# Patient Record
Sex: MALE | URBAN - METROPOLITAN AREA
[De-identification: names, ages, dates, MRNs, and addresses within clinical notes are randomized per-mention and may not be internally consistent; named-entity substitution may affect disease eponyms.]

---

## 2023-02-28 ENCOUNTER — APPOINTMENT (RX ONLY)
Dept: URBAN - METROPOLITAN AREA CLINIC 164 | Facility: CLINIC | Age: 63
Setting detail: DERMATOLOGY
End: 2023-02-28

## 2023-02-28 DIAGNOSIS — L72.8 OTHER FOLLICULAR CYSTS OF THE SKIN AND SUBCUTANEOUS TISSUE: ICD-10-CM

## 2023-02-28 DIAGNOSIS — R20.2 PARESTHESIA OF SKIN: ICD-10-CM

## 2023-02-28 DIAGNOSIS — D485 NEOPLASM OF UNCERTAIN BEHAVIOR OF SKIN: ICD-10-CM

## 2023-02-28 PROBLEM — D48.5 NEOPLASM OF UNCERTAIN BEHAVIOR OF SKIN: Status: ACTIVE | Noted: 2023-02-28

## 2023-02-28 PROCEDURE — ? RECOMMENDATIONS

## 2023-02-28 PROCEDURE — ? BIOPSY BY SHAVE METHOD

## 2023-02-28 PROCEDURE — ? CONSULTATION EXCISION

## 2023-02-28 PROCEDURE — 99203 OFFICE O/P NEW LOW 30 MIN: CPT | Mod: 25

## 2023-02-28 PROCEDURE — 11102 TANGNTL BX SKIN SINGLE LES: CPT

## 2023-02-28 PROCEDURE — ? COUNSELING

## 2023-02-28 ASSESSMENT — LOCATION DETAILED DESCRIPTION DERM
LOCATION DETAILED: RIGHT CENTRAL BUCCAL CHEEK
LOCATION DETAILED: RIGHT POSTERIOR SHOULDER
LOCATION DETAILED: LEFT MEDIAL TRAPEZIAL NECK

## 2023-02-28 ASSESSMENT — LOCATION ZONE DERM
LOCATION ZONE: NECK
LOCATION ZONE: ARM
LOCATION ZONE: FACE

## 2023-02-28 ASSESSMENT — LOCATION SIMPLE DESCRIPTION DERM
LOCATION SIMPLE: RIGHT CHEEK
LOCATION SIMPLE: RIGHT SHOULDER
LOCATION SIMPLE: POSTERIOR NECK

## 2023-02-28 NOTE — PROCEDURE: RECOMMENDATIONS
Recommendation Preamble: The following recommendations were made during the visit: CeraVe anti itch cream
Detail Level: Zone
Render Risk Assessment In Note?: no

## 2023-02-28 NOTE — PROCEDURE: CONSULTATION EXCISION
Detail Level: Detailed
Size Of Lesion: 1.5
X Size Of Lesion In Cm (Optional): 0
Patient/Caregiver provided printed discharge information.

## 2023-03-22 ENCOUNTER — APPOINTMENT (RX ONLY)
Dept: URBAN - METROPOLITAN AREA CLINIC 164 | Facility: CLINIC | Age: 63
Setting detail: DERMATOLOGY
End: 2023-03-22

## 2023-03-22 DIAGNOSIS — L72.8 OTHER FOLLICULAR CYSTS OF THE SKIN AND SUBCUTANEOUS TISSUE: ICD-10-CM

## 2023-03-22 PROCEDURE — 13131 CMPLX RPR F/C/C/M/N/AX/G/H/F: CPT

## 2023-03-22 PROCEDURE — ? PRESCRIPTION

## 2023-03-22 PROCEDURE — 11422 EXC H-F-NK-SP B9+MARG 1.1-2: CPT

## 2023-03-22 PROCEDURE — ? EXCISION

## 2023-03-22 RX ORDER — DOXYCYCLINE HYCLATE 100 MG/1
CAPSULE, GELATIN COATED ORAL BID
Qty: 14 | Refills: 0 | Status: ERX | COMMUNITY
Start: 2023-03-22

## 2023-03-22 RX ADMIN — DOXYCYCLINE HYCLATE: 100 CAPSULE, GELATIN COATED ORAL at 00:00

## 2023-03-22 ASSESSMENT — LOCATION SIMPLE DESCRIPTION DERM: LOCATION SIMPLE: POSTERIOR NECK

## 2023-03-22 ASSESSMENT — LOCATION DETAILED DESCRIPTION DERM: LOCATION DETAILED: LEFT MEDIAL TRAPEZIAL NECK

## 2023-03-22 ASSESSMENT — LOCATION ZONE DERM: LOCATION ZONE: NECK

## 2023-03-22 NOTE — PROCEDURE: EXCISION
Medical Necessity Information: It is in your best interest to select a reason for this procedure from the list below. All of these items fulfill various CMS LCD requirements except lesion extends to a margin.
Include Z78.9 (Other Specified Conditions Influencing Health Status) As An Associated Diagnosis?: No
Medical Necessity Clause: This procedure was medically necessary because the lesion that was treated was:
Lab: 6
Lab Facility: 3
Surgeon Performing Repair (Optional): Dr. Gonzáles
Biopsy Photograph Reviewed: Yes
Size Of Lesion In Cm: 1.8
X Size Of Lesion In Cm (Optional): 0
Size Of Margin In Cm: 0.1
Anesthesia Volume In Cc: 9
Eye Clamp Note Details: An eye clamp was used during the procedure.
Excision Method: Elliptical
Saucerization Depth: dermis and superficial adipose tissue
Repair Type: Complex
Intermediate / Complex Repair - Final Wound Length In Cm: 2.5
Suturegard Retention Suture: 2-0 Nylon
Retention Suture Bite Size: 3 mm
Length To Time In Minutes Device Was In Place: 10
Number Of Hemigard Strips Per Side: 1
Width Of Defect Perpendicular To Closure In Cm (Required): 1.5
Undermining Type: Entire Wound
Debridement Text: The wound edges were debrided prior to proceeding with the closure to facilitate wound healing.
Helical Rim Text: The closure involved the helical rim.
Vermilion Border Text: The closure involved the vermilion border.
Nostril Rim Text: The closure involved the nostril rim.
Retention Suture Text: Retention sutures were placed to support the closure and prevent dehiscence.
Suture Removal: 10 days
Epidermal Closure Graft Donor Site (Optional): simple interrupted
Graft Donor Site Bandage (Optional-Leave Blank If You Don't Want In Note): Steri-strips and a pressure bandage were applied to the donor site.
Detail Level: Simple
Excision Depth: adipose tissue
Scalpel Size: 15 blade
Anesthesia Type: 1% lidocaine with epinephrine
Hemostasis: Electrocautery
Estimated Blood Loss (Cc): minimal
Anesthesia Type: 1% lidocaine with 1:400,000 epinephrine
Deep Sutures: 4-0 Vicryl
Dermal Closure: buried vertical mattress
Epidermal Sutures: 3-0 Prolene
Epidermal Closure: running
Wound Care: Petrolatum
Dressing: dry sterile dressing
Suturegard Intro: Intraoperative tissue expansion was performed, utilizing the SUTUREGARD device, in order to reduce wound tension.
Suturegard Body: The suture ends were repeatedly re-tightened and re-clamped to achieve the desired tissue expansion.
Hemigard Intro: Due to skin fragility and wound tension, it was decided to use HEMIGARD adhesive retention suture devices to permit a linear closure. The skin was cleaned and dried for a 6cm distance away from the wound. Excessive hair, if present, was removed to allow for adhesion.
Hemigard Postcare Instructions: The HEMIGARD strips are to remain completely dry for at least 5-7 days.
Positioning (Leave Blank If You Do Not Want): The patient was placed in a comfortable position exposing the surgical site.
Complex Repair Preamble Text (Leave Blank If You Do Not Want): Extensive wide undermining was performed.
Intermediate Repair Preamble Text (Leave Blank If You Do Not Want): Undermining was performed with blunt dissection.
Fusiform Excision Additional Text (Leave Blank If You Do Not Want): The margin was drawn around the clinically apparent lesion.  A fusiform shape was then drawn on the skin incorporating the lesion and margins.  Incisions were then made along these lines to the appropriate tissue plane and the lesion was extirpated.
Eliptical Excision Additional Text (Leave Blank If You Do Not Want): The margin was drawn around the clinically apparent lesion.  An elliptical shape was then drawn on the skin incorporating the lesion and margins.  Incisions were then made along these lines to the appropriate tissue plane and the lesion was extirpated.
Saucerization Excision Additional Text (Leave Blank If You Do Not Want): The margin was drawn around the clinically apparent lesion.  Incisions were then made along these lines, in a tangential fashion, to the appropriate tissue plane and the lesion was extirpated.
Slit Excision Additional Text (Leave Blank If You Do Not Want): A linear line was drawn on the skin overlying the lesion. An incision was made slowly until the lesion was visualized.  Once visualized, the lesion was removed with blunt dissection.
Excisional Biopsy Additional Text (Leave Blank If You Do Not Want): The margin was drawn around the clinically apparent lesion. An elliptical shape was then drawn on the skin incorporating the lesion and margins.  Incisions were then made along these lines to the appropriate tissue plane and the lesion was extirpated.
Perilesional Excision Additional Text (Leave Blank If You Do Not Want): The margin was drawn around the clinically apparent lesion. Incisions were then made along these lines to the appropriate tissue plane and the lesion was extirpated.
Repair Performed By Another Provider Text (Leave Blank If You Do Not Want): After the tissue was excised the defect was repaired by another provider.
No Repair - Repaired With Adjacent Surgical Defect Text (Leave Blank If You Do Not Want): After the excision the defect was repaired concurrently with another surgical defect which was in close approximation.
Adjacent Tissue Transfer Text: The defect edges were debeveled with a #15 scalpel blade.  Given the location of the defect and the proximity to free margins an adjacent tissue transfer was deemed most appropriate.  Using a sterile surgical marker, an appropriate flap was drawn incorporating the defect and placing the expected incisions within the relaxed skin tension lines where possible.    The area thus outlined was incised deep to adipose tissue with a #15 scalpel blade.  The skin margins were undermined to an appropriate distance in all directions utilizing iris scissors.
Advancement Flap (Single) Text: The defect edges were debeveled with a #15 scalpel blade.  Given the location of the defect and the proximity to free margins a single advancement flap was deemed most appropriate.  Using a sterile surgical marker, an appropriate advancement flap was drawn incorporating the defect and placing the expected incisions within the relaxed skin tension lines where possible.    The area thus outlined was incised deep to adipose tissue with a #15 scalpel blade.  The skin margins were undermined to an appropriate distance in all directions utilizing iris scissors.
Advancement Flap (Double) Text: The defect edges were debeveled with a #15 scalpel blade.  Given the location of the defect and the proximity to free margins a double advancement flap was deemed most appropriate.  Using a sterile surgical marker, the appropriate advancement flaps were drawn incorporating the defect and placing the expected incisions within the relaxed skin tension lines where possible.    The area thus outlined was incised deep to adipose tissue with a #15 scalpel blade.  The skin margins were undermined to an appropriate distance in all directions utilizing iris scissors.
Burow's Advancement Flap Text: The defect edges were debeveled with a #15 scalpel blade.  Given the location of the defect and the proximity to free margins a Burow's advancement flap was deemed most appropriate.  Using a sterile surgical marker, the appropriate advancement flap was drawn incorporating the defect and placing the expected incisions within the relaxed skin tension lines where possible.    The area thus outlined was incised deep to adipose tissue with a #15 scalpel blade.  The skin margins were undermined to an appropriate distance in all directions utilizing iris scissors.
Chonodrocutaneous Helical Advancement Flap Text: The defect edges were debeveled with a #15 scalpel blade.  Given the location of the defect and the proximity to free margins a chondrocutaneous helical advancement flap was deemed most appropriate.  Using a sterile surgical marker, the appropriate advancement flap was drawn incorporating the defect and placing the expected incisions within the relaxed skin tension lines where possible.    The area thus outlined was incised deep to adipose tissue with a #15 scalpel blade.  The skin margins were undermined to an appropriate distance in all directions utilizing iris scissors.
Crescentic Advancement Flap Text: The defect edges were debeveled with a #15 scalpel blade.  Given the location of the defect and the proximity to free margins a crescentic advancement flap was deemed most appropriate.  Using a sterile surgical marker, the appropriate advancement flap was drawn incorporating the defect and placing the expected incisions within the relaxed skin tension lines where possible.    The area thus outlined was incised deep to adipose tissue with a #15 scalpel blade.  The skin margins were undermined to an appropriate distance in all directions utilizing iris scissors.
A-T Advancement Flap Text: The defect edges were debeveled with a #15 scalpel blade.  Given the location of the defect, shape of the defect and the proximity to free margins an A-T advancement flap was deemed most appropriate.  Using a sterile surgical marker, an appropriate advancement flap was drawn incorporating the defect and placing the expected incisions within the relaxed skin tension lines where possible.    The area thus outlined was incised deep to adipose tissue with a #15 scalpel blade.  The skin margins were undermined to an appropriate distance in all directions utilizing iris scissors.
O-T Advancement Flap Text: The defect edges were debeveled with a #15 scalpel blade.  Given the location of the defect, shape of the defect and the proximity to free margins an O-T advancement flap was deemed most appropriate.  Using a sterile surgical marker, an appropriate advancement flap was drawn incorporating the defect and placing the expected incisions within the relaxed skin tension lines where possible.    The area thus outlined was incised deep to adipose tissue with a #15 scalpel blade.  The skin margins were undermined to an appropriate distance in all directions utilizing iris scissors.
O-L Flap Text: The defect edges were debeveled with a #15 scalpel blade.  Given the location of the defect, shape of the defect and the proximity to free margins an O-L flap was deemed most appropriate.  Using a sterile surgical marker, an appropriate advancement flap was drawn incorporating the defect and placing the expected incisions within the relaxed skin tension lines where possible.    The area thus outlined was incised deep to adipose tissue with a #15 scalpel blade.  The skin margins were undermined to an appropriate distance in all directions utilizing iris scissors.
O-Z Flap Text: The defect edges were debeveled with a #15 scalpel blade.  Given the location of the defect, shape of the defect and the proximity to free margins an O-Z flap was deemed most appropriate.  Using a sterile surgical marker, an appropriate transposition flap was drawn incorporating the defect and placing the expected incisions within the relaxed skin tension lines where possible. The area thus outlined was incised deep to adipose tissue with a #15 scalpel blade.  The skin margins were undermined to an appropriate distance in all directions utilizing iris scissors.
Double O-Z Flap Text: The defect edges were debeveled with a #15 scalpel blade.  Given the location of the defect, shape of the defect and the proximity to free margins a Double O-Z flap was deemed most appropriate.  Using a sterile surgical marker, an appropriate transposition flap was drawn incorporating the defect and placing the expected incisions within the relaxed skin tension lines where possible. The area thus outlined was incised deep to adipose tissue with a #15 scalpel blade.  The skin margins were undermined to an appropriate distance in all directions utilizing iris scissors.
V-Y Flap Text: The defect edges were debeveled with a #15 scalpel blade.  Given the location of the defect, shape of the defect and the proximity to free margins a V-Y flap was deemed most appropriate.  Using a sterile surgical marker, an appropriate advancement flap was drawn incorporating the defect and placing the expected incisions within the relaxed skin tension lines where possible.    The area thus outlined was incised deep to adipose tissue with a #15 scalpel blade.  The skin margins were undermined to an appropriate distance in all directions utilizing iris scissors.
Advancement-Rotation Flap Text: The defect edges were debeveled with a #15 scalpel blade.  Given the location of the defect, shape of the defect and the proximity to free margins an advancement-rotation flap was deemed most appropriate.  Using a sterile surgical marker, an appropriate flap was drawn incorporating the defect and placing the expected incisions within the relaxed skin tension lines where possible. The area thus outlined was incised deep to adipose tissue with a #15 scalpel blade.  The skin margins were undermined to an appropriate distance in all directions utilizing iris scissors.
Mercedes Flap Text: The defect edges were debeveled with a #15 scalpel blade.  Given the location of the defect, shape of the defect and the proximity to free margins a Mercedes flap was deemed most appropriate.  Using a sterile surgical marker, an appropriate advancement flap was drawn incorporating the defect and placing the expected incisions within the relaxed skin tension lines where possible. The area thus outlined was incised deep to adipose tissue with a #15 scalpel blade.  The skin margins were undermined to an appropriate distance in all directions utilizing iris scissors.
Modified Advancement Flap Text: The defect edges were debeveled with a #15 scalpel blade.  Given the location of the defect, shape of the defect and the proximity to free margins a modified advancement flap was deemed most appropriate.  Using a sterile surgical marker, an appropriate advancement flap was drawn incorporating the defect and placing the expected incisions within the relaxed skin tension lines where possible.    The area thus outlined was incised deep to adipose tissue with a #15 scalpel blade.  The skin margins were undermined to an appropriate distance in all directions utilizing iris scissors.
Mucosal Advancement Flap Text: Given the location of the defect, shape of the defect and the proximity to free margins a mucosal advancement flap was deemed most appropriate. Incisions were made with a 15 blade scalpel in the appropriate fashion along the cutaneous vermillion border and the mucosal lip. The remaining actinically damaged mucosal tissue was excised.  The mucosal advancement flap was then elevated to the gingival sulcus with care taken to preserve the neurovascular structures and advanced into the primary defect. Care was taken to ensure that precise realignment of the vermilion border was achieved.
Peng Advancement Flap Text: The defect edges were debeveled with a #15 scalpel blade.  Given the location of the defect, shape of the defect and the proximity to free margins a Peng advancement flap was deemed most appropriate.  Using a sterile surgical marker, an appropriate advancement flap was drawn incorporating the defect and placing the expected incisions within the relaxed skin tension lines where possible. The area thus outlined was incised deep to adipose tissue with a #15 scalpel blade.  The skin margins were undermined to an appropriate distance in all directions utilizing iris scissors.
Hatchet Flap Text: The defect edges were debeveled with a #15 scalpel blade.  Given the location of the defect, shape of the defect and the proximity to free margins a hatchet flap was deemed most appropriate.  Using a sterile surgical marker, an appropriate hatchet flap was drawn incorporating the defect and placing the expected incisions within the relaxed skin tension lines where possible.    The area thus outlined was incised deep to adipose tissue with a #15 scalpel blade.  The skin margins were undermined to an appropriate distance in all directions utilizing iris scissors.
Rotation Flap Text: The defect edges were debeveled with a #15 scalpel blade.  Given the location of the defect, shape of the defect and the proximity to free margins a rotation flap was deemed most appropriate.  Using a sterile surgical marker, an appropriate rotation flap was drawn incorporating the defect and placing the expected incisions within the relaxed skin tension lines where possible.    The area thus outlined was incised deep to adipose tissue with a #15 scalpel blade.  The skin margins were undermined to an appropriate distance in all directions utilizing iris scissors.
Spiral Flap Text: The defect edges were debeveled with a #15 scalpel blade.  Given the location of the defect, shape of the defect and the proximity to free margins a spiral flap was deemed most appropriate.  Using a sterile surgical marker, an appropriate rotation flap was drawn incorporating the defect and placing the expected incisions within the relaxed skin tension lines where possible. The area thus outlined was incised deep to adipose tissue with a #15 scalpel blade.  The skin margins were undermined to an appropriate distance in all directions utilizing iris scissors.
Staged Advancement Flap Text: The defect edges were debeveled with a #15 scalpel blade.  Given the location of the defect, shape of the defect and the proximity to free margins a staged advancement flap was deemed most appropriate.  Using a sterile surgical marker, an appropriate advancement flap was drawn incorporating the defect and placing the expected incisions within the relaxed skin tension lines where possible. The area thus outlined was incised deep to adipose tissue with a #15 scalpel blade.  The skin margins were undermined to an appropriate distance in all directions utilizing iris scissors.
Star Wedge Flap Text: The defect edges were debeveled with a #15 scalpel blade.  Given the location of the defect, shape of the defect and the proximity to free margins a star wedge flap was deemed most appropriate.  Using a sterile surgical marker, an appropriate rotation flap was drawn incorporating the defect and placing the expected incisions within the relaxed skin tension lines where possible. The area thus outlined was incised deep to adipose tissue with a #15 scalpel blade.  The skin margins were undermined to an appropriate distance in all directions utilizing iris scissors.
Transposition Flap Text: The defect edges were debeveled with a #15 scalpel blade.  Given the location of the defect and the proximity to free margins a transposition flap was deemed most appropriate.  Using a sterile surgical marker, an appropriate transposition flap was drawn incorporating the defect.    The area thus outlined was incised deep to adipose tissue with a #15 scalpel blade.  The skin margins were undermined to an appropriate distance in all directions utilizing iris scissors.
Muscle Hinge Flap Text: The defect edges were debeveled with a #15 scalpel blade.  Given the size, depth and location of the defect and the proximity to free margins a muscle hinge flap was deemed most appropriate.  Using a sterile surgical marker, an appropriate hinge flap was drawn incorporating the defect. The area thus outlined was incised with a #15 scalpel blade.  The skin margins were undermined to an appropriate distance in all directions utilizing iris scissors.
Mustarde Flap Text: The defect edges were debeveled with a #15 scalpel blade.  Given the size, depth and location of the defect and the proximity to free margins a Mustarde flap was deemed most appropriate.  Using a sterile surgical marker, an appropriate flap was drawn incorporating the defect. The area thus outlined was incised with a #15 scalpel blade.  The skin margins were undermined to an appropriate distance in all directions utilizing iris scissors.
Nasal Turnover Hinge Flap Text: The defect edges were debeveled with a #15 scalpel blade.  Given the size, depth, location of the defect and the defect being full thickness a nasal turnover hinge flap was deemed most appropriate.  Using a sterile surgical marker, an appropriate hinge flap was drawn incorporating the defect. The area thus outlined was incised with a #15 scalpel blade. The flap was designed to recreate the nasal mucosal lining and the alar rim. The skin margins were undermined to an appropriate distance in all directions utilizing iris scissors.
Nasalis-Muscle-Based Myocutaneous Island Pedicle Flap Text: Using a #15 blade, an incision was made around the donor flap to the level of the nasalis muscle. Wide lateral undermining was then performed in both the subcutaneous plane above the nasalis muscle, and in a submuscular plane just above periosteum. This allowed the formation of a free nasalis muscle axial pedicle (based on the angular artery) which was still attached to the actual cutaneous flap, increasing its mobility and vascular viability. Hemostasis was obtained with pinpoint electrocoagulation. The flap was mobilized into position and the pivotal anchor points positioned and stabilized with buried interrupted sutures. Subcutaneous and dermal tissues were closed in a multilayered fashion with sutures. Tissue redundancies were excised, and the epidermal edges were apposed without significant tension and sutured with sutures.
Orbicularis Oris Muscle Flap Text: The defect edges were debeveled with a #15 scalpel blade.  Given that the defect affected the competency of the oral sphincter an orbicularis oris muscle flap was deemed most appropriate to restore this competency and normal muscle function.  Using a sterile surgical marker, an appropriate flap was drawn incorporating the defect. The area thus outlined was incised with a #15 scalpel blade.
Melolabial Transposition Flap Text: The defect edges were debeveled with a #15 scalpel blade.  Given the location of the defect and the proximity to free margins a melolabial flap was deemed most appropriate.  Using a sterile surgical marker, an appropriate melolabial transposition flap was drawn incorporating the defect.    The area thus outlined was incised deep to adipose tissue with a #15 scalpel blade.  The skin margins were undermined to an appropriate distance in all directions utilizing iris scissors.
Rhombic Flap Text: The defect edges were debeveled with a #15 scalpel blade.  Given the location of the defect and the proximity to free margins a rhombic flap was deemed most appropriate.  Using a sterile surgical marker, an appropriate rhombic flap was drawn incorporating the defect.    The area thus outlined was incised deep to adipose tissue with a #15 scalpel blade.  The skin margins were undermined to an appropriate distance in all directions utilizing iris scissors.
Rhomboid Transposition Flap Text: The defect edges were debeveled with a #15 scalpel blade.  Given the location of the defect and the proximity to free margins a rhomboid transposition flap was deemed most appropriate.  Using a sterile surgical marker, an appropriate rhomboid flap was drawn incorporating the defect.    The area thus outlined was incised deep to adipose tissue with a #15 scalpel blade.  The skin margins were undermined to an appropriate distance in all directions utilizing iris scissors.
Bi-Rhombic Flap Text: The defect edges were debeveled with a #15 scalpel blade.  Given the location of the defect and the proximity to free margins a bi-rhombic flap was deemed most appropriate.  Using a sterile surgical marker, an appropriate rhombic flap was drawn incorporating the defect. The area thus outlined was incised deep to adipose tissue with a #15 scalpel blade.  The skin margins were undermined to an appropriate distance in all directions utilizing iris scissors.
Helical Rim Advancement Flap Text: The defect edges were debeveled with a #15 blade scalpel.  Given the location of the defect and the proximity to free margins (helical rim) a double helical rim advancement flap was deemed most appropriate.  Using a sterile surgical marker, the appropriate advancement flaps were drawn incorporating the defect and placing the expected incisions between the helical rim and antihelix where possible.  The area thus outlined was incised through and through with a #15 scalpel blade.  With a skin hook and iris scissors, the flaps were gently and sharply undermined and freed up.
Bilateral Helical Rim Advancement Flap Text: The defect edges were debeveled with a #15 blade scalpel.  Given the location of the defect and the proximity to free margins (helical rim) a bilateral helical rim advancement flap was deemed most appropriate.  Using a sterile surgical marker, the appropriate advancement flaps were drawn incorporating the defect and placing the expected incisions between the helical rim and antihelix where possible.  The area thus outlined was incised through and through with a #15 scalpel blade.  With a skin hook and iris scissors, the flaps were gently and sharply undermined and freed up.
Ear Star Wedge Flap Text: The defect edges were debeveled with a #15 blade scalpel.  Given the location of the defect and the proximity to free margins (helical rim) an ear star wedge flap was deemed most appropriate.  Using a sterile surgical marker, the appropriate flap was drawn incorporating the defect and placing the expected incisions between the helical rim and antihelix where possible.  The area thus outlined was incised through and through with a #15 scalpel blade.
Banner Transposition Flap Text: The defect edges were debeveled with a #15 scalpel blade.  Given the location of the defect and the proximity to free margins a Banner transposition flap was deemed most appropriate.  Using a sterile surgical marker, an appropriate flap drawn around the defect. The area thus outlined was incised deep to adipose tissue with a #15 scalpel blade.  The skin margins were undermined to an appropriate distance in all directions utilizing iris scissors.
Bilobed Flap Text: The defect edges were debeveled with a #15 scalpel blade.  Given the location of the defect and the proximity to free margins a bilobe flap was deemed most appropriate.  Using a sterile surgical marker, an appropriate bilobe flap drawn around the defect.    The area thus outlined was incised deep to adipose tissue with a #15 scalpel blade.  The skin margins were undermined to an appropriate distance in all directions utilizing iris scissors.
Bilobed Transposition Flap Text: The defect edges were debeveled with a #15 scalpel blade.  Given the location of the defect and the proximity to free margins a bilobed transposition flap was deemed most appropriate.  Using a sterile surgical marker, an appropriate bilobe flap drawn around the defect.    The area thus outlined was incised deep to adipose tissue with a #15 scalpel blade.  The skin margins were undermined to an appropriate distance in all directions utilizing iris scissors.
Trilobed Flap Text: The defect edges were debeveled with a #15 scalpel blade.  Given the location of the defect and the proximity to free margins a trilobed flap was deemed most appropriate.  Using a sterile surgical marker, an appropriate trilobed flap drawn around the defect.    The area thus outlined was incised deep to adipose tissue with a #15 scalpel blade.  The skin margins were undermined to an appropriate distance in all directions utilizing iris scissors.
Dorsal Nasal Flap Text: The defect edges were debeveled with a #15 scalpel blade.  Given the location of the defect and the proximity to free margins a dorsal nasal flap was deemed most appropriate.  Using a sterile surgical marker, an appropriate dorsal nasal flap was drawn around the defect.    The area thus outlined was incised deep to adipose tissue with a #15 scalpel blade.  The skin margins were undermined to an appropriate distance in all directions utilizing iris scissors.
Island Pedicle Flap Text: The defect edges were debeveled with a #15 scalpel blade.  Given the location of the defect, shape of the defect and the proximity to free margins an island pedicle advancement flap was deemed most appropriate.  Using a sterile surgical marker, an appropriate advancement flap was drawn incorporating the defect, outlining the appropriate donor tissue and placing the expected incisions within the relaxed skin tension lines where possible.    The area thus outlined was incised deep to adipose tissue with a #15 scalpel blade.  The skin margins were undermined to an appropriate distance in all directions around the primary defect and laterally outward around the island pedicle utilizing iris scissors.  There was minimal undermining beneath the pedicle flap.
Island Pedicle Flap With Canthal Suspension Text: The defect edges were debeveled with a #15 scalpel blade.  Given the location of the defect, shape of the defect and the proximity to free margins an island pedicle advancement flap was deemed most appropriate.  Using a sterile surgical marker, an appropriate advancement flap was drawn incorporating the defect, outlining the appropriate donor tissue and placing the expected incisions within the relaxed skin tension lines where possible. The area thus outlined was incised deep to adipose tissue with a #15 scalpel blade.  The skin margins were undermined to an appropriate distance in all directions around the primary defect and laterally outward around the island pedicle utilizing iris scissors.  There was minimal undermining beneath the pedicle flap. A suspension suture was placed in the canthal tendon to prevent tension and prevent ectropion.
Alar Island Pedicle Flap Text: The defect edges were debeveled with a #15 scalpel blade.  Given the location of the defect, shape of the defect and the proximity to the alar rim an island pedicle advancement flap was deemed most appropriate.  Using a sterile surgical marker, an appropriate advancement flap was drawn incorporating the defect, outlining the appropriate donor tissue and placing the expected incisions within the nasal ala running parallel to the alar rim. The area thus outlined was incised with a #15 scalpel blade.  The skin margins were undermined minimally to an appropriate distance in all directions around the primary defect and laterally outward around the island pedicle utilizing iris scissors.  There was minimal undermining beneath the pedicle flap.
Double Island Pedicle Flap Text: The defect edges were debeveled with a #15 scalpel blade.  Given the location of the defect, shape of the defect and the proximity to free margins a double island pedicle advancement flap was deemed most appropriate.  Using a sterile surgical marker, an appropriate advancement flap was drawn incorporating the defect, outlining the appropriate donor tissue and placing the expected incisions within the relaxed skin tension lines where possible.    The area thus outlined was incised deep to adipose tissue with a #15 scalpel blade.  The skin margins were undermined to an appropriate distance in all directions around the primary defect and laterally outward around the island pedicle utilizing iris scissors.  There was minimal undermining beneath the pedicle flap.
Island Pedicle Flap-Requiring Vessel Identification Text: The defect edges were debeveled with a #15 scalpel blade.  Given the location of the defect, shape of the defect and the proximity to free margins an island pedicle advancement flap was deemed most appropriate.  Using a sterile surgical marker, an appropriate advancement flap was drawn, based on the axial vessel mentioned above, incorporating the defect, outlining the appropriate donor tissue and placing the expected incisions within the relaxed skin tension lines where possible.    The area thus outlined was incised deep to adipose tissue with a #15 scalpel blade.  The skin margins were undermined to an appropriate distance in all directions around the primary defect and laterally outward around the island pedicle utilizing iris scissors.  There was minimal undermining beneath the pedicle flap.
Keystone Flap Text: The defect edges were debeveled with a #15 scalpel blade.  Given the location of the defect, shape of the defect a keystone flap was deemed most appropriate.  Using a sterile surgical marker, an appropriate keystone flap was drawn incorporating the defect, outlining the appropriate donor tissue and placing the expected incisions within the relaxed skin tension lines where possible. The area thus outlined was incised deep to adipose tissue with a #15 scalpel blade.  The skin margins were undermined to an appropriate distance in all directions around the primary defect and laterally outward around the flap utilizing iris scissors.
O-T Plasty Text: The defect edges were debeveled with a #15 scalpel blade.  Given the location of the defect, shape of the defect and the proximity to free margins an O-T plasty was deemed most appropriate.  Using a sterile surgical marker, an appropriate O-T plasty was drawn incorporating the defect and placing the expected incisions within the relaxed skin tension lines where possible.    The area thus outlined was incised deep to adipose tissue with a #15 scalpel blade.  The skin margins were undermined to an appropriate distance in all directions utilizing iris scissors.
O-Z Plasty Text: The defect edges were debeveled with a #15 scalpel blade.  Given the location of the defect, shape of the defect and the proximity to free margins an O-Z plasty (double transposition flap) was deemed most appropriate.  Using a sterile surgical marker, the appropriate transposition flaps were drawn incorporating the defect and placing the expected incisions within the relaxed skin tension lines where possible.    The area thus outlined was incised deep to adipose tissue with a #15 scalpel blade.  The skin margins were undermined to an appropriate distance in all directions utilizing iris scissors.  Hemostasis was achieved with electrocautery.  The flaps were then transposed into place, one clockwise and the other counterclockwise, and anchored with interrupted buried subcutaneous sutures.
Double O-Z Plasty Text: The defect edges were debeveled with a #15 scalpel blade.  Given the location of the defect, shape of the defect and the proximity to free margins a Double O-Z plasty (double transposition flap) was deemed most appropriate.  Using a sterile surgical marker, the appropriate transposition flaps were drawn incorporating the defect and placing the expected incisions within the relaxed skin tension lines where possible. The area thus outlined was incised deep to adipose tissue with a #15 scalpel blade.  The skin margins were undermined to an appropriate distance in all directions utilizing iris scissors.  Hemostasis was achieved with electrocautery.  The flaps were then transposed into place, one clockwise and the other counterclockwise, and anchored with interrupted buried subcutaneous sutures.
V-Y Plasty Text: The defect edges were debeveled with a #15 scalpel blade.  Given the location of the defect, shape of the defect and the proximity to free margins an V-Y advancement flap was deemed most appropriate.  Using a sterile surgical marker, an appropriate advancement flap was drawn incorporating the defect and placing the expected incisions within the relaxed skin tension lines where possible.    The area thus outlined was incised deep to adipose tissue with a #15 scalpel blade.  The skin margins were undermined to an appropriate distance in all directions utilizing iris scissors.
H Plasty Text: Given the location of the defect, shape of the defect and the proximity to free margins a H-plasty was deemed most appropriate for repair.  Using a sterile surgical marker, the appropriate advancement arms of the H-plasty were drawn incorporating the defect and placing the expected incisions within the relaxed skin tension lines where possible. The area thus outlined was incised deep to adipose tissue with a #15 scalpel blade. The skin margins were undermined to an appropriate distance in all directions utilizing iris scissors.  The opposing advancement arms were then advanced into place in opposite direction and anchored with interrupted buried subcutaneous sutures.
W Plasty Text: The lesion was extirpated to the level of the fat with a #15 scalpel blade.  Given the location of the defect, shape of the defect and the proximity to free margins a W-plasty was deemed most appropriate for repair.  Using a sterile surgical marker, the appropriate transposition arms of the W-plasty were drawn incorporating the defect and placing the expected incisions within the relaxed skin tension lines where possible.    The area thus outlined was incised deep to adipose tissue with a #15 scalpel blade.  The skin margins were undermined to an appropriate distance in all directions utilizing iris scissors.  The opposing transposition arms were then transposed into place in opposite direction and anchored with interrupted buried subcutaneous sutures.
Z Plasty Text: The lesion was extirpated to the level of the fat with a #15 scalpel blade.  Given the location of the defect, shape of the defect and the proximity to free margins a Z-plasty was deemed most appropriate for repair.  Using a sterile surgical marker, the appropriate transposition arms of the Z-plasty were drawn incorporating the defect and placing the expected incisions within the relaxed skin tension lines where possible.    The area thus outlined was incised deep to adipose tissue with a #15 scalpel blade.  The skin margins were undermined to an appropriate distance in all directions utilizing iris scissors.  The opposing transposition arms were then transposed into place in opposite direction and anchored with interrupted buried subcutaneous sutures.
Zygomaticofacial Flap Text: Given the location of the defect, shape of the defect and the proximity to free margins a zygomaticofacial flap was deemed most appropriate for repair.  Using a sterile surgical marker, the appropriate flap was drawn incorporating the defect and placing the expected incisions within the relaxed skin tension lines where possible. The area thus outlined was incised deep to adipose tissue with a #15 scalpel blade with preservation of a vascular pedicle.  The skin margins were undermined to an appropriate distance in all directions utilizing iris scissors.  The flap was then placed into the defect and anchored with interrupted buried subcutaneous sutures.
Cheek Interpolation Flap Text: A decision was made to reconstruct the defect utilizing an interpolation axial flap and a staged reconstruction.  A telfa template was made of the defect.  This telfa template was then used to outline the Cheek Interpolation flap.  The donor area for the pedicle flap was then injected with anesthesia.  The flap was excised through the skin and subcutaneous tissue down to the layer of the underlying musculature.  The interpolation flap was carefully excised within this deep plane to maintain its blood supply.  The edges of the donor site were undermined.   The donor site was closed in a primary fashion.  The pedicle was then rotated into position and sutured.  Once the tube was sutured into place, adequate blood supply was confirmed with blanching and refill.  The pedicle was then wrapped with xeroform gauze and dressed appropriately with a telfa and gauze bandage to ensure continued blood supply and protect the attached pedicle.
Cheek-To-Nose Interpolation Flap Text: A decision was made to reconstruct the defect utilizing an interpolation axial flap and a staged reconstruction.  A telfa template was made of the defect.  This telfa template was then used to outline the Cheek-To-Nose Interpolation flap.  The donor area for the pedicle flap was then injected with anesthesia.  The flap was excised through the skin and subcutaneous tissue down to the layer of the underlying musculature.  The interpolation flap was carefully excised within this deep plane to maintain its blood supply.  The edges of the donor site were undermined.   The donor site was closed in a primary fashion.  The pedicle was then rotated into position and sutured.  Once the tube was sutured into place, adequate blood supply was confirmed with blanching and refill.  The pedicle was then wrapped with xeroform gauze and dressed appropriately with a telfa and gauze bandage to ensure continued blood supply and protect the attached pedicle.
Interpolation Flap Text: A decision was made to reconstruct the defect utilizing an interpolation axial flap and a staged reconstruction.  A telfa template was made of the defect.  This telfa template was then used to outline the interpolation flap.  The donor area for the pedicle flap was then injected with anesthesia.  The flap was excised through the skin and subcutaneous tissue down to the layer of the underlying musculature.  The interpolation flap was carefully excised within this deep plane to maintain its blood supply.  The edges of the donor site were undermined.   The donor site was closed in a primary fashion.  The pedicle was then rotated into position and sutured.  Once the tube was sutured into place, adequate blood supply was confirmed with blanching and refill.  The pedicle was then wrapped with xeroform gauze and dressed appropriately with a telfa and gauze bandage to ensure continued blood supply and protect the attached pedicle.
Melolabial Interpolation Flap Text: A decision was made to reconstruct the defect utilizing an interpolation axial flap and a staged reconstruction.  A telfa template was made of the defect.  This telfa template was then used to outline the melolabial interpolation flap.  The donor area for the pedicle flap was then injected with anesthesia.  The flap was excised through the skin and subcutaneous tissue down to the layer of the underlying musculature.  The pedicle flap was carefully excised within this deep plane to maintain its blood supply.  The edges of the donor site were undermined.   The donor site was closed in a primary fashion.  The pedicle was then rotated into position and sutured.  Once the tube was sutured into place, adequate blood supply was confirmed with blanching and refill.  The pedicle was then wrapped with xeroform gauze and dressed appropriately with a telfa and gauze bandage to ensure continued blood supply and protect the attached pedicle.
Mastoid Interpolation Flap Text: A decision was made to reconstruct the defect utilizing an interpolation axial flap and a staged reconstruction.  A telfa template was made of the defect.  This telfa template was then used to outline the mastoid interpolation flap.  The donor area for the pedicle flap was then injected with anesthesia.  The flap was excised through the skin and subcutaneous tissue down to the layer of the underlying musculature.  The pedicle flap was carefully excised within this deep plane to maintain its blood supply.  The edges of the donor site were undermined.   The donor site was closed in a primary fashion.  The pedicle was then rotated into position and sutured.  Once the tube was sutured into place, adequate blood supply was confirmed with blanching and refill.  The pedicle was then wrapped with xeroform gauze and dressed appropriately with a telfa and gauze bandage to ensure continued blood supply and protect the attached pedicle.
Posterior Auricular Interpolation Flap Text: A decision was made to reconstruct the defect utilizing an interpolation axial flap and a staged reconstruction.  A telfa template was made of the defect.  This telfa template was then used to outline the posterior auricular interpolation flap.  The donor area for the pedicle flap was then injected with anesthesia.  The flap was excised through the skin and subcutaneous tissue down to the layer of the underlying musculature.  The pedicle flap was carefully excised within this deep plane to maintain its blood supply.  The edges of the donor site were undermined.   The donor site was closed in a primary fashion.  The pedicle was then rotated into position and sutured.  Once the tube was sutured into place, adequate blood supply was confirmed with blanching and refill.  The pedicle was then wrapped with xeroform gauze and dressed appropriately with a telfa and gauze bandage to ensure continued blood supply and protect the attached pedicle.
Paramedian Forehead Flap Text: A decision was made to reconstruct the defect utilizing an interpolation axial flap and a staged reconstruction.  A telfa template was made of the defect.  This telfa template was then used to outline the paramedian forehead pedicle flap.  The donor area for the pedicle flap was then injected with anesthesia.  The flap was excised through the skin and subcutaneous tissue down to the layer of the underlying musculature.  The pedicle flap was carefully excised within this deep plane to maintain its blood supply.  The edges of the donor site were undermined.   The donor site was closed in a primary fashion.  The pedicle was then rotated into position and sutured.  Once the tube was sutured into place, adequate blood supply was confirmed with blanching and refill.  The pedicle was then wrapped with xeroform gauze and dressed appropriately with a telfa and gauze bandage to ensure continued blood supply and protect the attached pedicle.
Abbe Flap (Upper To Lower Lip) Text: The defect of the lower lip was assessed and measured.  Given the location and size of the defect, an Abbe flap was deemed most appropriate. Using a sterile surgical marker, an appropriate Abbe flap was measured and drawn on the upper lip. Local anesthesia was then infiltrated.  A scalpel was then used to incise the upper lip through and through the skin, vermilion, muscle and mucosa, leaving the flap pedicled on the opposite side.  The flap was then rotated and transferred to the lower lip defect.  The flap was then sutured into place with a three layer technique, closing the orbicularis oris muscle layer with subcutaneous buried sutures, followed by a mucosal layer and an epidermal layer.
Abbe Flap (Lower To Upper Lip) Text: The defect of the upper lip was assessed and measured.  Given the location and size of the defect, an Abbe flap was deemed most appropriate. Using a sterile surgical marker, an appropriate Abbe flap was measured and drawn on the lower lip. Local anesthesia was then infiltrated. A scalpel was then used to incise the upper lip through and through the skin, vermilion, muscle and mucosa, leaving the flap pedicled on the opposite side.  The flap was then rotated and transferred to the lower lip defect.  The flap was then sutured into place with a three layer technique, closing the orbicularis oris muscle layer with subcutaneous buried sutures, followed by a mucosal layer and an epidermal layer.
Estlander Flap (Upper To Lower Lip) Text: The defect of the lower lip was assessed and measured.  Given the location and size of the defect, an Estlander flap was deemed most appropriate. Using a sterile surgical marker, an appropriate Estlander flap was measured and drawn on the upper lip. Local anesthesia was then infiltrated. A scalpel was then used to incise the lateral aspect of the flap, through skin, muscle and mucosa, leaving the flap pedicled medially.  The flap was then rotated and positioned to fill the lower lip defect.  The flap was then sutured into place with a three layer technique, closing the orbicularis oris muscle layer with subcutaneous buried sutures, followed by a mucosal layer and an epidermal layer.
Lip Wedge Excision Repair Text: Given the location of the defect and the proximity to free margins a full thickness wedge repair was deemed most appropriate.  Using a sterile surgical marker, the appropriate repair was drawn incorporating the defect and placing the expected incisions perpendicular to the vermilion border.  The vermilion border was also meticulously outlined to ensure appropriate reapproximation during the repair.  The area thus outlined was incised through and through with a #15 scalpel blade.  The muscularis and dermis were reaproximated with deep sutures following hemostasis. Care was taken to realign the vermilion border before proceeding with the superficial closure.  Once the vermilion was realigned the superfical and mucosal closure was finished.
Ftsg Text: The defect edges were debeveled with a #15 scalpel blade.  Given the location of the defect, shape of the defect and the proximity to free margins a full thickness skin graft was deemed most appropriate.  Using a sterile surgical marker, the primary defect shape was transferred to the donor site. The area thus outlined was incised deep to adipose tissue with a #15 scalpel blade.  The harvested graft was then trimmed of adipose tissue until only dermis and epidermis was left.  The skin margins of the secondary defect were undermined to an appropriate distance in all directions utilizing iris scissors.  The secondary defect was closed with interrupted buried subcutaneous sutures.  The skin edges were then re-apposed with running  sutures.  The skin graft was then placed in the primary defect and oriented appropriately.
Split-Thickness Skin Graft Text: The defect edges were debeveled with a #15 scalpel blade.  Given the location of the defect, shape of the defect and the proximity to free margins a split thickness skin graft was deemed most appropriate.  Using a sterile surgical marker, the primary defect shape was transferred to the donor site. The split thickness graft was then harvested.  The skin graft was then placed in the primary defect and oriented appropriately.
Burow's Graft Text: The defect edges were debeveled with a #15 scalpel blade.  Given the location of the defect, shape of the defect, the proximity to free margins and the presence of a standing cone deformity a Burow's skin graft was deemed most appropriate. The standing cone was removed and this tissue was then trimmed to the shape of the primary defect. The adipose tissue was also removed until only dermis and epidermis were left.  The skin margins of the secondary defect were undermined to an appropriate distance in all directions utilizing iris scissors.  The secondary defect was closed with interrupted buried subcutaneous sutures.  The skin edges were then re-apposed with running  sutures.  The skin graft was then placed in the primary defect and oriented appropriately.
Cartilage Graft Text: The defect edges were debeveled with a #15 scalpel blade.  Given the location of the defect, shape of the defect, the fact the defect involved a full thickness cartilage defect a cartilage graft was deemed most appropriate.  An appropriate donor site was identified, cleansed, and anesthetized. The cartilage graft was then harvested and transferred to the recipient site, oriented appropriately and then sutured into place.  The secondary defect was then repaired using a primary closure.
Composite Graft Text: The defect edges were debeveled with a #15 scalpel blade.  Given the location of the defect, shape of the defect, the proximity to free margins and the fact the defect was full thickness a composite graft was deemed most appropriate.  The defect was outline and then transferred to the donor site.  A full thickness graft was then excised from the donor site. The graft was then placed in the primary defect, oriented appropriately and then sutured into place.  The secondary defect was then repaired using a primary closure.
Epidermal Autograft Text: The defect edges were debeveled with a #15 scalpel blade.  Given the location of the defect, shape of the defect and the proximity to free margins an epidermal autograft was deemed most appropriate.  Using a sterile surgical marker, the primary defect shape was transferred to the donor site. The epidermal graft was then harvested.  The skin graft was then placed in the primary defect and oriented appropriately.
Dermal Autograft Text: The defect edges were debeveled with a #15 scalpel blade.  Given the location of the defect, shape of the defect and the proximity to free margins a dermal autograft was deemed most appropriate.  Using a sterile surgical marker, the primary defect shape was transferred to the donor site. The area thus outlined was incised deep to adipose tissue with a #15 scalpel blade.  The harvested graft was then trimmed of adipose and epidermal tissue until only dermis was left.  The skin graft was then placed in the primary defect and oriented appropriately.
Skin Substitute Text: The defect edges were debeveled with a #15 scalpel blade.  Given the location of the defect, shape of the defect and the proximity to free margins a skin substitute graft was deemed most appropriate.  The graft material was trimmed to fit the size of the defect. The graft was then placed in the primary defect and oriented appropriately.
Tissue Cultured Epidermal Autograft Text: The defect edges were debeveled with a #15 scalpel blade.  Given the location of the defect, shape of the defect and the proximity to free margins a tissue cultured epidermal autograft was deemed most appropriate.  The graft was then trimmed to fit the size of the defect.  The graft was then placed in the primary defect and oriented appropriately.
Xenograft Text: The defect edges were debeveled with a #15 scalpel blade.  Given the location of the defect, shape of the defect and the proximity to free margins a xenograft was deemed most appropriate.  The graft was then trimmed to fit the size of the defect.  The graft was then placed in the primary defect and oriented appropriately.
Purse String (Intermediate) Text: Given the location of the defect and the characteristics of the surrounding skin a purse string intermediate closure was deemed most appropriate.  Undermining was performed circumferentially around the surgical defect.  A purse string suture was then placed and tightened.
Purse String (Simple) Text: Given the location of the defect and the characteristics of the surrounding skin a purse string simple closure was deemed most appropriate.  Undermining was performed circumferentially around the surgical defect.  A purse string suture was then placed and tightened.
Complex Repair And Single Advancement Flap Text: The defect edges were debeveled with a #15 scalpel blade.  The primary defect was closed partially with a complex linear closure.  Given the location of the remaining defect, shape of the defect and the proximity to free margins a single advancement flap was deemed most appropriate for complete closure of the defect.  Using a sterile surgical marker, an appropriate advancement flap was drawn incorporating the defect and placing the expected incisions within the relaxed skin tension lines where possible.    The area thus outlined was incised deep to adipose tissue with a #15 scalpel blade.  The skin margins were undermined to an appropriate distance in all directions utilizing iris scissors.
Complex Repair And Double Advancement Flap Text: The defect edges were debeveled with a #15 scalpel blade.  The primary defect was closed partially with a complex linear closure.  Given the location of the remaining defect, shape of the defect and the proximity to free margins a double advancement flap was deemed most appropriate for complete closure of the defect.  Using a sterile surgical marker, an appropriate advancement flap was drawn incorporating the defect and placing the expected incisions within the relaxed skin tension lines where possible.    The area thus outlined was incised deep to adipose tissue with a #15 scalpel blade.  The skin margins were undermined to an appropriate distance in all directions utilizing iris scissors.
Complex Repair And Modified Advancement Flap Text: The defect edges were debeveled with a #15 scalpel blade.  The primary defect was closed partially with a complex linear closure.  Given the location of the remaining defect, shape of the defect and the proximity to free margins a modified advancement flap was deemed most appropriate for complete closure of the defect.  Using a sterile surgical marker, an appropriate advancement flap was drawn incorporating the defect and placing the expected incisions within the relaxed skin tension lines where possible.    The area thus outlined was incised deep to adipose tissue with a #15 scalpel blade.  The skin margins were undermined to an appropriate distance in all directions utilizing iris scissors.
Complex Repair And A-T Advancement Flap Text: The defect edges were debeveled with a #15 scalpel blade.  The primary defect was closed partially with a complex linear closure.  Given the location of the remaining defect, shape of the defect and the proximity to free margins an A-T advancement flap was deemed most appropriate for complete closure of the defect.  Using a sterile surgical marker, an appropriate advancement flap was drawn incorporating the defect and placing the expected incisions within the relaxed skin tension lines where possible.    The area thus outlined was incised deep to adipose tissue with a #15 scalpel blade.  The skin margins were undermined to an appropriate distance in all directions utilizing iris scissors.
Complex Repair And O-T Advancement Flap Text: The defect edges were debeveled with a #15 scalpel blade.  The primary defect was closed partially with a complex linear closure.  Given the location of the remaining defect, shape of the defect and the proximity to free margins an O-T advancement flap was deemed most appropriate for complete closure of the defect.  Using a sterile surgical marker, an appropriate advancement flap was drawn incorporating the defect and placing the expected incisions within the relaxed skin tension lines where possible.    The area thus outlined was incised deep to adipose tissue with a #15 scalpel blade.  The skin margins were undermined to an appropriate distance in all directions utilizing iris scissors.
Complex Repair And O-L Flap Text: The defect edges were debeveled with a #15 scalpel blade.  The primary defect was closed partially with a complex linear closure.  Given the location of the remaining defect, shape of the defect and the proximity to free margins an O-L flap was deemed most appropriate for complete closure of the defect.  Using a sterile surgical marker, an appropriate flap was drawn incorporating the defect and placing the expected incisions within the relaxed skin tension lines where possible.    The area thus outlined was incised deep to adipose tissue with a #15 scalpel blade.  The skin margins were undermined to an appropriate distance in all directions utilizing iris scissors.
Complex Repair And Bilobe Flap Text: The defect edges were debeveled with a #15 scalpel blade.  The primary defect was closed partially with a complex linear closure.  Given the location of the remaining defect, shape of the defect and the proximity to free margins a bilobe flap was deemed most appropriate for complete closure of the defect.  Using a sterile surgical marker, an appropriate advancement flap was drawn incorporating the defect and placing the expected incisions within the relaxed skin tension lines where possible.    The area thus outlined was incised deep to adipose tissue with a #15 scalpel blade.  The skin margins were undermined to an appropriate distance in all directions utilizing iris scissors.
Complex Repair And Melolabial Flap Text: The defect edges were debeveled with a #15 scalpel blade.  The primary defect was closed partially with a complex linear closure.  Given the location of the remaining defect, shape of the defect and the proximity to free margins a melolabial flap was deemed most appropriate for complete closure of the defect.  Using a sterile surgical marker, an appropriate advancement flap was drawn incorporating the defect and placing the expected incisions within the relaxed skin tension lines where possible.    The area thus outlined was incised deep to adipose tissue with a #15 scalpel blade.  The skin margins were undermined to an appropriate distance in all directions utilizing iris scissors.
Complex Repair And Rotation Flap Text: The defect edges were debeveled with a #15 scalpel blade.  The primary defect was closed partially with a complex linear closure.  Given the location of the remaining defect, shape of the defect and the proximity to free margins a rotation flap was deemed most appropriate for complete closure of the defect.  Using a sterile surgical marker, an appropriate advancement flap was drawn incorporating the defect and placing the expected incisions within the relaxed skin tension lines where possible.    The area thus outlined was incised deep to adipose tissue with a #15 scalpel blade.  The skin margins were undermined to an appropriate distance in all directions utilizing iris scissors.
Complex Repair And Rhombic Flap Text: The defect edges were debeveled with a #15 scalpel blade.  The primary defect was closed partially with a complex linear closure.  Given the location of the remaining defect, shape of the defect and the proximity to free margins a rhombic flap was deemed most appropriate for complete closure of the defect.  Using a sterile surgical marker, an appropriate advancement flap was drawn incorporating the defect and placing the expected incisions within the relaxed skin tension lines where possible.    The area thus outlined was incised deep to adipose tissue with a #15 scalpel blade.  The skin margins were undermined to an appropriate distance in all directions utilizing iris scissors.
Complex Repair And Transposition Flap Text: The defect edges were debeveled with a #15 scalpel blade.  The primary defect was closed partially with a complex linear closure.  Given the location of the remaining defect, shape of the defect and the proximity to free margins a transposition flap was deemed most appropriate for complete closure of the defect.  Using a sterile surgical marker, an appropriate advancement flap was drawn incorporating the defect and placing the expected incisions within the relaxed skin tension lines where possible.    The area thus outlined was incised deep to adipose tissue with a #15 scalpel blade.  The skin margins were undermined to an appropriate distance in all directions utilizing iris scissors.
Complex Repair And V-Y Plasty Text: The defect edges were debeveled with a #15 scalpel blade.  The primary defect was closed partially with a complex linear closure.  Given the location of the remaining defect, shape of the defect and the proximity to free margins a V-Y plasty was deemed most appropriate for complete closure of the defect.  Using a sterile surgical marker, an appropriate advancement flap was drawn incorporating the defect and placing the expected incisions within the relaxed skin tension lines where possible.    The area thus outlined was incised deep to adipose tissue with a #15 scalpel blade.  The skin margins were undermined to an appropriate distance in all directions utilizing iris scissors.
Complex Repair And M Plasty Text: The defect edges were debeveled with a #15 scalpel blade.  The primary defect was closed partially with a complex linear closure.  Given the location of the remaining defect, shape of the defect and the proximity to free margins an M plasty was deemed most appropriate for complete closure of the defect.  Using a sterile surgical marker, an appropriate advancement flap was drawn incorporating the defect and placing the expected incisions within the relaxed skin tension lines where possible.    The area thus outlined was incised deep to adipose tissue with a #15 scalpel blade.  The skin margins were undermined to an appropriate distance in all directions utilizing iris scissors.
Complex Repair And Double M Plasty Text: The defect edges were debeveled with a #15 scalpel blade.  The primary defect was closed partially with a complex linear closure.  Given the location of the remaining defect, shape of the defect and the proximity to free margins a double M plasty was deemed most appropriate for complete closure of the defect.  Using a sterile surgical marker, an appropriate advancement flap was drawn incorporating the defect and placing the expected incisions within the relaxed skin tension lines where possible.    The area thus outlined was incised deep to adipose tissue with a #15 scalpel blade.  The skin margins were undermined to an appropriate distance in all directions utilizing iris scissors.
Complex Repair And W Plasty Text: The defect edges were debeveled with a #15 scalpel blade.  The primary defect was closed partially with a complex linear closure.  Given the location of the remaining defect, shape of the defect and the proximity to free margins a W plasty was deemed most appropriate for complete closure of the defect.  Using a sterile surgical marker, an appropriate advancement flap was drawn incorporating the defect and placing the expected incisions within the relaxed skin tension lines where possible.    The area thus outlined was incised deep to adipose tissue with a #15 scalpel blade.  The skin margins were undermined to an appropriate distance in all directions utilizing iris scissors.
Complex Repair And Z Plasty Text: The defect edges were debeveled with a #15 scalpel blade.  The primary defect was closed partially with a complex linear closure.  Given the location of the remaining defect, shape of the defect and the proximity to free margins a Z plasty was deemed most appropriate for complete closure of the defect.  Using a sterile surgical marker, an appropriate advancement flap was drawn incorporating the defect and placing the expected incisions within the relaxed skin tension lines where possible.    The area thus outlined was incised deep to adipose tissue with a #15 scalpel blade.  The skin margins were undermined to an appropriate distance in all directions utilizing iris scissors.
Complex Repair And Dorsal Nasal Flap Text: The defect edges were debeveled with a #15 scalpel blade.  The primary defect was closed partially with a complex linear closure.  Given the location of the remaining defect, shape of the defect and the proximity to free margins a dorsal nasal flap was deemed most appropriate for complete closure of the defect.  Using a sterile surgical marker, an appropriate flap was drawn incorporating the defect and placing the expected incisions within the relaxed skin tension lines where possible.    The area thus outlined was incised deep to adipose tissue with a #15 scalpel blade.  The skin margins were undermined to an appropriate distance in all directions utilizing iris scissors.
Complex Repair And Ftsg Text: The defect edges were debeveled with a #15 scalpel blade.  The primary defect was closed partially with a complex linear closure.  Given the location of the defect, shape of the defect and the proximity to free margins a full thickness skin graft was deemed most appropriate to repair the remaining defect.  The graft was trimmed to fit the size of the remaining defect.  The graft was then placed in the primary defect, oriented appropriately, and sutured into place.
Complex Repair And Burow's Graft Text: The defect edges were debeveled with a #15 scalpel blade.  The primary defect was closed partially with a complex linear closure.  Given the location of the defect, shape of the defect, the proximity to free margins and the presence of a standing cone deformity a Burow's graft was deemed most appropriate to repair the remaining defect.  The graft was trimmed to fit the size of the remaining defect.  The graft was then placed in the primary defect, oriented appropriately, and sutured into place.
Complex Repair And Split-Thickness Skin Graft Text: The defect edges were debeveled with a #15 scalpel blade.  The primary defect was closed partially with a complex linear closure.  Given the location of the defect, shape of the defect and the proximity to free margins a split thickness skin graft was deemed most appropriate to repair the remaining defect.  The graft was trimmed to fit the size of the remaining defect.  The graft was then placed in the primary defect, oriented appropriately, and sutured into place.
Complex Repair And Epidermal Autograft Text: The defect edges were debeveled with a #15 scalpel blade.  The primary defect was closed partially with a complex linear closure.  Given the location of the defect, shape of the defect and the proximity to free margins an epidermal autograft was deemed most appropriate to repair the remaining defect.  The graft was trimmed to fit the size of the remaining defect.  The graft was then placed in the primary defect, oriented appropriately, and sutured into place.
Complex Repair And Dermal Autograft Text: The defect edges were debeveled with a #15 scalpel blade.  The primary defect was closed partially with a complex linear closure.  Given the location of the defect, shape of the defect and the proximity to free margins an dermal autograft was deemed most appropriate to repair the remaining defect.  The graft was trimmed to fit the size of the remaining defect.  The graft was then placed in the primary defect, oriented appropriately, and sutured into place.
Complex Repair And Tissue Cultured Epidermal Autograft Text: The defect edges were debeveled with a #15 scalpel blade.  The primary defect was closed partially with a complex linear closure.  Given the location of the defect, shape of the defect and the proximity to free margins an tissue cultured epidermal autograft was deemed most appropriate to repair the remaining defect.  The graft was trimmed to fit the size of the remaining defect.  The graft was then placed in the primary defect, oriented appropriately, and sutured into place.
Complex Repair And Xenograft Text: The defect edges were debeveled with a #15 scalpel blade.  The primary defect was closed partially with a complex linear closure.  Given the location of the defect, shape of the defect and the proximity to free margins a xenograft was deemed most appropriate to repair the remaining defect.  The graft was trimmed to fit the size of the remaining defect.  The graft was then placed in the primary defect, oriented appropriately, and sutured into place.
Complex Repair And Skin Substitute Graft Text: The defect edges were debeveled with a #15 scalpel blade.  The primary defect was closed partially with a complex linear closure.  Given the location of the remaining defect, shape of the defect and the proximity to free margins a skin substitute graft was deemed most appropriate to repair the remaining defect.  The graft was trimmed to fit the size of the remaining defect.  The graft was then placed in the primary defect, oriented appropriately, and sutured into place.
Path Notes (To The Dermatopathologist): Please check margins.
Consent was obtained from the patient. The risks and benefits to therapy were discussed in detail. Specifically, the risks of infection, scarring, bleeding, prolonged wound healing, incomplete removal, allergy to anesthesia, nerve injury and recurrence were addressed. Prior to the procedure, the treatment site was clearly identified and confirmed by the patient. All components of Universal Protocol/PAUSE Rule completed.
Post-Care Instructions: I reviewed with the patient in detail post-care instructions. Patient is not to engage in any heavy lifting, exercise, or swimming for the next 14 days. Should the patient develop any fevers, chills, bleeding, severe pain patient will contact the office immediately.
Home Suture Removal Text: Patient was provided a home suture removal kit and will remove their sutures at home.  If they have any questions or difficulties they will call the office.
Where Do You Want The Question To Include Opioid Counseling Located?: Case Summary Tab
Billing Type: Third-Party Bill
Information: Selecting Yes will display possible errors in your note based on the variables you have selected. This validation is only offered as a suggestion for you. PLEASE NOTE THAT THE VALIDATION TEXT WILL BE REMOVED WHEN YOU FINALIZE YOUR NOTE. IF YOU WANT TO FAX A PRELIMINARY NOTE YOU WILL NEED TO TOGGLE THIS TO 'NO' IF YOU DO NOT WANT IT IN YOUR FAXED NOTE.

## 2023-04-05 ENCOUNTER — APPOINTMENT (RX ONLY)
Dept: URBAN - METROPOLITAN AREA CLINIC 164 | Facility: CLINIC | Age: 63
Setting detail: DERMATOLOGY
End: 2023-04-05

## 2023-04-05 DIAGNOSIS — Z48.02 ENCOUNTER FOR REMOVAL OF SUTURES: ICD-10-CM

## 2023-04-05 PROCEDURE — ? SUTURE REMOVAL (GLOBAL PERIOD)

## 2023-04-05 PROCEDURE — 99024 POSTOP FOLLOW-UP VISIT: CPT

## 2023-04-05 ASSESSMENT — LOCATION DETAILED DESCRIPTION DERM: LOCATION DETAILED: LEFT MEDIAL TRAPEZIAL NECK

## 2023-04-05 ASSESSMENT — LOCATION SIMPLE DESCRIPTION DERM: LOCATION SIMPLE: POSTERIOR NECK

## 2023-04-05 ASSESSMENT — LOCATION ZONE DERM: LOCATION ZONE: NECK

## 2023-04-05 NOTE — PROCEDURE: SUTURE REMOVAL (GLOBAL PERIOD)
Detail Level: Detailed
Add 32040 Cpt? (Important Note: In 2017 The Use Of 28160 Is Being Tracked By Cms To Determine Future Global Period Reimbursement For Global Periods): yes
Add 1585x Cpt? (Do Not Bill If You Billed For The Procedure Placing The Sutures. This Is An Add-On Code That Must Be Billed With An E/M Visit Code): No

## 2024-06-30 ENCOUNTER — HOSPITAL ENCOUNTER (INPATIENT)
Facility: HOSPITAL | Age: 64
LOS: 3 days | Discharge: LEFT BEFORE DISCHARGE | DRG: 321 | End: 2024-07-04
Attending: EMERGENCY MEDICINE | Admitting: INTERNAL MEDICINE
Payer: MEDICARE

## 2024-06-30 ENCOUNTER — HOSPITAL ENCOUNTER (EMERGENCY)
Facility: HOSPITAL | Age: 64
Discharge: ANOTHER HOSPITAL | End: 2024-06-30
Attending: INTERNAL MEDICINE
Payer: MEDICARE

## 2024-06-30 VITALS
SYSTOLIC BLOOD PRESSURE: 115 MMHG | RESPIRATION RATE: 18 BRPM | BODY MASS INDEX: 26.44 KG/M2 | OXYGEN SATURATION: 98 % | WEIGHT: 168.44 LBS | HEIGHT: 67 IN | HEART RATE: 69 BPM | TEMPERATURE: 98 F | DIASTOLIC BLOOD PRESSURE: 62 MMHG

## 2024-06-30 DIAGNOSIS — I25.10 CAD (CORONARY ARTERY DISEASE): ICD-10-CM

## 2024-06-30 DIAGNOSIS — I10 HYPERTENSION, UNSPECIFIED TYPE: ICD-10-CM

## 2024-06-30 DIAGNOSIS — R78.81 MSSA BACTEREMIA: ICD-10-CM

## 2024-06-30 DIAGNOSIS — R07.9 CHEST PAIN: ICD-10-CM

## 2024-06-30 DIAGNOSIS — I21.4 NSTEMI (NON-ST ELEVATED MYOCARDIAL INFARCTION): Primary | ICD-10-CM

## 2024-06-30 DIAGNOSIS — Z91.199 NONCOMPLIANCE: ICD-10-CM

## 2024-06-30 DIAGNOSIS — E11.9 TYPE 2 DIABETES MELLITUS WITHOUT COMPLICATION, WITH LONG-TERM CURRENT USE OF INSULIN: ICD-10-CM

## 2024-06-30 DIAGNOSIS — E11.65 UNCONTROLLED TYPE 2 DIABETES MELLITUS WITH HYPERGLYCEMIA: ICD-10-CM

## 2024-06-30 DIAGNOSIS — Z79.4 TYPE 2 DIABETES MELLITUS WITHOUT COMPLICATION, WITH LONG-TERM CURRENT USE OF INSULIN: ICD-10-CM

## 2024-06-30 DIAGNOSIS — B95.61 MSSA BACTEREMIA: ICD-10-CM

## 2024-06-30 DIAGNOSIS — I10 BENIGN ESSENTIAL HTN: ICD-10-CM

## 2024-06-30 LAB
ALBUMIN SERPL-MCNC: 4.2 G/DL (ref 3.4–4.8)
ALBUMIN/GLOB SERPL: 1.1 RATIO (ref 1.1–2)
ALP SERPL-CCNC: 86 UNIT/L (ref 40–150)
ALT SERPL-CCNC: 39 UNIT/L (ref 0–55)
AMPHET UR QL SCN: NEGATIVE
ANION GAP SERPL CALC-SCNC: 9 MEQ/L
APICAL FOUR CHAMBER EJECTION FRACTION: 64 %
APICAL TWO CHAMBER EJECTION FRACTION: 56 %
APTT PPP: 27.7 SECONDS (ref 24.6–37.2)
APTT PPP: 34 SECONDS (ref 23.2–33.7)
AST SERPL-CCNC: 63 UNIT/L (ref 5–34)
AV INDEX (PROSTH): 0.65
AV MEAN GRADIENT: 5 MMHG
AV PEAK GRADIENT: 9 MMHG
AV VALVE AREA BY VELOCITY RATIO: 2.59 CM²
AV VALVE AREA: 2.26 CM²
AV VELOCITY RATIO: 0.75
BACTERIA #/AREA URNS AUTO: ABNORMAL /HPF
BARBITURATE SCN PRESENT UR: NEGATIVE
BASOPHILS # BLD AUTO: 0.07 X10(3)/MCL
BASOPHILS # BLD AUTO: 0.09 X10(3)/MCL
BASOPHILS NFR BLD AUTO: 0.5 %
BASOPHILS NFR BLD AUTO: 0.6 %
BENZODIAZ UR QL SCN: NEGATIVE
BILIRUB SERPL-MCNC: 0.4 MG/DL
BILIRUB UR QL STRIP.AUTO: NEGATIVE
BNP BLD-MCNC: 92.1 PG/ML
BSA FOR ECHO PROCEDURE: 1.89 M2
BUN SERPL-MCNC: 20 MG/DL (ref 8.4–25.7)
CALCIUM SERPL-MCNC: 9.5 MG/DL (ref 8.8–10)
CANNABINOIDS UR QL SCN: NEGATIVE
CHLORIDE SERPL-SCNC: 102 MMOL/L (ref 98–107)
CHOLEST SERPL-MCNC: 216 MG/DL
CHOLEST/HDLC SERPL: 7 {RATIO} (ref 0–5)
CLARITY UR: ABNORMAL
CO2 SERPL-SCNC: 26 MMOL/L (ref 23–31)
COCAINE UR QL SCN: NEGATIVE
COLOR UR AUTO: YELLOW
CREAT SERPL-MCNC: 0.89 MG/DL (ref 0.73–1.18)
CREAT/UREA NIT SERPL: 22
CV ECHO LV RWT: 0.4 CM
DOP CALC AO PEAK VEL: 1.51 M/S
DOP CALC AO VTI: 31.4 CM
DOP CALC LVOT AREA: 3.5 CM2
DOP CALC LVOT DIAMETER: 2.1 CM
DOP CALC LVOT PEAK VEL: 1.13 M/S
DOP CALC LVOT STROKE VOLUME: 70.97 CM3
DOP CALC MV VTI: 33.5 CM
DOP CALCLVOT PEAK VEL VTI: 20.5 CM
E WAVE DECELERATION TIME: 214 MSEC
E/A RATIO: 0.79
E/E' RATIO: 11 M/S
ECHO LV POSTERIOR WALL: 0.99 CM (ref 0.6–1.1)
EOSINOPHIL # BLD AUTO: 0.06 X10(3)/MCL (ref 0–0.9)
EOSINOPHIL # BLD AUTO: 0.1 X10(3)/MCL (ref 0–0.9)
EOSINOPHIL NFR BLD AUTO: 0.5 %
EOSINOPHIL NFR BLD AUTO: 0.7 %
ERYTHROCYTE [DISTWIDTH] IN BLOOD BY AUTOMATED COUNT: 15.4 % (ref 11.5–17)
ERYTHROCYTE [DISTWIDTH] IN BLOOD BY AUTOMATED COUNT: 15.5 % (ref 11.5–17)
EST. AVERAGE GLUCOSE BLD GHB EST-MCNC: 165.7 MG/DL
ETHANOL SERPL-MCNC: <10 MG/DL
FENTANYL UR QL SCN: NEGATIVE
FRACTIONAL SHORTENING: 37 % (ref 28–44)
GFR SERPLBLD CREATININE-BSD FMLA CKD-EPI: >60 ML/MIN/1.73/M2
GLOBULIN SER-MCNC: 3.8 GM/DL (ref 2.4–3.5)
GLUCOSE SERPL-MCNC: 221 MG/DL (ref 82–115)
GLUCOSE UR QL STRIP: ABNORMAL
HBA1C MFR BLD: 7.4 %
HCT VFR BLD AUTO: 41.6 % (ref 42–52)
HCT VFR BLD AUTO: 43.8 % (ref 42–52)
HDLC SERPL-MCNC: 33 MG/DL (ref 35–60)
HGB BLD-MCNC: 13.9 G/DL (ref 14–18)
HGB BLD-MCNC: 14.8 G/DL (ref 14–18)
HGB UR QL STRIP: ABNORMAL
IMM GRANULOCYTES # BLD AUTO: 0.05 X10(3)/MCL (ref 0–0.04)
IMM GRANULOCYTES # BLD AUTO: 0.09 X10(3)/MCL (ref 0–0.04)
IMM GRANULOCYTES NFR BLD AUTO: 0.3 %
IMM GRANULOCYTES NFR BLD AUTO: 0.7 %
INR PPP: 1
INR PPP: 1.1
INTERVENTRICULAR SEPTUM: 1.01 CM (ref 0.6–1.1)
KETONES UR QL STRIP: NEGATIVE
LDLC SERPL CALC-MCNC: 105 MG/DL (ref 50–140)
LEFT ATRIUM AREA SYSTOLIC (APICAL 2 CHAMBER): 17.4 CM2
LEFT ATRIUM AREA SYSTOLIC (APICAL 4 CHAMBER): 13.3 CM2
LEFT ATRIUM SIZE: 3.7 CM
LEFT INTERNAL DIMENSION IN SYSTOLE: 3.1 CM (ref 2.1–4)
LEFT VENTRICLE DIASTOLIC VOLUME INDEX: 62.03 ML/M2
LEFT VENTRICLE DIASTOLIC VOLUME: 116 ML
LEFT VENTRICLE END DIASTOLIC VOLUME APICAL 2 CHAMBER: 80.7 ML
LEFT VENTRICLE END DIASTOLIC VOLUME APICAL 4 CHAMBER: 101 ML
LEFT VENTRICLE END SYSTOLIC VOLUME APICAL 2 CHAMBER: 46.4 ML
LEFT VENTRICLE END SYSTOLIC VOLUME APICAL 4 CHAMBER: 34.6 ML
LEFT VENTRICLE MASS INDEX: 96 G/M2
LEFT VENTRICLE SYSTOLIC VOLUME INDEX: 20.3 ML/M2
LEFT VENTRICLE SYSTOLIC VOLUME: 37.9 ML
LEFT VENTRICULAR INTERNAL DIMENSION IN DIASTOLE: 4.95 CM (ref 3.5–6)
LEFT VENTRICULAR MASS: 178.99 G
LEUKOCYTE ESTERASE UR QL STRIP: NEGATIVE
LV LATERAL E/E' RATIO: 11 M/S
LV SEPTAL E/E' RATIO: 11 M/S
LVED V (TEICH): 116 ML
LVES V (TEICH): 37.9 ML
LVOT MG: 2 MMHG
LVOT MV: 0.69 CM/S
LYMPHOCYTES # BLD AUTO: 1.75 X10(3)/MCL (ref 0.6–4.6)
LYMPHOCYTES # BLD AUTO: 1.8 X10(3)/MCL (ref 0.6–4.6)
LYMPHOCYTES NFR BLD AUTO: 12.2 %
LYMPHOCYTES NFR BLD AUTO: 13.7 %
MAGNESIUM SERPL-MCNC: 2 MG/DL (ref 1.6–2.6)
MCH RBC QN AUTO: 27.7 PG (ref 27–31)
MCH RBC QN AUTO: 27.9 PG (ref 27–31)
MCHC RBC AUTO-ENTMCNC: 33.4 G/DL (ref 33–36)
MCHC RBC AUTO-ENTMCNC: 33.8 G/DL (ref 33–36)
MCV RBC AUTO: 82 FL (ref 80–94)
MCV RBC AUTO: 83.5 FL (ref 80–94)
MDMA UR QL SCN: NEGATIVE
MONOCYTES # BLD AUTO: 0.85 X10(3)/MCL (ref 0.1–1.3)
MONOCYTES # BLD AUTO: 0.86 X10(3)/MCL (ref 0.1–1.3)
MONOCYTES NFR BLD AUTO: 5.9 %
MONOCYTES NFR BLD AUTO: 6.6 %
MUCOUS THREADS URNS QL MICRO: ABNORMAL /LPF
MV MEAN GRADIENT: 2 MMHG
MV PEAK A VEL: 1.25 M/S
MV PEAK E VEL: 0.99 M/S
MV PEAK GRADIENT: 5 MMHG
MV VALVE AREA BY CONTINUITY EQUATION: 2.12 CM2
NEUTROPHILS # BLD AUTO: 10.22 X10(3)/MCL (ref 2.1–9.2)
NEUTROPHILS # BLD AUTO: 11.52 X10(3)/MCL (ref 2.1–9.2)
NEUTROPHILS NFR BLD AUTO: 78 %
NEUTROPHILS NFR BLD AUTO: 80.3 %
NITRITE UR QL STRIP: NEGATIVE
NRBC BLD AUTO-RTO: 0 %
OHS CV RV/LV RATIO: 0.69 CM
OHS LV EJECTION FRACTION SIMPSONS BIPLANE MOD: 61 %
OHS QRS DURATION: 86 MS
OHS QRS DURATION: 90 MS
OHS QRS DURATION: 98 MS
OHS QTC CALCULATION: 405 MS
OHS QTC CALCULATION: 413 MS
OHS QTC CALCULATION: 433 MS
OPIATES UR QL SCN: NEGATIVE
PCP UR QL: NEGATIVE
PH UR STRIP: 6 [PH]
PH UR: 6 [PH] (ref 3–11)
PLATELET # BLD AUTO: 193 X10(3)/MCL (ref 130–400)
PLATELET # BLD AUTO: 237 X10(3)/MCL (ref 130–400)
PMV BLD AUTO: 10.7 FL (ref 7.4–10.4)
PMV BLD AUTO: 11.2 FL (ref 7.4–10.4)
POCT GLUCOSE: 262 MG/DL (ref 70–110)
POTASSIUM SERPL-SCNC: 4.1 MMOL/L (ref 3.5–5.1)
PROT SERPL-MCNC: 8 GM/DL (ref 5.8–7.6)
PROT UR QL STRIP: ABNORMAL
PROTHROMBIN TIME: 13.2 SECONDS (ref 12.5–14.5)
PROTHROMBIN TIME: 13.6 SECONDS (ref 12.5–14.5)
RA MAJOR: 4.53 CM
RA WIDTH: 3.63 CM
RBC # BLD AUTO: 4.98 X10(6)/MCL (ref 4.7–6.1)
RBC # BLD AUTO: 5.34 X10(6)/MCL (ref 4.7–6.1)
RBC #/AREA URNS AUTO: >100 /HPF
RIGHT VENTRICULAR END-DIASTOLIC DIMENSION: 3.4 CM
SODIUM SERPL-SCNC: 137 MMOL/L (ref 136–145)
SP GR UR STRIP.AUTO: >=1.03 (ref 1–1.03)
SPECIFIC GRAVITY, URINE AUTO (.000) (OHS): >=1.03 (ref 1–1.03)
SQUAMOUS #/AREA URNS AUTO: ABNORMAL /HPF
TDI LATERAL: 0.09 M/S
TDI SEPTAL: 0.09 M/S
TDI: 0.09 M/S
TRICUSPID ANNULAR PLANE SYSTOLIC EXCURSION: 2.09 CM
TRIGL SERPL-MCNC: 389 MG/DL (ref 34–140)
TROPONIN I SERPL-MCNC: 12.32 NG/ML (ref 0–0.04)
TROPONIN I SERPL-MCNC: 16.55 NG/ML (ref 0–0.04)
TROPONIN I SERPL-MCNC: 18.91 NG/ML (ref 0–0.04)
TROPONIN I SERPL-MCNC: 21.22 NG/ML (ref 0–0.04)
TROPONIN I SERPL-MCNC: 60.26 NG/ML (ref 0–0.04)
UROBILINOGEN UR STRIP-ACNC: 0.2
VLDLC SERPL CALC-MCNC: 78 MG/DL
WBC # BLD AUTO: 13.1 X10(3)/MCL (ref 4.5–11.5)
WBC # BLD AUTO: 14.36 X10(3)/MCL (ref 4.5–11.5)
WBC #/AREA URNS AUTO: ABNORMAL /HPF
Z-SCORE OF LEFT VENTRICULAR DIMENSION IN END DIASTOLE: -0.52
Z-SCORE OF LEFT VENTRICULAR DIMENSION IN END SYSTOLE: -0.29

## 2024-06-30 PROCEDURE — C9600 PERC DRUG-EL COR STENT SING: HCPCS | Mod: RC | Performed by: STUDENT IN AN ORGANIZED HEALTH CARE EDUCATION/TRAINING PROGRAM

## 2024-06-30 PROCEDURE — 25000242 PHARM REV CODE 250 ALT 637 W/ HCPCS: Performed by: INTERNAL MEDICINE

## 2024-06-30 PROCEDURE — 96366 THER/PROPH/DIAG IV INF ADDON: CPT

## 2024-06-30 PROCEDURE — C1894 INTRO/SHEATH, NON-LASER: HCPCS | Performed by: STUDENT IN AN ORGANIZED HEALTH CARE EDUCATION/TRAINING PROGRAM

## 2024-06-30 PROCEDURE — 96367 TX/PROPH/DG ADDL SEQ IV INF: CPT

## 2024-06-30 PROCEDURE — 92978 ENDOLUMINL IVUS OCT C 1ST: CPT | Mod: RC | Performed by: STUDENT IN AN ORGANIZED HEALTH CARE EDUCATION/TRAINING PROGRAM

## 2024-06-30 PROCEDURE — 93005 ELECTROCARDIOGRAM TRACING: CPT

## 2024-06-30 PROCEDURE — 80061 LIPID PANEL: CPT | Performed by: NURSE PRACTITIONER

## 2024-06-30 PROCEDURE — 99291 CRITICAL CARE FIRST HOUR: CPT

## 2024-06-30 PROCEDURE — 25000003 PHARM REV CODE 250: Performed by: STUDENT IN AN ORGANIZED HEALTH CARE EDUCATION/TRAINING PROGRAM

## 2024-06-30 PROCEDURE — 99152 MOD SED SAME PHYS/QHP 5/>YRS: CPT | Performed by: STUDENT IN AN ORGANIZED HEALTH CARE EDUCATION/TRAINING PROGRAM

## 2024-06-30 PROCEDURE — C1753 CATH, INTRAVAS ULTRASOUND: HCPCS | Performed by: STUDENT IN AN ORGANIZED HEALTH CARE EDUCATION/TRAINING PROGRAM

## 2024-06-30 PROCEDURE — 84484 ASSAY OF TROPONIN QUANT: CPT | Mod: 91 | Performed by: EMERGENCY MEDICINE

## 2024-06-30 PROCEDURE — G0378 HOSPITAL OBSERVATION PER HR: HCPCS

## 2024-06-30 PROCEDURE — C1887 CATHETER, GUIDING: HCPCS | Performed by: STUDENT IN AN ORGANIZED HEALTH CARE EDUCATION/TRAINING PROGRAM

## 2024-06-30 PROCEDURE — 84484 ASSAY OF TROPONIN QUANT: CPT | Mod: 91 | Performed by: NURSE PRACTITIONER

## 2024-06-30 PROCEDURE — 83036 HEMOGLOBIN GLYCOSYLATED A1C: CPT | Performed by: NURSE PRACTITIONER

## 2024-06-30 PROCEDURE — 63600175 PHARM REV CODE 636 W HCPCS: Mod: JZ,JG | Performed by: EMERGENCY MEDICINE

## 2024-06-30 PROCEDURE — C1769 GUIDE WIRE: HCPCS | Performed by: STUDENT IN AN ORGANIZED HEALTH CARE EDUCATION/TRAINING PROGRAM

## 2024-06-30 PROCEDURE — B2111ZZ FLUOROSCOPY OF MULTIPLE CORONARY ARTERIES USING LOW OSMOLAR CONTRAST: ICD-10-PCS | Performed by: STUDENT IN AN ORGANIZED HEALTH CARE EDUCATION/TRAINING PROGRAM

## 2024-06-30 PROCEDURE — 85730 THROMBOPLASTIN TIME PARTIAL: CPT | Performed by: INTERNAL MEDICINE

## 2024-06-30 PROCEDURE — 93458 L HRT ARTERY/VENTRICLE ANGIO: CPT | Performed by: STUDENT IN AN ORGANIZED HEALTH CARE EDUCATION/TRAINING PROGRAM

## 2024-06-30 PROCEDURE — 80053 COMPREHEN METABOLIC PANEL: CPT | Performed by: INTERNAL MEDICINE

## 2024-06-30 PROCEDURE — 93010 ELECTROCARDIOGRAM REPORT: CPT | Mod: 76,,, | Performed by: INTERNAL MEDICINE

## 2024-06-30 PROCEDURE — 027035Z DILATION OF CORONARY ARTERY, ONE ARTERY WITH TWO DRUG-ELUTING INTRALUMINAL DEVICES, PERCUTANEOUS APPROACH: ICD-10-PCS | Performed by: STUDENT IN AN ORGANIZED HEALTH CARE EDUCATION/TRAINING PROGRAM

## 2024-06-30 PROCEDURE — 80307 DRUG TEST PRSMV CHEM ANLYZR: CPT | Performed by: INTERNAL MEDICINE

## 2024-06-30 PROCEDURE — C1725 CATH, TRANSLUMIN NON-LASER: HCPCS | Performed by: STUDENT IN AN ORGANIZED HEALTH CARE EDUCATION/TRAINING PROGRAM

## 2024-06-30 PROCEDURE — 85025 COMPLETE CBC W/AUTO DIFF WBC: CPT | Performed by: INTERNAL MEDICINE

## 2024-06-30 PROCEDURE — 96365 THER/PROPH/DIAG IV INF INIT: CPT

## 2024-06-30 PROCEDURE — 82077 ASSAY SPEC XCP UR&BREATH IA: CPT | Performed by: INTERNAL MEDICINE

## 2024-06-30 PROCEDURE — 25000003 PHARM REV CODE 250: Performed by: NURSE PRACTITIONER

## 2024-06-30 PROCEDURE — 25000003 PHARM REV CODE 250: Performed by: INTERNAL MEDICINE

## 2024-06-30 PROCEDURE — 85025 COMPLETE CBC W/AUTO DIFF WBC: CPT | Performed by: EMERGENCY MEDICINE

## 2024-06-30 PROCEDURE — 96372 THER/PROPH/DIAG INJ SC/IM: CPT | Performed by: NURSE PRACTITIONER

## 2024-06-30 PROCEDURE — 85610 PROTHROMBIN TIME: CPT | Performed by: EMERGENCY MEDICINE

## 2024-06-30 PROCEDURE — 85347 COAGULATION TIME ACTIVATED: CPT | Performed by: STUDENT IN AN ORGANIZED HEALTH CARE EDUCATION/TRAINING PROGRAM

## 2024-06-30 PROCEDURE — 83880 ASSAY OF NATRIURETIC PEPTIDE: CPT | Performed by: INTERNAL MEDICINE

## 2024-06-30 PROCEDURE — 63600175 PHARM REV CODE 636 W HCPCS: Mod: JZ,JG | Performed by: STUDENT IN AN ORGANIZED HEALTH CARE EDUCATION/TRAINING PROGRAM

## 2024-06-30 PROCEDURE — 81001 URINALYSIS AUTO W/SCOPE: CPT | Performed by: INTERNAL MEDICINE

## 2024-06-30 PROCEDURE — 25000003 PHARM REV CODE 250: Performed by: EMERGENCY MEDICINE

## 2024-06-30 PROCEDURE — 83735 ASSAY OF MAGNESIUM: CPT | Performed by: NURSE PRACTITIONER

## 2024-06-30 PROCEDURE — 85610 PROTHROMBIN TIME: CPT | Performed by: INTERNAL MEDICINE

## 2024-06-30 PROCEDURE — C1874 STENT, COATED/COV W/DEL SYS: HCPCS | Performed by: STUDENT IN AN ORGANIZED HEALTH CARE EDUCATION/TRAINING PROGRAM

## 2024-06-30 PROCEDURE — B240ZZ3 ULTRASONOGRAPHY OF SINGLE CORONARY ARTERY, INTRAVASCULAR: ICD-10-PCS | Performed by: STUDENT IN AN ORGANIZED HEALTH CARE EDUCATION/TRAINING PROGRAM

## 2024-06-30 PROCEDURE — 4A023N7 MEASUREMENT OF CARDIAC SAMPLING AND PRESSURE, LEFT HEART, PERCUTANEOUS APPROACH: ICD-10-PCS | Performed by: STUDENT IN AN ORGANIZED HEALTH CARE EDUCATION/TRAINING PROGRAM

## 2024-06-30 PROCEDURE — 81003 URINALYSIS AUTO W/O SCOPE: CPT | Performed by: INTERNAL MEDICINE

## 2024-06-30 PROCEDURE — 85730 THROMBOPLASTIN TIME PARTIAL: CPT | Performed by: EMERGENCY MEDICINE

## 2024-06-30 PROCEDURE — 63600175 PHARM REV CODE 636 W HCPCS: Performed by: STUDENT IN AN ORGANIZED HEALTH CARE EDUCATION/TRAINING PROGRAM

## 2024-06-30 PROCEDURE — 63600175 PHARM REV CODE 636 W HCPCS: Performed by: INTERNAL MEDICINE

## 2024-06-30 PROCEDURE — 25500020 PHARM REV CODE 255: Performed by: STUDENT IN AN ORGANIZED HEALTH CARE EDUCATION/TRAINING PROGRAM

## 2024-06-30 PROCEDURE — 63600175 PHARM REV CODE 636 W HCPCS: Performed by: NURSE PRACTITIONER

## 2024-06-30 PROCEDURE — 84484 ASSAY OF TROPONIN QUANT: CPT | Performed by: INTERNAL MEDICINE

## 2024-06-30 PROCEDURE — 96374 THER/PROPH/DIAG INJ IV PUSH: CPT

## 2024-06-30 PROCEDURE — 36415 COLL VENOUS BLD VENIPUNCTURE: CPT | Performed by: EMERGENCY MEDICINE

## 2024-06-30 PROCEDURE — 36415 COLL VENOUS BLD VENIPUNCTURE: CPT | Mod: 91 | Performed by: NURSE PRACTITIONER

## 2024-06-30 PROCEDURE — 96375 TX/PRO/DX INJ NEW DRUG ADDON: CPT

## 2024-06-30 PROCEDURE — 99153 MOD SED SAME PHYS/QHP EA: CPT | Performed by: STUDENT IN AN ORGANIZED HEALTH CARE EDUCATION/TRAINING PROGRAM

## 2024-06-30 DEVICE — EVEROLIMUS-ELUTING PLATINUM CHROMIUM CORONARY STENT SYSTEM
Type: IMPLANTABLE DEVICE | Site: CORONARY | Status: FUNCTIONAL
Brand: SYNERGY™ XD

## 2024-06-30 RX ORDER — VALSARTAN 40 MG/1
40 TABLET ORAL DAILY
Status: DISCONTINUED | OUTPATIENT
Start: 2024-07-01 | End: 2024-07-04 | Stop reason: HOSPADM

## 2024-06-30 RX ORDER — DIPHENHYDRAMINE HYDROCHLORIDE 50 MG/ML
INJECTION INTRAMUSCULAR; INTRAVENOUS
Status: DISCONTINUED | OUTPATIENT
Start: 2024-06-30 | End: 2024-06-30 | Stop reason: HOSPADM

## 2024-06-30 RX ORDER — ASPIRIN 81 MG/1
81 TABLET ORAL DAILY
Status: DISCONTINUED | OUTPATIENT
Start: 2024-06-30 | End: 2024-07-04 | Stop reason: HOSPADM

## 2024-06-30 RX ORDER — TIROFIBAN HYDROCHLORIDE 50 UG/ML
0.15 INJECTION INTRAVENOUS CONTINUOUS
Status: DISPENSED | OUTPATIENT
Start: 2024-06-30 | End: 2024-06-30

## 2024-06-30 RX ORDER — GLUCAGON 1 MG
1 KIT INJECTION
Status: DISCONTINUED | OUTPATIENT
Start: 2024-06-30 | End: 2024-07-04 | Stop reason: HOSPADM

## 2024-06-30 RX ORDER — LIDOCAINE HYDROCHLORIDE 10 MG/ML
INJECTION, SOLUTION EPIDURAL; INFILTRATION; INTRACAUDAL; PERINEURAL
Status: DISCONTINUED | OUTPATIENT
Start: 2024-06-30 | End: 2024-06-30 | Stop reason: HOSPADM

## 2024-06-30 RX ORDER — ASPIRIN 81 MG/1
324 TABLET ORAL
Status: COMPLETED | OUTPATIENT
Start: 2024-06-30 | End: 2024-06-30

## 2024-06-30 RX ORDER — NITROGLYCERIN 0.4 MG/1
0.4 TABLET SUBLINGUAL
Status: COMPLETED | OUTPATIENT
Start: 2024-06-30 | End: 2024-06-30

## 2024-06-30 RX ORDER — ONDANSETRON HYDROCHLORIDE 2 MG/ML
4 INJECTION, SOLUTION INTRAVENOUS
Status: COMPLETED | OUTPATIENT
Start: 2024-06-30 | End: 2024-06-30

## 2024-06-30 RX ORDER — IBUPROFEN 200 MG
24 TABLET ORAL
Status: DISCONTINUED | OUTPATIENT
Start: 2024-06-30 | End: 2024-07-04 | Stop reason: HOSPADM

## 2024-06-30 RX ORDER — INSULIN ASPART 100 [IU]/ML
0-5 INJECTION, SOLUTION INTRAVENOUS; SUBCUTANEOUS
Status: DISCONTINUED | OUTPATIENT
Start: 2024-06-30 | End: 2024-07-04 | Stop reason: HOSPADM

## 2024-06-30 RX ORDER — HEPARIN SODIUM,PORCINE/D5W 25000/250
0-40 INTRAVENOUS SOLUTION INTRAVENOUS CONTINUOUS
Status: DISCONTINUED | OUTPATIENT
Start: 2024-06-30 | End: 2024-06-30

## 2024-06-30 RX ORDER — MORPHINE SULFATE 4 MG/ML
4 INJECTION, SOLUTION INTRAMUSCULAR; INTRAVENOUS EVERY 4 HOURS PRN
Status: DISCONTINUED | OUTPATIENT
Start: 2024-06-30 | End: 2024-07-04 | Stop reason: HOSPADM

## 2024-06-30 RX ORDER — MIDAZOLAM HYDROCHLORIDE 1 MG/ML
INJECTION INTRAMUSCULAR; INTRAVENOUS
Status: DISCONTINUED | OUTPATIENT
Start: 2024-06-30 | End: 2024-06-30 | Stop reason: HOSPADM

## 2024-06-30 RX ORDER — ONDANSETRON HYDROCHLORIDE 2 MG/ML
4 INJECTION, SOLUTION INTRAVENOUS EVERY 8 HOURS PRN
Status: DISCONTINUED | OUTPATIENT
Start: 2024-06-30 | End: 2024-07-04 | Stop reason: HOSPADM

## 2024-06-30 RX ORDER — TIROFIBAN HYDROCHLORIDE 50 UG/ML
INJECTION INTRAVENOUS
Status: DISCONTINUED | OUTPATIENT
Start: 2024-06-30 | End: 2024-06-30

## 2024-06-30 RX ORDER — ALUMINUM HYDROXIDE, MAGNESIUM HYDROXIDE, AND SIMETHICONE 1200; 120; 1200 MG/30ML; MG/30ML; MG/30ML
30 SUSPENSION ORAL ONCE
Status: COMPLETED | OUTPATIENT
Start: 2024-06-30 | End: 2024-06-30

## 2024-06-30 RX ORDER — HEPARIN SODIUM,PORCINE/D5W 25000/250
0-40 INTRAVENOUS SOLUTION INTRAVENOUS CONTINUOUS
Status: DISCONTINUED | OUTPATIENT
Start: 2024-06-30 | End: 2024-06-30 | Stop reason: HOSPADM

## 2024-06-30 RX ORDER — IBUPROFEN 200 MG
16 TABLET ORAL
Status: DISCONTINUED | OUTPATIENT
Start: 2024-06-30 | End: 2024-07-04 | Stop reason: HOSPADM

## 2024-06-30 RX ORDER — FENTANYL CITRATE 50 UG/ML
INJECTION, SOLUTION INTRAMUSCULAR; INTRAVENOUS
Status: DISCONTINUED | OUTPATIENT
Start: 2024-06-30 | End: 2024-06-30 | Stop reason: HOSPADM

## 2024-06-30 RX ORDER — TALC
6 POWDER (GRAM) TOPICAL NIGHTLY PRN
Status: DISCONTINUED | OUTPATIENT
Start: 2024-06-30 | End: 2024-07-04 | Stop reason: HOSPADM

## 2024-06-30 RX ORDER — HEPARIN SODIUM 1000 [USP'U]/ML
INJECTION, SOLUTION INTRAVENOUS; SUBCUTANEOUS
Status: DISCONTINUED | OUTPATIENT
Start: 2024-06-30 | End: 2024-06-30 | Stop reason: HOSPADM

## 2024-06-30 RX ORDER — MORPHINE SULFATE 4 MG/ML
2 INJECTION, SOLUTION INTRAMUSCULAR; INTRAVENOUS EVERY 4 HOURS PRN
Status: DISCONTINUED | OUTPATIENT
Start: 2024-06-30 | End: 2024-07-04 | Stop reason: HOSPADM

## 2024-06-30 RX ORDER — ALUMINUM HYDROXIDE, MAGNESIUM HYDROXIDE, AND SIMETHICONE 1200; 120; 1200 MG/30ML; MG/30ML; MG/30ML
SUSPENSION ORAL
Status: DISCONTINUED | OUTPATIENT
Start: 2024-06-30 | End: 2024-06-30 | Stop reason: HOSPADM

## 2024-06-30 RX ORDER — NITROGLYCERIN 0.4 MG/1
0.4 TABLET SUBLINGUAL EVERY 5 MIN PRN
Status: DISCONTINUED | OUTPATIENT
Start: 2024-06-30 | End: 2024-07-04 | Stop reason: HOSPADM

## 2024-06-30 RX ORDER — HYDROCODONE BITARTRATE AND ACETAMINOPHEN 5; 325 MG/1; MG/1
1 TABLET ORAL EVERY 6 HOURS PRN
Status: DISCONTINUED | OUTPATIENT
Start: 2024-07-01 | End: 2024-07-04 | Stop reason: HOSPADM

## 2024-06-30 RX ORDER — ACETAMINOPHEN 325 MG/1
650 TABLET ORAL EVERY 8 HOURS PRN
Status: DISCONTINUED | OUTPATIENT
Start: 2024-06-30 | End: 2024-07-04 | Stop reason: HOSPADM

## 2024-06-30 RX ORDER — FAMOTIDINE 20 MG/1
20 TABLET, FILM COATED ORAL 2 TIMES DAILY
Status: DISCONTINUED | OUTPATIENT
Start: 2024-06-30 | End: 2024-07-04 | Stop reason: HOSPADM

## 2024-06-30 RX ORDER — SODIUM CHLORIDE 0.9 % (FLUSH) 0.9 %
10 SYRINGE (ML) INJECTION
Status: DISCONTINUED | OUTPATIENT
Start: 2024-06-30 | End: 2024-07-04 | Stop reason: HOSPADM

## 2024-06-30 RX ADMIN — MORPHINE SULFATE 4 MG: 4 INJECTION, SOLUTION INTRAMUSCULAR; INTRAVENOUS at 03:06

## 2024-06-30 RX ADMIN — FAMOTIDINE 20 MG: 20 TABLET, FILM COATED ORAL at 08:06

## 2024-06-30 RX ADMIN — HEPARIN SODIUM 18 UNITS/KG/HR: 10000 INJECTION, SOLUTION INTRAVENOUS at 01:06

## 2024-06-30 RX ADMIN — ACETAMINOPHEN 650 MG: 325 TABLET, FILM COATED ORAL at 08:06

## 2024-06-30 RX ADMIN — METOPROLOL SUCCINATE 12.5 MG: 25 TABLET, EXTENDED RELEASE ORAL at 02:06

## 2024-06-30 RX ADMIN — HYDROCODONE BITARTRATE AND ACETAMINOPHEN 1 TABLET: 5; 325 TABLET ORAL at 11:06

## 2024-06-30 RX ADMIN — TICAGRELOR 90 MG: 90 TABLET ORAL at 08:06

## 2024-06-30 RX ADMIN — ONDANSETRON 4 MG: 2 INJECTION INTRAMUSCULAR; INTRAVENOUS at 01:06

## 2024-06-30 RX ADMIN — ASPIRIN 324 MG: 81 TABLET, COATED ORAL at 01:06

## 2024-06-30 RX ADMIN — ONDANSETRON 4 MG: 2 INJECTION INTRAMUSCULAR; INTRAVENOUS at 03:06

## 2024-06-30 RX ADMIN — NITROGLYCERIN 1 INCH: 20 OINTMENT TOPICAL at 02:06

## 2024-06-30 RX ADMIN — INSULIN ASPART 1 UNITS: 100 INJECTION, SOLUTION INTRAVENOUS; SUBCUTANEOUS at 08:06

## 2024-06-30 RX ADMIN — ALUMINUM HYDROXIDE, MAGNESIUM HYDROXIDE, AND SIMETHICONE 30 ML: 200; 200; 20 SUSPENSION ORAL at 01:06

## 2024-06-30 RX ADMIN — TIROFIBAN 0.15 MCG/KG/MIN: 5 INJECTION, SOLUTION INTRAVENOUS at 12:06

## 2024-06-30 RX ADMIN — NITROGLYCERIN 0.4 MG: 0.4 TABLET SUBLINGUAL at 01:06

## 2024-06-30 RX ADMIN — HEPARIN SODIUM 12 UNITS/KG/HR: 10000 INJECTION, SOLUTION INTRAVENOUS at 03:06

## 2024-06-30 RX ADMIN — ASPIRIN 81 MG: 81 TABLET, COATED ORAL at 08:06

## 2024-06-30 NOTE — ED PROVIDER NOTES
Encounter Date: 6/30/2024    SCRIBE #1 NOTE: I, Irish Mkie, am scribing for, and in the presence of,  Gloria Hamilton MD. I have scribed the following portions of the note - Other sections scribed: HPI, ROS, PE.       History     Chief Complaint   Patient presents with    Chest Pain     Pt transferred from [place], for NSTEMI, on heparin.      Patient is a 64-year-old male with a history of DM, CAD s/p stent placement, GERD, and HTN presenting to the ED via EMS transferred from [place] for cardiology services with c/o chest pain. The pt reports chest pressure onset yesterday with associated shortness of breath, nausea, and vomiting. He states that this episode of chest pain feels like his previous heart attacks. He notes that he has been noncompliant with his medications due to recently moving.     The history is provided by the patient. No  was used.     Review of patient's allergies indicates:   Allergen Reactions    Rosuvastatin Other (See Comments)     Past Medical History:   Diagnosis Date    Chronic pain     Coronary artery disease     Diabetes mellitus     GERD (gastroesophageal reflux disease)     Hypercholesterolemia     Hypertension      Past Surgical History:   Procedure Laterality Date    APPENDECTOMY      CORONARY ANGIOPLASTY WITH STENT PLACEMENT      X4     Family History   Problem Relation Name Age of Onset    Hypertension Mother      Stroke Mother      Diabetes Mother      Hypertension Father      Heart disease Father      Diabetes Father       Social History     Tobacco Use    Smoking status: Every Day     Types: Cigarettes    Smokeless tobacco: Never   Substance Use Topics    Alcohol use: Not Currently    Drug use: Never     Review of Systems   Constitutional:  Negative for fever.   HENT:  Negative for sore throat.    Eyes:  Negative for visual disturbance.   Respiratory:  Positive for shortness of breath.    Cardiovascular:  Positive for chest pain.   Gastrointestinal:   Positive for nausea and vomiting. Negative for abdominal pain.   Genitourinary:  Negative for dysuria.   Musculoskeletal:  Negative for joint swelling.   Skin:  Negative for rash.   Neurological:  Negative for weakness.   Psychiatric/Behavioral:  Negative for confusion.    All other systems reviewed and are negative.      Physical Exam     Initial Vitals [06/30/24 0236]   BP Pulse Resp Temp SpO2   (!) 144/58 68 19 98.2 °F (36.8 °C) 98 %      MAP       --         Physical Exam    Nursing note and vitals reviewed.  Constitutional: He appears well-developed and well-nourished. He is not diaphoretic. No distress.   HENT:   Head: Normocephalic and atraumatic.   Nose: Nose normal.   Mouth/Throat: Oropharynx is clear and moist.   Eyes: Conjunctivae and EOM are normal. Pupils are equal, round, and reactive to light.   Neck: Trachea normal. Neck supple.   Normal range of motion.  Cardiovascular:  Normal rate, regular rhythm, normal heart sounds and intact distal pulses.     Exam reveals no gallop and no friction rub.       No murmur heard.  Pulmonary/Chest: Breath sounds normal. No respiratory distress. He has no wheezes. He has no rhonchi. He has no rales. He exhibits no tenderness.   Abdominal: Abdomen is soft. Bowel sounds are normal. He exhibits no distension and no mass. There is no abdominal tenderness. There is no rebound.   Musculoskeletal:         General: No tenderness or edema. Normal range of motion.      Cervical back: Normal range of motion and neck supple.      Lumbar back: Normal. No tenderness. Normal range of motion.     Neurological: He is alert and oriented to person, place, and time. He has normal strength. No cranial nerve deficit or sensory deficit.   Skin: Skin is warm and dry. Capillary refill takes less than 2 seconds. No rash and no abscess noted. No erythema. No pallor.   Psychiatric: He has a normal mood and affect. His behavior is normal. Judgment and thought content normal.         ED Course    Critical Care    Date/Time: 6/30/2024 3:06 AM    Performed by: Gloria Hamilton MD  Authorized by: Gloria Hamilton MD  Direct patient critical care time: 30 minutes  Total critical care time (exclusive of procedural time) : 30 minutes  Critical care was necessary to treat or prevent imminent or life-threatening deterioration of the following conditions: cardiac failure.  Critical care was time spent personally by me on the following activities: development of treatment plan with patient or surrogate, discussions with consultants, evaluation of patient's response to treatment, examination of patient, obtaining history from patient or surrogate, ordering and performing treatments and interventions, ordering and review of laboratory studies, pulse oximetry, re-evaluation of patient's condition and review of old charts.  Subsequent provider of critical care: I assumed direction of critical care for this patient from another provider of my specialty.        Labs Reviewed   APTT   PROTIME-INR   CBC W/ AUTO DIFFERENTIAL    Narrative:     The following orders were created for panel order CBC auto differential.  Procedure                               Abnormality         Status                     ---------                               -----------         ------                     CBC with Differential[[id number]]                                                        Please view results for these tests on the individual orders.   CBC W/ AUTO DIFFERENTIAL    Narrative:     The following orders were created for panel order CBC auto differential.  Procedure                               Abnormality         Status                     ---------                               -----------         ------                     CBC with Differential[[id number]]                                                        Please view results for these tests on the individual orders.   CBC WITH DIFFERENTIAL   CBC WITH DIFFERENTIAL    TROPONIN I          Imaging Results    None          Medications   heparin 25,000 units in dextrose 5% 250 mL (100 units/mL) infusion LOW INTENSITY nomogram - LAF (has no administration in time range)   heparin 25,000 units in dextrose 5% (100 units/ml) IV bolus from bag LOW INTENSITY nomogram - [facility] (has no administration in time range)   heparin 25,000 units in dextrose 5% (100 units/ml) IV bolus from bag LOW INTENSITY nomogram - [facility]AF (has no administration in time range)   sodium chloride 0.9% flush 10 mL (has no administration in time range)   melatonin tablet 6 mg (has no administration in time range)   acetaminophen tablet 650 mg (has no administration in time range)   morphine injection 2 mg (has no administration in time range)   morphine injection 4 mg (has no administration in time range)   famotidine tablet 20 mg (has no administration in time range)   ondansetron injection 4 mg (has no administration in time range)   nitroGLYCERIN 2% TD oint ointment 1 inch (1 inch Transdermal Given 6/30/24 0255)     Medical Decision Making  Differential diagnosis include but are not limited to: NSTEMI and CHF.  Cbc, cmp, trop, EKG ordered and reviewed  Reviewed workup from sending faciltiy  Consutled cardiology, given nitro and continued heparin, admit for nstemi    Problems Addressed:  Benign essential HTN: chronic illness or injury  Chest pain: acute illness or injury that poses a threat to life or bodily functions  NSTEMI (non-ST elevated myocardial infarction): acute illness or injury that poses a threat to life or bodily functions  Type 2 diabetes mellitus without complication, with long-term current use of insulin: chronic illness or injury with exacerbation, progression, or side effects of treatment    Amount and/or Complexity of Data Reviewed  External Data Reviewed: labs, radiology, ECG and notes.     Details: nstemi  Labs: ordered. Decision-making details documented in ED Course.  Radiology: ordered and  independent interpretation performed. Decision-making details documented in ED Course.  ECG/medicine tests: ordered and independent interpretation performed. Decision-making details documented in ED Course.    Risk  OTC drugs.  Prescription drug management.  Parenteral controlled substances.  Drug therapy requiring intensive monitoring for toxicity.  Decision regarding hospitalization.    Critical Care  Total time providing critical care: 30 minutes            Scribe Attestation:   Scribe #1: I performed the above scribed service and the documentation accurately describes the services I performed. I attest to the accuracy of the note.  Comments: Attending:   Physician Attestation Statement for Scribe #1: Gloria GARDUNO MD, personally performed the services described in this documentation. All medical record entries made by the scribe were at my direction and in my presence.  I have reviewed the chart and agree that the record reflects my personal performance and is accurate and complete.        Attending Attestation:           Physician Attestation for Scribe:  Physician Attestation Statement for Scribe #1: Gloria GARDUNO MD, reviewed documentation, as scribed by Irish Mckeon in my presence, and it is both accurate and complete.             ED Course as of 06/30/24 0308   Sun Jun 30, 2024   0249 Paged CIS.  [RB]   0255 Macario Riojas, np with cis agrees with plan [BS]   0306 EKG obtained at 2:58 a.m. rate of 63 normal sinus rhythm [BS]      ED Course User Index  [BS] Gloria Hamilton MD  [RB] Irish Mckeon                 Medical Decision Making:   History:   Old Medical Records: I decided to obtain old medical records.  Old Records Summarized: records from another hospital.       <> Summary of Records: Nstemi workup frmo sending facility, admissionfor appendicitis  Initial Assessment:   See hpi  Independently Interpreted Test(s):   I have ordered and independently interpreted EKG Reading(s) - see  prior notes  Clinical Tests:   Lab Tests: Ordered and Reviewed  Radiological Study: Reviewed  Medical Tests: Ordered and Reviewed  Other:   I have discussed this case with another health care provider.             Clinical Impression:  Final diagnoses:  [I21.4] NSTEMI (non-ST elevated myocardial infarction) (Primary)  [I10] Benign essential HTN  [R07.9] Chest pain  [E11.9, Z79.4] Type 2 diabetes mellitus without complication, with long-term current use of insulin          ED Disposition Condition    Observation Stable                Gloria Hamilton MD  06/30/24 0302

## 2024-06-30 NOTE — ED PROVIDER NOTES
Encounter Date: 6/30/2024  History from patient     History     Chief Complaint   Patient presents with    Chest Pain     Pt c/o cp x 1 week.     HPI    Jb Abbott is 64 y.o. male who  has a past medical history of Chronic pain, Coronary artery disease, Diabetes mellitus, GERD (gastroesophageal reflux disease), Hypercholesterolemia, and Hypertension. arrives in ER with c/o Chest Pain (Pt c/o cp x 1 week.)    Review of patient's allergies indicates:   Allergen Reactions    Rosuvastatin Other (See Comments)       No current outpatient medications  Patient is not taking any of his medicines although he was on insulin, beta-blockers, calcium channel blockers, ACE inhibitors, and other antidiabetic medication.  And cholesterol medicines.    Past Medical History:   Diagnosis Date    Chronic pain     Coronary artery disease     Diabetes mellitus     GERD (gastroesophageal reflux disease)     Hypercholesterolemia     Hypertension      Past Surgical History:   Procedure Laterality Date    APPENDECTOMY      CORONARY ANGIOPLASTY WITH STENT PLACEMENT      X4     Family History   Problem Relation Name Age of Onset    Hypertension Mother      Stroke Mother      Diabetes Mother      Hypertension Father      Heart disease Father      Diabetes Father       Social History     Tobacco Use    Smoking status: Every Day     Types: Cigarettes    Smokeless tobacco: Never   Substance Use Topics    Alcohol use: Not Currently    Drug use: Never     Review of Systems   Constitutional:  Negative for fever.   HENT:  Negative for trouble swallowing and voice change.    Eyes:  Negative for visual disturbance.   Respiratory:  Positive for chest tightness. Negative for cough and shortness of breath.    Cardiovascular:  Positive for chest pain.   Gastrointestinal:  Positive for nausea. Negative for abdominal pain, diarrhea and vomiting.   Genitourinary:  Negative for dysuria and hematuria.   Musculoskeletal:  Negative for back pain and  gait problem.   Skin:  Negative for color change and rash.   Neurological:  Negative for headaches.   Psychiatric/Behavioral:  Negative for behavioral problems and sleep disturbance.    All other systems reviewed and are negative.      Physical Exam     Initial Vitals [06/30/24 0035]   BP Pulse Resp Temp SpO2   (!) 165/89 71 20 98.1 °F (36.7 °C) 99 %      MAP       --         Physical Exam    Nursing note and vitals reviewed.  Constitutional: He appears well-developed and well-nourished. No distress.   HENT:   Head: Atraumatic.   Eyes: EOM are normal.   Neck: Neck supple.   Cardiovascular:  Normal rate, regular rhythm and normal heart sounds.           Pulmonary/Chest: Breath sounds normal. No respiratory distress. He has no wheezes. He has no rales.   Abdominal: Abdomen is soft. Bowel sounds are normal. He exhibits no distension and no mass. There is no abdominal tenderness. There is no rebound and no guarding.   Musculoskeletal:         General: No tenderness or edema. Normal range of motion.      Cervical back: Neck supple. No bony tenderness.     Neurological: He is alert and oriented to person, place, and time. GCS score is 15. GCS eye subscore is 4. GCS verbal subscore is 5. GCS motor subscore is 6.   Speech Normal   Skin: Skin is warm and dry.   Psychiatric: He has a normal mood and affect.   Pleasant       ED Course   Critical Care    Date/Time: 6/30/2024 1:50 AM    Performed by: Mike Grewal MD  Authorized by: Mike Grewal MD  Direct patient critical care time: 12 minutes  Additional history critical care time: 5 minutes  Ordering / reviewing critical care time: 5 minutes  Documentation critical care time: 10 minutes  Consulting other physicians critical care time: 5 minutes  Total critical care time (exclusive of procedural time) : 37 minutes  Critical care was necessary to treat or prevent imminent or life-threatening deterioration of the following conditions: cardiac failure.  Critical care  was time spent personally by me on the following activities: development of treatment plan with patient or surrogate, discussions with consultants, evaluation of patient's response to treatment, examination of patient, obtaining history from patient or surrogate, ordering and performing treatments and interventions, ordering and review of laboratory studies, ordering and review of radiographic studies, re-evaluation of patient's condition, pulse oximetry and review of old charts.        Orders Placed This Encounter   Procedures    Critical Care    X-ray Chest AP Portable    Comprehensive metabolic panel    CBC auto differential    Troponin I    Brain natriuretic peptide    CBC with Differential    APTT    Protime-INR    Drug Screen, Urine    Ethanol    Urinalysis, Reflex to Urine Culture    Urinalysis, Microscopic    Diet NPO    Vital signs    Cardiac Monitoring - Adult    Draw aPTT level 6 hours after start of infusion; adjust dosage and order aPTT based on the nomogram in hyperlink    Do not administer any intramuscular injections, call physician for an alternative route or medication if medication is needed    If bleeding occurs, or HIT is suspected, stop heparin infusion and contact physician    Oxygen Continuous    Pulse Oximetry Continuous    EKG 12-LEAD    Insert saline lock    PFC Facilitated Request     Medications   heparin 25,000 units in dextrose 5% 250 mL (100 units/mL) infusion HIGH INTENSITY nomogram - LAF (18 Units/kg/hr × 70.2 kg (Adjusted) Intravenous New Bag 6/30/24 0139)   heparin 25,000 units in dextrose 5% (100 units/ml) IV bolus from bag HIGH INTENSITY nomogram - LAF (has no administration in time range)   heparin 25,000 units in dextrose 5% (100 units/ml) IV bolus from bag HIGH INTENSITY nomogram - LAF (has no administration in time range)   aluminum-magnesium hydroxide-simethicone 200-200-20 mg/5 mL suspension 30 mL (30 mLs Oral Given 6/30/24 0105)   ondansetron injection 4 mg (4 mg  Intravenous Given 6/30/24 0105)   heparin 25,000 units in dextrose 5% (100 units/ml) IV bolus from bag HIGH INTENSITY nomogram - LAF (5,616 Units Intravenous Bolus from Bag 6/30/24 0140)   nitroGLYCERIN SL tablet 0.4 mg (0.4 mg Sublingual Given 6/30/24 0134)   aspirin EC tablet 324 mg (324 mg Oral Given 6/30/24 0142)     Admission on 06/30/2024   Component Date Value Ref Range Status    Sodium 06/30/2024 137  136 - 145 mmol/L Final    Potassium 06/30/2024 4.1  3.5 - 5.1 mmol/L Final    Chloride 06/30/2024 102  98 - 107 mmol/L Final    CO2 06/30/2024 26  23 - 31 mmol/L Final    Glucose 06/30/2024 221 (H)  82 - 115 mg/dL Final    Blood Urea Nitrogen 06/30/2024 20.0  8.4 - 25.7 mg/dL Final    Creatinine 06/30/2024 0.89  0.73 - 1.18 mg/dL Final    Calcium 06/30/2024 9.5  8.8 - 10.0 mg/dL Final    Protein Total 06/30/2024 8.0 (H)  5.8 - 7.6 gm/dL Final    Albumin 06/30/2024 4.2  3.4 - 4.8 g/dL Final    Globulin 06/30/2024 3.8 (H)  2.4 - 3.5 gm/dL Final    Albumin/Globulin Ratio 06/30/2024 1.1  1.1 - 2.0 ratio Final    Bilirubin Total 06/30/2024 0.4  <=1.5 mg/dL Final    ALP 06/30/2024 86  40 - 150 unit/L Final    ALT 06/30/2024 39  0 - 55 unit/L Final    AST 06/30/2024 63 (H)  5 - 34 unit/L Final    eGFR 06/30/2024 >60  mL/min/1.73/m2 Final    Anion Gap 06/30/2024 9.0  mEq/L Final    BUN/Creatinine Ratio 06/30/2024 22   Final    Troponin-I 06/30/2024 12.324 (H)  0.000 - 0.045 ng/mL Final    Natriuretic Peptide 06/30/2024 92.1  <=100.0 pg/mL Final    WBC 06/30/2024 14.36 (H)  4.50 - 11.50 x10(3)/mcL Final    RBC 06/30/2024 5.34  4.70 - 6.10 x10(6)/mcL Final    Hgb 06/30/2024 14.8  14.0 - 18.0 g/dL Final    Hct 06/30/2024 43.8  42.0 - 52.0 % Final    MCV 06/30/2024 82.0  80.0 - 94.0 fL Final    MCH 06/30/2024 27.7  27.0 - 31.0 pg Final    MCHC 06/30/2024 33.8  33.0 - 36.0 g/dL Final    RDW 06/30/2024 15.4  11.5 - 17.0 % Final    Platelet 06/30/2024 237  130 - 400 x10(3)/mcL Final    MPV 06/30/2024 10.7 (H)  7.4 - 10.4 fL  Final    Neut % 06/30/2024 80.3  % Final    Lymph % 06/30/2024 12.2  % Final    Mono % 06/30/2024 5.9  % Final    Eos % 06/30/2024 0.7  % Final    Basophil % 06/30/2024 0.6  % Final    Lymph # 06/30/2024 1.75  0.6 - 4.6 x10(3)/mcL Final    Neut # 06/30/2024 11.52 (H)  2.1 - 9.2 x10(3)/mcL Final    Mono # 06/30/2024 0.85  0.1 - 1.3 x10(3)/mcL Final    Eos # 06/30/2024 0.10  0 - 0.9 x10(3)/mcL Final    Baso # 06/30/2024 0.09  <=0.2 x10(3)/mcL Final    IG# 06/30/2024 0.05 (H)  0 - 0.04 x10(3)/mcL Final    IG% 06/30/2024 0.3  % Final    PTT 06/30/2024 27.7  24.6 - 37.2 seconds Final    PT 06/30/2024 13.2  12.5 - 14.5 seconds Final    INR 06/30/2024 1.0  <=1.3 Final    Amphetamines, Urine 06/30/2024 Negative  Negative Final    Barbiturates, Urine 06/30/2024 Negative  Negative Final    Benzodiazepine, Urine 06/30/2024 Negative  Negative Final    Cannabinoids, Urine 06/30/2024 Negative  Negative Final    Cocaine, Urine 06/30/2024 Negative  Negative Final    Fentanyl, Urine 06/30/2024 Negative  Negative Final    MDMA, Urine 06/30/2024 Negative  Negative Final    Opiates, Urine 06/30/2024 Negative  Negative Final    Phencyclidine, Urine 06/30/2024 Negative  Negative Final    pH, Urine 06/30/2024 6.0  3.0 - 11.0 Final    Specific Gravity, Urine Auto 06/30/2024 >=1.030  1.001 - 1.035 Final    Ethanol Level 06/30/2024 <10.0  <=10.0 mg/dL Final    Color, UA 06/30/2024 Yellow  Yellow, Light-Yellow, Dark Yellow, Julia, Straw Final    Appearance, UA 06/30/2024 Cloudy (A)  Clear Final    Specific Gravity, UA 06/30/2024 >=1.030  1.005 - 1.030 Final    pH, UA 06/30/2024 6.0  5.0 - 8.5 Final    Protein, UA 06/30/2024 2+ (A)  Negative Final    Glucose, UA 06/30/2024 1+ (A)  Negative, Normal Final    Ketones, UA 06/30/2024 Negative  Negative Final    Blood, UA 06/30/2024 3+ (A)  Negative Final    Bilirubin, UA 06/30/2024 Negative  Negative Final    Urobilinogen, UA 06/30/2024 0.2  0.2, 1.0, Normal Final    Nitrites, UA 06/30/2024  Negative  Negative Final    Leukocyte Esterase, UA 06/30/2024 Negative  Negative Final    Bacteria, UA 06/30/2024 None Seen  None Seen, Rare, Occasional /HPF Final    Mucous, UA 06/30/2024 Small (A)  None Seen /LPF Final    RBC, UA 06/30/2024 >100 (A)  None Seen, 0-2, 3-5, 0-5 /HPF Final    WBC, UA 06/30/2024 None Seen  None Seen, 0-2, 3-5, 0-5 /HPF Final    Squamous Epithelial Cells, UA 06/30/2024 None Seen  None Seen, Rare, Occasional, Occ /HPF Final       Labs Reviewed   COMPREHENSIVE METABOLIC PANEL - Abnormal; Notable for the following components:       Result Value    Glucose 221 (*)     Protein Total 8.0 (*)     Globulin 3.8 (*)     AST 63 (*)     All other components within normal limits   TROPONIN I - Abnormal; Notable for the following components:    Troponin-I 12.324 (*)     All other components within normal limits   CBC WITH DIFFERENTIAL - Abnormal; Notable for the following components:    WBC 14.36 (*)     MPV 10.7 (*)     Neut # 11.52 (*)     IG# 0.05 (*)     All other components within normal limits   URINALYSIS, REFLEX TO URINE CULTURE - Abnormal; Notable for the following components:    Appearance, UA Cloudy (*)     Protein, UA 2+ (*)     Glucose, UA 1+ (*)     Blood, UA 3+ (*)     All other components within normal limits   URINALYSIS, MICROSCOPIC - Abnormal; Notable for the following components:    Mucous, UA Small (*)     RBC, UA >100 (*)     All other components within normal limits   B-TYPE NATRIURETIC PEPTIDE - Normal   APTT - Normal   PROTIME-INR - Normal   DRUG SCREEN, URINE (BEAKER) - Normal    Narrative:     Cut off concentrations:    Amphetamines - 1000 ng/ml  Barbiturates - 200 ng/ml  Benzodiazepine - 200 ng/ml  Cannabinoids (THC) - 50 ng/ml  Cocaine - 300 ng/ml  Fentanyl - 1.0 ng/ml  MDMA - 500 ng/ml  Opiates - 300 ng/ml   Phencyclidine (PCP) - 25 ng/ml    Specimen submitted for drug analysis and tested for pH and specific gravity in order to evaluate sample integrity. Suspect  tampering if specific gravity is <1.003 and/or pH is not within the range of 4.5 - 8.0  False negatives may result form substances such as bleach added to urine.  False positives may result for the presence of a substance with similar chemical structure to the drug or its metabolite.    This test provides only a PRELIMINARY analytical test result. A more specific alternate chemical method must be used in order to obtain a confirmed analytical result. Gas chromatography/mass spectrometry (GC/MS) is the preferred confirmatory method. Other chemical confirmation methods are available. Clinical consideration and professional judgement should be applied to any drug of abuse test result, particularly when preliminary positive results are used.    Positive results will be confirmed only at the physicians request. Unconfirmed screening results are to be used only for medical purposes (treatment).        ALCOHOL,MEDICAL (ETHANOL) - Normal   CBC W/ AUTO DIFFERENTIAL    Narrative:     The following orders were created for panel order CBC auto differential.  Procedure                               Abnormality         Status                     ---------                               -----------         ------                     CBC with Differential[[id number]]       Abnormal            Final result                 Please view results for these tests on the individual orders.        ECG Results              EKG 12-LEAD (Preliminary result)  Result time 06/30/24 00:50:03      Wet Read by Mike Grewal MD (06/30/24 00:50:03, [facility] - Emergency Dept, Emergency Medicine)    EKG Initial Reading: Independently Interpreted by Mike Grewal MD. independently as: No STEMI. Rhythm: Normal Sinus Rhythm, Rate 67. Ectopy: No Ectopy. Conduction: Normal. ST Segments: Normal ST Segments. T Waves:  Nonspecific T-wave changes. Axis: Normal.                                   Imaging Results              X-ray Chest AP  Portable (Preliminary result)  Result time 06/30/24 01:26:08      Wet Read by Mike Grewal MD (06/30/24 01:26:08, [facility] - Emergency Dept, Emergency Medicine)    Chest One View:  Independently reviewed and/or interpreted by Mike Grewal MD.  No Focal Consolidation, No Acute Cardiopulmonary abnormality identified grossly.                                     Medications   heparin 25,000 units in dextrose 5% 250 mL (100 units/mL) infusion HIGH INTENSITY nomogram - LAF (18 Units/kg/hr × 70.2 kg (Adjusted) Intravenous New Bag 6/30/24 0139)   heparin 25,000 units in dextrose 5% (100 units/ml) IV bolus from bag HIGH INTENSITY nomogram - LAF (has no administration in time range)   heparin 25,000 units in dextrose 5% (100 units/ml) IV bolus from bag HIGH INTENSITY nomogram - LAF (has no administration in time range)   aluminum-magnesium hydroxide-simethicone 200-200-20 mg/5 mL suspension 30 mL (30 mLs Oral Given 6/30/24 0105)   ondansetron injection 4 mg (4 mg Intravenous Given 6/30/24 0105)   heparin 25,000 units in dextrose 5% (100 units/ml) IV bolus from bag HIGH INTENSITY nomogram - LAF (5,616 Units Intravenous Bolus from Bag 6/30/24 0140)   nitroGLYCERIN SL tablet 0.4 mg (0.4 mg Sublingual Given 6/30/24 0134)   aspirin EC tablet 324 mg (324 mg Oral Given 6/30/24 0142)     Medical Decision Making    Jb Abbott is 64 y.o. male who  has a past medical history of Chronic pain, Coronary artery disease, Diabetes mellitus, GERD (gastroesophageal reflux disease), Hypercholesterolemia, and Hypertension. arrives in ER with c/o Chest Pain (Pt c/o cp x 1 week.)    Patient comes to the emergency room with complaint of chest pain which has been going on for a week, he says also has some chronic pains in legs and arms and back, he took 8 tablets of ibuprofen this morning for pain, later on started having nausea, in the pain is not getting better even with nitroglycerin and he feels like he is  having chest pain like he had when he had to have for stent done on him, so decided to come to the emergency room.  Patient has recently moved from [place] and he has stopped taking all his medicines about 6 weeks back, and he says his diabetes is more in control then it was when he was taking all the medicines.     Denies any other complaints except for nausea, which started today but the chest pain has been going on for a week.    EKG on arrival shows some nonspecific T-wave changes otherwise no other acute abnormality.  I will do a cardiac panel on him, I will give him a dose of Mylanta and I have advised him that he should not take 8 tablets of anything at 1 time for anything.    Amount and/or Complexity of Data Reviewed  Labs: ordered. Decision-making details documented in ED Course.  Radiology: ordered and independent interpretation performed.    Risk  OTC drugs.  Prescription drug management.               ED Course as of 06/30/24 0202   Sun Jun 30, 2024   0129 Troponin I(!): 12.324  Patient's troponin is 12.3, BNP is normal, chest x-ray is normal, EKG does not show any acute changes, he does have some nonspecific T-wave changes, with that troponin patient essentially having non STEMI, I will transfer him to a place where a cardiologist can see him and possibly do a cardiac catheterization on him and further workup. [GQ]   0148 Dr. Ibrahim accepted pt for transfer to [facility] ER.  I am going to transfer patient by ambulance over to [place] for admission. [GQ]   0149 BP(!): 146/75 [GQ]   0149 Pulse: 65 [GQ]   0149 Resp: 19 [GQ]   0149 SpO2: 98 %  Patient is comfortable, he actually took himself off the monitor and wanted to go to the toilet but I advised him to stay in the bed and gave him a urinal. [GQ]      ED Course User Index  [GQ] Mike Grewal MD                           Clinical Impression:  Final diagnoses:  [R07.9] Chest pain  [I21.4] NSTEMI (non-ST elevated myocardial infarction) (Primary)  [I10]  Hypertension, unspecified type  [Z91.199] Noncompliance  [E11.65] Uncontrolled type 2 diabetes mellitus with hyperglycemia          ED Disposition Condition    Transfer to Another Facility Stable                Mike Grewal MD  06/30/24 0209

## 2024-06-30 NOTE — BRIEF OP NOTE
[facility] - Cath Lab Services  Brief Operative Note    SUMMARY     Surgery Date: 6/30/2024     Surgeons and Role:     * Macario Mishra Jr., MD - Primary    Assisting Surgeon: None    Pre-op Diagnosis:  NSTEMI (non-ST elevated myocardial infarction) [I21.4]    Post-op Diagnosis:  Post-Op Diagnosis Codes:     * NSTEMI (non-ST elevated myocardial infarction) [I21.4]    Procedure(s) (LRB):  Left heart cath (N/A)    Anesthesia: RN IV Sedation    Implants:  Implant Name Type Inv. Item Serial No.  Lot No. LRB No. Used Action   STENT SYNERGY XD [id number] - [id number] stent coronary DAVID - Balloon Exp STENT SYNERGY XD 3.5X32MM  [employer] [id number] Right 1 Implanted   STENT SYNERGY XD 4.0X38MM - [id number] stent coronary DAVID - Balloon Exp STENT SYNERGY XD 4.0X38MM  [employer] [id number] Right 1 Implanted       Operative Findings: 100% RCA ISR; status post DESx2 with excellent result    Estimated Blood Loss: * No values recorded between 6/30/2024 10:25 AM and 6/30/2024 11:13 AM *    Estimated Blood Loss has been documented.         Specimens:   Specimen (24h ago, onward)      None            [id number]

## 2024-06-30 NOTE — Clinical Note
The catheter was removed from the and was repositioned into the ostium   right coronary artery. The catheter was unable to engage the area..

## 2024-06-30 NOTE — Clinical Note
Diagnosis: NSTEMI (non-ST elevated myocardial infarction) [[id number]]   Future Attending Provider: MAHAMED LIZARRAGA [[id number]]   Admit to which facility:: [facility] [[id number]]

## 2024-06-30 NOTE — H&P
" [facility] - 6th Floor Medical Telemetry    Cardiology  History and Physical     Patient Name: Jb Abbott  MRN: [id number]  Admission Date: 6/30/2024  Code Status: Full Code   Attending Provider: Tunde Cole MD   Primary Care Physician: Greer, Primary Doctor  Principal Problem:<principal problem not specified>    Patient information was obtained from patient, past medical records, ER records, and primary team.     Subjective:   Chief Complaint:  Chest Pain    HPI:   Mr. Abbott is a 64 year old male, unknown to [facility] ([facility] in [place]), who presented to the hospital as transfer from [place] via EMS. Transferred to [facility] for cardiac care in the setting of NSTEMI. Presented to outlying facility with CP. Described as "pressure" with associated SOB. Also reported nausea. Reported prior history of CAD and prior stents. Patient loaded with Aspirin and started on Heparin Infusion for ACS Treatment. He is admitted to [facility] for NSTEMI Treatment.     PMH: Chronic Pain, CAD, DM, GERD, Hyperlipidemia, Hypertension  PSH: Appendectomy, LHC/PCI  Family History: Father- Hypertension/Heart Disease/DM, Mother- Hypertension/Stroke/DM  Social History: Tobacco- Active Smoker, Alcohol- Negative, Substance abuse- Negative    Previous Cardiac Diagnostics:   No Cardiac Diagnostics on File     Review of patient's allergies indicates:   Allergen Reactions    Rosuvastatin Other (See Comments)       Current Facility-Administered Medications on File Prior to Encounter   Medication    [COMPLETED] aluminum-magnesium hydroxide-simethicone 200-200-20 mg/5 mL suspension 30 mL    [COMPLETED] aspirin EC tablet 324 mg    [COMPLETED] heparin 25,000 units in dextrose 5% (100 units/ml) IV bolus from bag HIGH INTENSITY nomogram - [facility]    [COMPLETED] nitroGLYCERIN SL tablet 0.4 mg    [COMPLETED] ondansetron injection 4 mg    [DISCONTINUED] heparin 25,000 units in dextrose 5% (100 units/ml) IV bolus from bag HIGH INTENSITY " nomogram - [facility]    [DISCONTINUED] heparin 25,000 units in dextrose 5% (100 units/ml) IV bolus from bag HIGH INTENSITY nomogram - [facility]    [DISCONTINUED] heparin 25,000 units in dextrose 5% 250 mL (100 units/mL) infusion HIGH INTENSITY nomogram - [facility]     No current outpatient medications on file prior to encounter.     Review of Systems   Cardiovascular:  Positive for chest pain.   Respiratory:  Positive for shortness of breath.    All other systems reviewed and are negative.    Objective:     Vital Signs (Most Recent):  Temp: 97.6 °F (36.4 °C) (06/30/24 0427)  Pulse: 69 (06/30/24 0447)  Resp: 18 (06/30/24 0427)  BP: 136/73 (06/30/24 0427)  SpO2: 98 % (06/30/24 0427) Vital Signs (24h Range):  Temp:  [97.6 °F (36.4 °C)-98.2 °F (36.8 °C)] 97.6 °F (36.4 °C)  Pulse:  [64-71] 69  Resp:  [16-25] 18  SpO2:  [95 %-99 %] 98 %  BP: (115-165)/(58-89) 136/73     Weight: 76.2 kg (168 lb)  Body mass index is 26.31 kg/m².    SpO2: 98 %       No intake or output data in the 24 hours ending 06/30/24 0814    Lines/Drains/Airways       Peripheral Intravenous Line  Duration                  Peripheral IV - Single Lumen 18 G Posterior;Right Forearm -- days                    Physical Exam  Vitals and nursing note reviewed.   Constitutional:       Appearance: Normal appearance.   HENT:      Head: Normocephalic.      Mouth/Throat:      Mouth: Mucous membranes are moist.      Pharynx: Oropharynx is clear.   Cardiovascular:      Rate and Rhythm: Normal rate and regular rhythm.      Heart sounds: Normal heart sounds.   Pulmonary:      Effort: Pulmonary effort is normal. No respiratory distress.      Breath sounds: Normal breath sounds. No wheezing or rales.   Abdominal:      General: There is no distension.      Palpations: Abdomen is soft.      Tenderness: There is no abdominal tenderness. There is no guarding.   Musculoskeletal:         General: Normal range of motion.      Cervical back: Neck supple.   Skin:     General: Skin is warm and dry.  "  Neurological:      General: No focal deficit present.      Mental Status: He is alert and oriented to person, place, and time. Mental status is at baseline.   Psychiatric:         Mood and Affect: Mood normal.         Behavior: Behavior normal.         Thought Content: Thought content normal.         Judgment: Judgment normal.         EKG:       Telemetry: Sinus Rhythm     Significant Labs: BMP:   Recent Labs   Lab 06/30/24  0130      K 4.1      CO2 26   BUN 20.0   CREATININE 0.89   CALCIUM 9.5   , CMP   Recent Labs   Lab 06/30/24  0130      K 4.1      CO2 26   BUN 20.0   CREATININE 0.89   CALCIUM 9.5   ALBUMIN 4.2   BILITOT 0.4   ALKPHOS 86   AST 63*   ALT 39   , CBC   Recent Labs   Lab 06/30/24  0050 06/30/24  0545   WBC 14.36* 13.10*   HGB 14.8 13.9*   HCT 43.8 41.6*    193   , INR   Recent Labs   Lab 06/30/24  0131 06/30/24  0545   INR 1.0 1.1   , Lipid Panel No results for input(s): "CHOL", "HDL", "LDLCALC", "TRIG", "CHOLHDL" in the last 48 hours., Troponin   Recent Labs   Lab 06/30/24  0050 06/30/24  0545   TROPONINI 12.324* 16.547*   , and All pertinent lab results from the last 24 hours have been reviewed.    Significant Imaging:   Chest XR (6.30.24):  FINDINGS:  Cardiac silhouette is normal size.  Calcifications aortic knob are noted.  Central vessels are within normal limits.  No confluent airspace disease.  No visible pneumothorax or pleural effusion.  No acute osseous abnormality  Impression:  No acute cardiopulmonary process.    Assessment and Plan:   Impression:  NSTEMI- Suspect Type I  CAD    - Status Post PCI (Details Unclear)  Hypertension  Hyperlipidemia    - Rosuvastatin Allergy  Leukocytosis    - Afebrile   Anemia (Stable)  DM II  Chronic Pain  No known History of GI Bleed    Plan:  Aspirin Load Given- Start Aspirin 81 Mg Daily this AM  Continue Heparin Infusion Per ACS Protocol  Check Echocardiogram Re: NSTEMI  Trend Troponin Values  Keep NPO  Plan C +/- PCI " Today. Risks/Benefits/Alternatives of the Cath discussed with the patient. He elects to proceed.  Consent signed and on chart.    VTE Risk Mitigation (From admission, onward)           Ordered     heparin 25,000 units in dextrose 5% (100 units/ml) IV bolus from bag LOW INTENSITY nomogram - [facility]  As needed (PRN)        Question:  Heparin Infusion Adjustment (DO NOT MODIFY ANSWER)  Answer:  [email or web address]\Images\Pharmacy\HeparinInfusions\heparin LOW INTENSITY nomogram for [facility] [id number]    06/30/24 0249     heparin 25,000 units in dextrose 5% (100 units/ml) IV bolus from bag LOW INTENSITY nomogram - [facility]  As needed (PRN)        Question:  Heparin Infusion Adjustment (DO NOT MODIFY ANSWER)  Answer:  [email or web address]\epic\Images\Pharmacy\HeparinInfusions\heparin LOW INTENSITY nomogram for [facility] [id number]    06/30/24 0249     IP VTE LOW RISK PATIENT  Once         06/30/24 0304     Place sequential compression device  Until discontinued         06/30/24 0304     heparin 25,000 units in dextrose 5% 250 mL (100 units/mL) infusion LOW INTENSITY nomogram - [facility]  Continuous        Question:  Begin at (units/kg/hr)  Answer:  12    06/30/24 0249                  Venkatesh Bolaños, MARIA A  Cardiology  [facility] - 6th Floor Medical Telemetry  06/30/2024 8:14 AM           --------------   CARDIOLOGY ATTENDING ADDENDUM   ---------------      Cardiology Attending  I evaluated Jb Abbott.  The patient is a 64 y.o. male with:   Chief Complaint   Patient presents with    Chest Pain     Pt transferred from [place], for NSTEMI, on heparin.          ROS    GEN   No fever  + weakness    RESP  + SOB  No wheezing  SKIN  + pruritus  No rash  CARD  + CP  No palpitations        VASC  No cyanosis  +/- claudication  HEM   No adenopathy  No bleeding  GI  +/- heart burn  No melena  NEURO  No dizziness  No syncope         Current Facility-Administered Medications:     acetaminophen tablet 650 mg, 650 mg, Oral, Q8H PRN, Joy  "Gloria JAUREGUI MD    aspirin EC tablet 81 mg, 81 mg, Oral, Daily, Venkatesh Bolaños, FNP, 81 mg at 06/30/24 0841    famotidine tablet 20 mg, 20 mg, Oral, BID, Gloria Hamilton MD, 20 mg at 06/30/24 0841    heparin 25,000 units in dextrose 5% (100 units/ml) IV bolus from bag LOW INTENSITY nomogram - [facility], 57.1 Units/kg (Adjusted), Intravenous, PRN, Gloria Hamilton MD, 4,000 Units at 06/30/24 0843    heparin 25,000 units in dextrose 5% (100 units/ml) IV bolus from bag LOW INTENSITY nomogram - [facility], 30 Units/kg (Adjusted), Intravenous, PRN, Gloria Hamilton MD    heparin 25,000 units in dextrose 5% 250 mL (100 units/mL) infusion LOW INTENSITY nomogram - [facility], 0-40 Units/kg/hr (Adjusted), Intravenous, Continuous, Gloria Hamilton MD, Last Rate: 10.5 mL/hr at 06/30/24 0850, 15 Units/kg/hr at 06/30/24 0850    melatonin tablet 6 mg, 6 mg, Oral, Nightly PRN, Gloria Hamilton MD    morphine injection 2 mg, 2 mg, Intravenous, Q4H PRN, Gloria Hamilton MD    morphine injection 4 mg, 4 mg, Intravenous, Q4H PRN, Gloria Hamilton MD, 4 mg at 06/30/24 0320    ondansetron injection 4 mg, 4 mg, Intravenous, Q8H PRN, Gloria Hamilton MD, 4 mg at 06/30/24 0320    sodium chloride 0.9% flush 10 mL, 10 mL, Intravenous, PRN, Gloria Hamilton MD    Blood pressure (!) 144/74, pulse 64, temperature 98 °F (36.7 °C), temperature source Oral, resp. rate 18, height 5' 6.93" (1.7 m), weight 76 kg (167 lb 8.8 oz), SpO2 97%.  PE    GEN  No acute distress  Not ill appearing  NECK  Normal carotid upstroke and volume  Supple  CV  Normal rate  Regular rhythm  No murmur  PUL  No respiratory distress  No wheezing  No crackles  ABD  No distention  No tenderness  LOW EXT  No deformity  No edema  SKIN  No bruising  No rash  NEURO  Awake and alert    No severe weakness in upper or lower extremities.       Labs  Last BMP BMP  Lab Results   Component Value Date     06/30/2024    K 4.1 06/30/2024     06/30/2024    CO2 26 06/30/2024 " "   BUN 20.0 06/30/2024    CREATININE 0.89 06/30/2024    CALCIUM 9.5 06/30/2024    EGFRNORACEVR >60 06/30/2024      Last CBC     Lab Results   Component Value Date    WBC 13.10 (H) 06/30/2024    HGB 13.9 (L) 06/30/2024    HCT 41.6 (L) 06/30/2024    MCV 83.5 06/30/2024     06/30/2024           BNP    Lab Results   Component Value Date    BNP 92.1 06/30/2024     Troponin   Lab Results   Component Value Date    TROPONINI 16.547 (H) 06/30/2024    TROPONINI 12.324 (H) 06/30/2024     Last lipids  No results found for: "CHOL", "HDL", "LDL", "TRIG", "TOTALCHOLEST"     Echo  No results found for this or any previous visit.    Stress test  No results found for this or any previous visit.    Coronary Angiogram  No results found for this or any previous visit.    Holter Monitor  No cardiac monitor results found for the past 12 months    Assessment/Plan  I agree with the assessment and plan as outlined above  [facility] planned for this morning  Consider long term use of DAPT      Macario Holbrook MD  06/30/2024  9:32 AM  Cardiologist   "

## 2024-07-01 LAB
ALBUMIN SERPL-MCNC: 3.4 G/DL (ref 3.4–4.8)
ALBUMIN/GLOB SERPL: 1.3 RATIO (ref 1.1–2)
ALP SERPL-CCNC: 81 UNIT/L (ref 40–150)
ALT SERPL-CCNC: 40 UNIT/L (ref 0–55)
ANION GAP SERPL CALC-SCNC: 5 MEQ/L
AST SERPL-CCNC: 35 UNIT/L (ref 5–34)
BASOPHILS # BLD AUTO: 0.05 X10(3)/MCL
BASOPHILS NFR BLD AUTO: 0.6 %
BILIRUB SERPL-MCNC: 0.4 MG/DL
BUN SERPL-MCNC: 25.7 MG/DL (ref 8.4–25.7)
CALCIUM SERPL-MCNC: 9.3 MG/DL (ref 8.8–10)
CHLORIDE SERPL-SCNC: 102 MMOL/L (ref 98–107)
CO2 SERPL-SCNC: 28 MMOL/L (ref 23–31)
CREAT SERPL-MCNC: 1.05 MG/DL (ref 0.73–1.18)
CREAT/UREA NIT SERPL: 24
EOSINOPHIL # BLD AUTO: 0.1 X10(3)/MCL (ref 0–0.9)
EOSINOPHIL NFR BLD AUTO: 1.2 %
ERYTHROCYTE [DISTWIDTH] IN BLOOD BY AUTOMATED COUNT: 15.5 % (ref 11.5–17)
GFR SERPLBLD CREATININE-BSD FMLA CKD-EPI: >60 ML/MIN/1.73/M2
GLOBULIN SER-MCNC: 2.7 GM/DL (ref 2.4–3.5)
GLUCOSE SERPL-MCNC: 221 MG/DL (ref 82–115)
HCT VFR BLD AUTO: 38.1 % (ref 42–52)
HGB BLD-MCNC: 12.6 G/DL (ref 14–18)
IMM GRANULOCYTES # BLD AUTO: 0.03 X10(3)/MCL (ref 0–0.04)
IMM GRANULOCYTES NFR BLD AUTO: 0.4 %
LYMPHOCYTES # BLD AUTO: 2.1 X10(3)/MCL (ref 0.6–4.6)
LYMPHOCYTES NFR BLD AUTO: 25.4 %
MAGNESIUM SERPL-MCNC: 2.1 MG/DL (ref 1.6–2.6)
MCH RBC QN AUTO: 27.9 PG (ref 27–31)
MCHC RBC AUTO-ENTMCNC: 33.1 G/DL (ref 33–36)
MCV RBC AUTO: 84.5 FL (ref 80–94)
MONOCYTES # BLD AUTO: 0.79 X10(3)/MCL (ref 0.1–1.3)
MONOCYTES NFR BLD AUTO: 9.6 %
NEUTROPHILS # BLD AUTO: 5.19 X10(3)/MCL (ref 2.1–9.2)
NEUTROPHILS NFR BLD AUTO: 62.8 %
NRBC BLD AUTO-RTO: 0 %
OHS QRS DURATION: 86 MS
OHS QTC CALCULATION: 418 MS
PLATELET # BLD AUTO: 172 X10(3)/MCL (ref 130–400)
PMV BLD AUTO: 10.9 FL (ref 7.4–10.4)
POCT GLUCOSE: 204 MG/DL (ref 70–110)
POCT GLUCOSE: 212 MG/DL (ref 70–110)
POTASSIUM SERPL-SCNC: 4.5 MMOL/L (ref 3.5–5.1)
PROT SERPL-MCNC: 6.1 GM/DL (ref 5.8–7.6)
RBC # BLD AUTO: 4.51 X10(6)/MCL (ref 4.7–6.1)
SODIUM SERPL-SCNC: 135 MMOL/L (ref 136–145)
TROPONIN I SERPL-MCNC: 11.21 NG/ML (ref 0–0.04)
WBC # BLD AUTO: 8.26 X10(3)/MCL (ref 4.5–11.5)

## 2024-07-01 PROCEDURE — 80053 COMPREHEN METABOLIC PANEL: CPT | Performed by: NURSE PRACTITIONER

## 2024-07-01 PROCEDURE — 93005 ELECTROCARDIOGRAM TRACING: CPT

## 2024-07-01 PROCEDURE — 25000003 PHARM REV CODE 250: Performed by: NURSE PRACTITIONER

## 2024-07-01 PROCEDURE — 85025 COMPLETE CBC W/AUTO DIFF WBC: CPT | Performed by: NURSE PRACTITIONER

## 2024-07-01 PROCEDURE — 36415 COLL VENOUS BLD VENIPUNCTURE: CPT | Mod: 91 | Performed by: NURSE PRACTITIONER

## 2024-07-01 PROCEDURE — 84484 ASSAY OF TROPONIN QUANT: CPT | Performed by: EMERGENCY MEDICINE

## 2024-07-01 PROCEDURE — 93010 ELECTROCARDIOGRAM REPORT: CPT | Mod: ,,, | Performed by: INTERNAL MEDICINE

## 2024-07-01 PROCEDURE — 36415 COLL VENOUS BLD VENIPUNCTURE: CPT | Performed by: EMERGENCY MEDICINE

## 2024-07-01 PROCEDURE — 83735 ASSAY OF MAGNESIUM: CPT | Performed by: NURSE PRACTITIONER

## 2024-07-01 PROCEDURE — 63600175 PHARM REV CODE 636 W HCPCS: Performed by: NURSE PRACTITIONER

## 2024-07-01 PROCEDURE — 25000003 PHARM REV CODE 250: Performed by: EMERGENCY MEDICINE

## 2024-07-01 PROCEDURE — 96372 THER/PROPH/DIAG INJ SC/IM: CPT | Performed by: NURSE PRACTITIONER

## 2024-07-01 PROCEDURE — 21400001 HC TELEMETRY ROOM

## 2024-07-01 PROCEDURE — 25000003 PHARM REV CODE 250: Performed by: STUDENT IN AN ORGANIZED HEALTH CARE EDUCATION/TRAINING PROGRAM

## 2024-07-01 RX ORDER — EZETIMIBE 10 MG/1
10 TABLET ORAL NIGHTLY
Status: DISCONTINUED | OUTPATIENT
Start: 2024-07-01 | End: 2024-07-04 | Stop reason: HOSPADM

## 2024-07-01 RX ADMIN — INSULIN ASPART 2 UNITS: 100 INJECTION, SOLUTION INTRAVENOUS; SUBCUTANEOUS at 08:07

## 2024-07-01 RX ADMIN — FAMOTIDINE 20 MG: 20 TABLET, FILM COATED ORAL at 08:07

## 2024-07-01 RX ADMIN — TICAGRELOR 90 MG: 90 TABLET ORAL at 09:07

## 2024-07-01 RX ADMIN — ASPIRIN 81 MG: 81 TABLET, COATED ORAL at 09:07

## 2024-07-01 RX ADMIN — VALSARTAN 40 MG: 40 TABLET, FILM COATED ORAL at 09:07

## 2024-07-01 RX ADMIN — FAMOTIDINE 20 MG: 20 TABLET, FILM COATED ORAL at 09:07

## 2024-07-01 RX ADMIN — HYDROCODONE BITARTRATE AND ACETAMINOPHEN 1 TABLET: 5; 325 TABLET ORAL at 05:07

## 2024-07-01 RX ADMIN — INSULIN ASPART 2 UNITS: 100 INJECTION, SOLUTION INTRAVENOUS; SUBCUTANEOUS at 05:07

## 2024-07-01 RX ADMIN — HYDROCODONE BITARTRATE AND ACETAMINOPHEN 1 TABLET: 5; 325 TABLET ORAL at 03:07

## 2024-07-01 RX ADMIN — HYDROCODONE BITARTRATE AND ACETAMINOPHEN 1 TABLET: 5; 325 TABLET ORAL at 08:07

## 2024-07-01 RX ADMIN — METOPROLOL SUCCINATE 12.5 MG: 25 TABLET, EXTENDED RELEASE ORAL at 09:07

## 2024-07-01 RX ADMIN — EZETIMIBE 10 MG: 10 TABLET ORAL at 08:07

## 2024-07-01 RX ADMIN — TICAGRELOR 90 MG: 90 TABLET ORAL at 08:07

## 2024-07-01 NOTE — NURSING
Nurses Note -- 4 Eyes      7/1/2024   11:46 AM      Skin assessed during: Admit      [x] No Altered Skin Integrity Present    []Prevention Measures Documented      [] Yes- Altered Skin Integrity Present or Discovered   [] LDA Added if Not in Epic (Describe Wound)   [] New Altered Skin Integrity was Present on Admit and Documented in LDA   [] Wound Image Taken    Wound Care Consulted? No    Attending Nurse:  Bharati Dempsey RN/Staff Member:  Melany           catheter removed.

## 2024-07-01 NOTE — PROGRESS NOTES
" [facility] - 6th Floor Medical Telemetry    Cardiology  Progress Note    Patient Name: Jb Abbott  MRN: [id number]  Admission Date: 6/30/2024  Hospital Length of Stay: 0 days  Code Status: Full Code   Attending Physician: Tunde Cole MD   Primary Care Physician: Greer, Primary Doctor  Expected Discharge Date: 7/2/2024  Principal Problem:<principal problem not specified>    Subjective:   Chief Complaint:  Chest Pain     HPI:   Mr. Abbott is a 64 year old male, unknown to [facility] ([facility] in [place]), who presented to the hospital as transfer from [place] via EMS. Transferred to [facility] for cardiac care in the setting of NSTEMI. Presented to outlying facility with CP. Described as "pressure" with associated SOB. Also reported nausea. Reported prior history of CAD and prior stents. Patient loaded with Aspirin and started on Heparin Infusion for ACS Treatment. He is admitted to [facility] for NSTEMI Treatment.    Hospital Course:   7.1.24: NAD Noted. Denies CP/SOB. SR on Tele. Labs Stable.       PMH: Chronic Pain, CAD, DM, GERD, Hyperlipidemia, Hypertension  PSH: Appendectomy, LHC/PCI  Family History: Father- Hypertension/Heart Disease/DM, Mother- Hypertension/Stroke/DM  Social History: Tobacco- Active Smoker, Alcohol- Negative, Substance abuse- Negative     Previous Cardiac Diagnostics:   Echocardiogram (6.30.24):  Left Ventricle: The left ventricle is normal in size. Normal wall thickness. Normal wall motion. The basal inferior wall has brighter scaring and slightly aneurysmal, appears consistent with old MI. There is normal systolic function with a visually estimated ejection fraction of 55 - 60%. Grade I diastolic dysfunction.  Right Ventricle: Normal right ventricular cavity size. Systolic function is normal.  Aortic Valve: The aortic valve is a trileaflet valve.  Mitral Valve: The mitral valve is structurally normal. There is mild regurgitation.    LHC with PCI " (6.30.24):  Findings:  There is severe coronary artery disease.  Mid Left Circumflex has a 50% stenosis.  Proximal Right Coronary Artery had a 50-60% stenosis. Mid Right Coronary Artery was 100% occluded. The mid RCA lesion was thrombotic, and the culprit lesion. There were faint left-to-right collaterals from the septal perforating branches of the LAD. This was the culprit lesion. The lesions were successfully treated with overlapping SYNERGY XD 4.0x38MM and 3.5x32MM drug-eluting stents. Post-dilatation was performed using an NC EUPHORA 4.0x27MM angioplasty balloon inflated to nominal pressure. Following intervention there was 0% residual stenosis and KAREN grade 3 flow in the distal vessel.  Intravascular Ultrasound (IVUS) was performed prior to intervention to further characterize the lesion and measure the vessel for PCI.  LVEDP is 25 mmHg.    Review of Systems   Cardiovascular:  Negative for chest pain.   Respiratory:  Negative for shortness of breath.    All other systems reviewed and are negative.    Objective:     Vital Signs (Most Recent):  Temp: 97.7 °F (36.5 °C) (07/01/24 0823)  Pulse: 76 (07/01/24 0823)  Resp: 16 (07/01/24 0823)  BP: 115/72 (07/01/24 0823)  SpO2: 98 % (07/01/24 0823) Vital Signs (24h Range):  Temp:  [97.4 °F (36.3 °C)-98.2 °F (36.8 °C)] 97.7 °F (36.5 °C)  Pulse:  [67-83] 76  Resp:  [16-20] 16  SpO2:  [96 %-99 %] 98 %  BP: (104-148)/(57-76) 115/72   Weight: 76 kg (167 lb 8.8 oz)  Body mass index is 26.3 kg/m².  SpO2: 98 %       Intake/Output Summary (Last 24 hours) at 7/1/2024 1013  Last data filed at 7/1/2024 0545  Gross per 24 hour   Intake 600 ml   Output --   Net 600 ml     Lines/Drains/Airways       Peripheral Intravenous Line  Duration                  Peripheral IV - Single Lumen 18 G Posterior;Right Forearm -- days                  Significant Labs:   Recent Results (from the past 72 hour(s))   EKG 12-LEAD    Collection Time: 06/30/24 12:34 AM   Result Value Ref Range    QRS  Duration 98 ms    OHS QTC Calculation 405 ms   Troponin I    Collection Time: 06/30/24 12:50 AM   Result Value Ref Range    Troponin-I 12.324 (H) 0.000 - 0.045 ng/mL   Brain natriuretic peptide    Collection Time: 06/30/24 12:50 AM   Result Value Ref Range    Natriuretic Peptide 92.1 <=100.0 pg/mL   CBC with Differential    Collection Time: 06/30/24 12:50 AM   Result Value Ref Range    WBC 14.36 (H) 4.50 - 11.50 x10(3)/mcL    RBC 5.34 4.70 - 6.10 x10(6)/mcL    Hgb 14.8 14.0 - 18.0 g/dL    Hct 43.8 42.0 - 52.0 %    MCV 82.0 80.0 - 94.0 fL    MCH 27.7 27.0 - 31.0 pg    MCHC 33.8 33.0 - 36.0 g/dL    RDW 15.4 11.5 - 17.0 %    Platelet 237 130 - 400 x10(3)/mcL    MPV 10.7 (H) 7.4 - 10.4 fL    Neut % 80.3 %    Lymph % 12.2 %    Mono % 5.9 %    Eos % 0.7 %    Basophil % 0.6 %    Lymph # 1.75 0.6 - 4.6 x10(3)/mcL    Neut # 11.52 (H) 2.1 - 9.2 x10(3)/mcL    Mono # 0.85 0.1 - 1.3 x10(3)/mcL    Eos # 0.10 0 - 0.9 x10(3)/mcL    Baso # 0.09 <=0.2 x10(3)/mcL    IG# 0.05 (H) 0 - 0.04 x10(3)/mcL    IG% 0.3 %   Comprehensive metabolic panel    Collection Time: 06/30/24  1:30 AM   Result Value Ref Range    Sodium 137 136 - 145 mmol/L    Potassium 4.1 3.5 - 5.1 mmol/L    Chloride 102 98 - 107 mmol/L    CO2 26 23 - 31 mmol/L    Glucose 221 (H) 82 - 115 mg/dL    Blood Urea Nitrogen 20.0 8.4 - 25.7 mg/dL    Creatinine 0.89 0.73 - 1.18 mg/dL    Calcium 9.5 8.8 - 10.0 mg/dL    Protein Total 8.0 (H) 5.8 - 7.6 gm/dL    Albumin 4.2 3.4 - 4.8 g/dL    Globulin 3.8 (H) 2.4 - 3.5 gm/dL    Albumin/Globulin Ratio 1.1 1.1 - 2.0 ratio    Bilirubin Total 0.4 <=1.5 mg/dL    ALP 86 40 - 150 unit/L    ALT 39 0 - 55 unit/L    AST 63 (H) 5 - 34 unit/L    eGFR >60 mL/min/1.73/m2    Anion Gap 9.0 mEq/L    BUN/Creatinine Ratio 22    APTT    Collection Time: 06/30/24  1:31 AM   Result Value Ref Range    PTT 27.7 24.6 - 37.2 seconds   Protime-INR    Collection Time: 06/30/24  1:31 AM   Result Value Ref Range    PT 13.2 12.5 - 14.5 seconds    INR 1.0 <=1.3    Ethanol    Collection Time: 06/30/24  1:31 AM   Result Value Ref Range    Ethanol Level <10.0 <=10.0 mg/dL   Drug Screen, Urine    Collection Time: 06/30/24  1:32 AM   Result Value Ref Range    Amphetamines, Urine Negative Negative    Barbiturates, Urine Negative Negative    Benzodiazepine, Urine Negative Negative    Cannabinoids, Urine Negative Negative    Cocaine, Urine Negative Negative    Fentanyl, Urine Negative Negative    MDMA, Urine Negative Negative    Opiates, Urine Negative Negative    Phencyclidine, Urine Negative Negative    pH, Urine 6.0 3.0 - 11.0    Specific Gravity, Urine Auto >=1.030 1.001 - 1.035   Urinalysis, Reflex to Urine Culture    Collection Time: 06/30/24  1:32 AM    Specimen: Urine   Result Value Ref Range    Color, UA Yellow Yellow, Light-Yellow, Dark Yellow, Julia, Straw    Appearance, UA Cloudy (A) Clear    Specific Gravity, UA >=1.030 1.005 - 1.030    pH, UA 6.0 5.0 - 8.5    Protein, UA 2+ (A) Negative    Glucose, UA 1+ (A) Negative, Normal    Ketones, UA Negative Negative    Blood, UA 3+ (A) Negative    Bilirubin, UA Negative Negative    Urobilinogen, UA 0.2 0.2, 1.0, Normal    Nitrites, UA Negative Negative    Leukocyte Esterase, UA Negative Negative   Urinalysis, Microscopic    Collection Time: 06/30/24  1:32 AM   Result Value Ref Range    Bacteria, UA None Seen None Seen, Rare, Occasional /HPF    Mucous, UA Small (A) None Seen /LPF    RBC, UA >100 (A) None Seen, 0-2, 3-5, 0-5 /HPF    WBC, UA None Seen None Seen, 0-2, 3-5, 0-5 /HPF    Squamous Epithelial Cells, UA None Seen None Seen, Rare, Occasional, Occ /HPF   EKG 12-lead    Collection Time: 06/30/24  2:58 AM   Result Value Ref Range    QRS Duration 90 ms    OHS QTC Calculation 413 ms   APTT    Collection Time: 06/30/24  5:45 AM   Result Value Ref Range    PTT 34.0 (H) 23.2 - 33.7 seconds   Protime-INR    Collection Time: 06/30/24  5:45 AM   Result Value Ref Range    PT 13.6 12.5 - 14.5 seconds    INR 1.1 <=1.3   CBC with  Differential    Collection Time: 06/30/24  5:45 AM   Result Value Ref Range    WBC 13.10 (H) 4.50 - 11.50 x10(3)/mcL    RBC 4.98 4.70 - 6.10 x10(6)/mcL    Hgb 13.9 (L) 14.0 - 18.0 g/dL    Hct 41.6 (L) 42.0 - 52.0 %    MCV 83.5 80.0 - 94.0 fL    MCH 27.9 27.0 - 31.0 pg    MCHC 33.4 33.0 - 36.0 g/dL    RDW 15.5 11.5 - 17.0 %    Platelet 193 130 - 400 x10(3)/mcL    MPV 11.2 (H) 7.4 - 10.4 fL    Neut % 78.0 %    Lymph % 13.7 %    Mono % 6.6 %    Eos % 0.5 %    Basophil % 0.5 %    Lymph # 1.80 0.6 - 4.6 x10(3)/mcL    Neut # 10.22 (H) 2.1 - 9.2 x10(3)/mcL    Mono # 0.86 0.1 - 1.3 x10(3)/mcL    Eos # 0.06 0 - 0.9 x10(3)/mcL    Baso # 0.07 <=0.2 x10(3)/mcL    IG# 0.09 (H) 0 - 0.04 x10(3)/mcL    IG% 0.7 %    NRBC% 0.0 %   Troponin I    Collection Time: 06/30/24  5:45 AM   Result Value Ref Range    Troponin-I 16.547 (H) 0.000 - 0.045 ng/mL   Magnesium    Collection Time: 06/30/24  5:45 AM   Result Value Ref Range    Magnesium Level 2.00 1.60 - 2.60 mg/dL   Echo    Collection Time: 06/30/24  8:39 AM   Result Value Ref Range    BSA 1.89 m2    Jackson's Biplane MOD Ejection Fraction 61 %    A2C EF 56 %    A4C EF 64 %    LVOT stroke volume 70.97 cm3    LVIDd 4.95 3.5 - 6.0 cm    LV Systolic Volume 37.90 mL    LV Systolic Volume Index 20.3 mL/m2    LVIDs 3.10 2.1 - 4.0 cm    LV ESV A2C 46.40 mL    LV Diastolic Volume 116.00 mL    LV ESV A4C 34.60 mL    LV Diastolic Volume Index 62.03 mL/m2    LV EDV A2C 80.816262648934460 mL    LV EDV A4C 101.00 mL    Left Ventricular End Systolic Volume by Teichholz Method 37.90 mL    Left Ventricular End Diastolic Volume by Teichholz Method 116.00 mL    IVS 1.01 0.6 - 1.1 cm    LVOT diameter 2.10 cm    LVOT area 3.5 cm2    FS 37 28 - 44 %    Left Ventricle Relative Wall Thickness 0.40 cm    Posterior Wall 0.99 0.6 - 1.1 cm    LV mass 178.99 g    LV Mass Index 96 g/m2    MV Peak E Oliver 0.99 m/s    TDI LATERAL 0.09 m/s    TDI SEPTAL 0.09 m/s    E/E' ratio 11.00 m/s    MV Peak A Oliver 1.25 m/s    E/A  ratio 0.79     E wave deceleration time 214.00 msec    LV SEPTAL E/E' RATIO 11.00 m/s    LV LATERAL E/E' RATIO 11.00 m/s    LVOT peak keshawn 1.13 m/s    Left Ventricular Outflow Tract Mean Velocity 0.69 cm/s    Left Ventricular Outflow Tract Mean Gradient 2.00 mmHg    TAPSE 2.09 cm    RV/LV Ratio 0.69 cm    LA size 3.70 cm    RA Major Axis 4.53 cm    RA Width 3.63 cm    AV mean gradient 5 mmHg    AV peak gradient 9 mmHg    Ao peak keshawn 1.51 m/s    Ao VTI 31.40 cm    LVOT peak VTI 20.50 cm    AV valve area 2.26 cm²    AV Velocity Ratio 0.75     AV index (prosthetic) 0.65     SIRI by Velocity Ratio 2.59 cm²    MV mean gradient 2 mmHg    MV peak gradient 5 mmHg    MV valve area by continuity eq 2.12 cm2    MV VTI 33.5 cm    Mean e' 0.09 m/s    ZLVIDS -0.29     ZLVIDD -0.52     LA area A4C 13.30 cm2    LA area A2C 17.40 cm2    RVDD 3.40 cm   Troponin I    Collection Time: 06/30/24  9:31 AM   Result Value Ref Range    Troponin-I 18.905 (H) 0.000 - 0.045 ng/mL   Hemoglobin A1C    Collection Time: 06/30/24  9:31 AM   Result Value Ref Range    Hemoglobin A1c 7.4 (H) <=7.0 %    Estimated Average Glucose 165.7 mg/dL   Lipid Panel    Collection Time: 06/30/24  9:31 AM   Result Value Ref Range    Cholesterol Total 216 (H) <=200 mg/dL    HDL Cholesterol 33 (L) 35 - 60 mg/dL    Triglyceride 389 (H) 34 - 140 mg/dL    Cholesterol/HDL Ratio 7 (H) 0 - 5    Very Low Density Lipoprotein 78     LDL Cholesterol 105.00 50.00 - 140.00 mg/dL   EKG 12-LEAD on arrival to floor    Collection Time: 06/30/24 11:39 AM   Result Value Ref Range    QRS Duration 86 ms    OHS QTC Calculation 433 ms   Troponin I    Collection Time: 06/30/24 12:31 PM   Result Value Ref Range    Troponin-I 60.260 (H) 0.000 - 0.045 ng/mL   Troponin I    Collection Time: 06/30/24  6:50 PM   Result Value Ref Range    Troponin-I 21.223 (H) 0.000 - 0.045 ng/mL   POCT glucose    Collection Time: 06/30/24  8:15 PM   Result Value Ref Range    POCT Glucose 262 (H) 70 - 110 mg/dL    Troponin I    Collection Time: 07/01/24 12:54 AM   Result Value Ref Range    Troponin-I 11.206 (H) 0.000 - 0.045 ng/mL   Comprehensive Metabolic Panel    Collection Time: 07/01/24  4:20 AM   Result Value Ref Range    Sodium 135 (L) 136 - 145 mmol/L    Potassium 4.5 3.5 - 5.1 mmol/L    Chloride 102 98 - 107 mmol/L    CO2 28 23 - 31 mmol/L    Glucose 221 (H) 82 - 115 mg/dL    Blood Urea Nitrogen 25.7 8.4 - 25.7 mg/dL    Creatinine 1.05 0.73 - 1.18 mg/dL    Calcium 9.3 8.8 - 10.0 mg/dL    Protein Total 6.1 5.8 - 7.6 gm/dL    Albumin 3.4 3.4 - 4.8 g/dL    Globulin 2.7 2.4 - 3.5 gm/dL    Albumin/Globulin Ratio 1.3 1.1 - 2.0 ratio    Bilirubin Total 0.4 <=1.5 mg/dL    ALP 81 40 - 150 unit/L    ALT 40 0 - 55 unit/L    AST 35 (H) 5 - 34 unit/L    eGFR >60 mL/min/1.73/m2    Anion Gap 5.0 mEq/L    BUN/Creatinine Ratio 24    Magnesium    Collection Time: 07/01/24  4:20 AM   Result Value Ref Range    Magnesium Level 2.10 1.60 - 2.60 mg/dL   CBC with Differential    Collection Time: 07/01/24  4:20 AM   Result Value Ref Range    WBC 8.26 4.50 - 11.50 x10(3)/mcL    RBC 4.51 (L) 4.70 - 6.10 x10(6)/mcL    Hgb 12.6 (L) 14.0 - 18.0 g/dL    Hct 38.1 (L) 42.0 - 52.0 %    MCV 84.5 80.0 - 94.0 fL    MCH 27.9 27.0 - 31.0 pg    MCHC 33.1 33.0 - 36.0 g/dL    RDW 15.5 11.5 - 17.0 %    Platelet 172 130 - 400 x10(3)/mcL    MPV 10.9 (H) 7.4 - 10.4 fL    Neut % 62.8 %    Lymph % 25.4 %    Mono % 9.6 %    Eos % 1.2 %    Basophil % 0.6 %    Lymph # 2.10 0.6 - 4.6 x10(3)/mcL    Neut # 5.19 2.1 - 9.2 x10(3)/mcL    Mono # 0.79 0.1 - 1.3 x10(3)/mcL    Eos # 0.10 0 - 0.9 x10(3)/mcL    Baso # 0.05 <=0.2 x10(3)/mcL    IG# 0.03 0 - 0.04 x10(3)/mcL    IG% 0.4 %    NRBC% 0.0 %   EKG 12-lead    Collection Time: 07/01/24  7:42 AM   Result Value Ref Range    QRS Duration 86 ms    OHS QTC Calculation 418 ms     EKG:      Telemetry:  Sinus Rhythm    Physical Exam  Vitals and nursing note reviewed.   Constitutional:       Appearance: Normal appearance.   HENT:       Head: Normocephalic.      Mouth/Throat:      Mouth: Mucous membranes are moist.      Pharynx: Oropharynx is clear.   Cardiovascular:      Rate and Rhythm: Normal rate and regular rhythm.      Pulses:           Radial pulses are 2+ on the right side.      Heart sounds: Normal heart sounds.      Comments: Right Radial Cath Site Soft with no evidence of active bleed or hematoma.   Pulmonary:      Effort: Pulmonary effort is normal. No respiratory distress.      Breath sounds: Normal breath sounds. No wheezing or rales.   Abdominal:      General: There is no distension.      Palpations: Abdomen is soft.      Tenderness: There is no abdominal tenderness. There is no guarding.   Musculoskeletal:         General: Normal range of motion.      Cervical back: Neck supple.   Skin:     General: Skin is warm and dry.   Neurological:      General: No focal deficit present.      Mental Status: He is alert and oriented to person, place, and time. Mental status is at baseline.   Psychiatric:         Mood and Affect: Mood normal.         Behavior: Behavior normal.       Current Inpatient Medications:    Current Facility-Administered Medications:     acetaminophen tablet 650 mg, 650 mg, Oral, Q8H PRN, Gloria Hamilton MD, 650 mg at 06/30/24 2035    aspirin EC tablet 81 mg, 81 mg, Oral, Daily, Venkatesh Bolaños T, FNP, 81 mg at 07/01/24 0904    dextrose 10% bolus 125 mL 125 mL, 12.5 g, Intravenous, PRN, Richie Chi, NP    dextrose 10% bolus 250 mL 250 mL, 25 g, Intravenous, PRN, Chi, Richie W, NP    famotidine tablet 20 mg, 20 mg, Oral, BID, Gloria Hamilton MD, 20 mg at 07/01/24 0904    glucagon (human recombinant) injection 1 mg, 1 mg, Intramuscular, PRN, ChiRichie alonzo W, NP    glucose chewable tablet 16 g, 16 g, Oral, PRN, ChiRichie alonzo, NP    glucose chewable tablet 24 g, 24 g, Oral, PRN, ChiRichie bush, NP    HYDROcodone-acetaminophen 5-325 mg per tablet 1 tablet, 1  tablet, Oral, Q6H PRN, Richie Chi W, NP, 1 tablet at 07/01/24 0535    insulin aspart U-100 injection 0-5 Units, 0-5 Units, Subcutaneous, QID (AC + HS) PRN, Richie Chi, NP, 2 Units at 07/01/24 0535    melatonin tablet 6 mg, 6 mg, Oral, Nightly PRN, Gloria Hamilton MD    metoprolol succinate (TOPROL-XL) 24 hr split tablet 12.5 mg, 12.5 mg, Oral, Daily, Venkatesh Bolaños, FNP, 12.5 mg at 07/01/24 0904    morphine injection 2 mg, 2 mg, Intravenous, Q4H PRN, Gloria Hamilton MD    morphine injection 4 mg, 4 mg, Intravenous, Q4H PRN, Gloria Hamilton MD, 4 mg at 06/30/24 0320    nitroGLYCERIN SL tablet 0.4 mg, 0.4 mg, Sublingual, Q5 Min PRN, Venkatesh Bolaños, FNP    ondansetron injection 4 mg, 4 mg, Intravenous, Q8H PRN, Gloria Hamilton MD, 4 mg at 06/30/24 0320    sodium chloride 0.9% flush 10 mL, 10 mL, Intravenous, PRN, Gloria Hamilton MD    ticagrelor tablet 90 mg, 90 mg, Oral, BID, Macario Mishra Jr., MD, 90 mg at 07/01/24 0904    valsartan tablet 40 mg, 40 mg, Oral, Daily, Venkatesh Bolaños, FNP, 40 mg at 07/01/24 0904  VTE Risk Mitigation (From admission, onward)           Ordered     IP VTE LOW RISK PATIENT  Once         06/30/24 0304     Place sequential compression device  Until discontinued         06/30/24 0304                  Assessment:   Impression:  NSTEMI- Type I (Non Anterior Wall)  CAD    - Status Post PCI/Stenting Mid RCA (Left System Patent) (6.30.24)    - EF 55-60%  Hypertension (BP Stable)  Hyperlipidemia    - Rosuvastatin Allergy  Leukocytosis    - Afebrile   Anemia (Stable)  DM II    - A1C 7.4%  Nicotine Dependence (Active Smoker)  Chronic Pain (Stable)  No known History of GI Bleed    Plan:   Continue DAPT- Brilinta 90 Mg PO BID & Aspirin 81 Mg Daily (Re: RCA Stenting 6.30.24)  Continue Toprol XL 12.5 Mg PO Daily & Valsartan 40 Mg PO Daily  Protonix at discharge for gastric protection while on DAPT. For now Pepcid is okay.   Not on Statin due to Allergy  ""Itching & Throat Swelling"  Start Zetia 10 mg PO Daily. Consider PCSK 9 Inhibitor on outpatient basis if demonstrates compliance through follow up  Will need Nitro SL at discharge  Complete Smoking Cessation Strongly Advised. Will provide Nicotine Patch.  Right Radial Precautions/Activity Restrictions Discussed.  Likely DC Home Tomorrow if remains stable. For now, monitor on Tele.     MARIA A Correa  Cardiology  [facility] - 6th Floor Medical Telemetry  07/01/2024     Pt seen and examined by me, Elvie Méndez MD. I have reviewed the NP's note, assessment and plan. I have personally interviewed and examined the patient at bedside and agree with the findings. Medical decision making listed above were done under my guidance.     Physical exam:  NAD  Cardiovascular system: regular rhythm, no murmur.  Lungs: CTAB.  Abd: S/ST/ND  Extremities: No leg edema.      Assessment and Plan:  Inferior STEMI status post DAVID and recovering well.  Currently free of active complaints and ambulating without difficulties.  -we will continue Zetia and consider PCS canine as outpatient given statin intolerance  -early discharge tomorrow if no events overnight.  "

## 2024-07-02 PROBLEM — F17.200 TOBACCO DEPENDENCY: Status: ACTIVE | Noted: 2024-07-02

## 2024-07-02 LAB
BACTERIA #/AREA URNS AUTO: ABNORMAL /HPF
BILIRUB UR QL STRIP.AUTO: NEGATIVE
CLARITY UR: CLEAR
COLOR UR AUTO: YELLOW
GLUCOSE UR QL STRIP: ABNORMAL
HGB UR QL STRIP: ABNORMAL
HYALINE CASTS #/AREA URNS LPF: ABNORMAL /LPF
KETONES UR QL STRIP: ABNORMAL
LEUKOCYTE ESTERASE UR QL STRIP: 25
MUCOUS THREADS URNS QL MICRO: ABNORMAL /LPF
NITRITE UR QL STRIP: NEGATIVE
PH UR STRIP: 5.5 [PH]
POCT GLUCOSE: 157 MG/DL (ref 70–110)
POCT GLUCOSE: 166 MG/DL (ref 70–110)
POCT GLUCOSE: 200 MG/DL (ref 70–110)
POCT GLUCOSE: 221 MG/DL (ref 70–110)
PROT UR QL STRIP: ABNORMAL
RBC #/AREA URNS AUTO: ABNORMAL /HPF
SP GR UR STRIP.AUTO: 1.03 (ref 1–1.03)
SQUAMOUS #/AREA URNS LPF: ABNORMAL /HPF
UROBILINOGEN UR STRIP-ACNC: 2
WBC #/AREA URNS AUTO: ABNORMAL /HPF

## 2024-07-02 PROCEDURE — 87154 CUL TYP ID BLD PTHGN 6+ TRGT: CPT | Performed by: NURSE PRACTITIONER

## 2024-07-02 PROCEDURE — 25000003 PHARM REV CODE 250: Performed by: STUDENT IN AN ORGANIZED HEALTH CARE EDUCATION/TRAINING PROGRAM

## 2024-07-02 PROCEDURE — 25000003 PHARM REV CODE 250: Performed by: NURSE PRACTITIONER

## 2024-07-02 PROCEDURE — 81001 URINALYSIS AUTO W/SCOPE: CPT | Performed by: NURSE PRACTITIONER

## 2024-07-02 PROCEDURE — 63600175 PHARM REV CODE 636 W HCPCS: Performed by: NURSE PRACTITIONER

## 2024-07-02 PROCEDURE — 87184 SC STD DISK METHOD PER PLATE: CPT | Performed by: NURSE PRACTITIONER

## 2024-07-02 PROCEDURE — 25000003 PHARM REV CODE 250: Performed by: INTERNAL MEDICINE

## 2024-07-02 PROCEDURE — 36415 COLL VENOUS BLD VENIPUNCTURE: CPT | Performed by: NURSE PRACTITIONER

## 2024-07-02 PROCEDURE — 25000003 PHARM REV CODE 250: Performed by: EMERGENCY MEDICINE

## 2024-07-02 PROCEDURE — 21400001 HC TELEMETRY ROOM

## 2024-07-02 PROCEDURE — 87077 CULTURE AEROBIC IDENTIFY: CPT | Performed by: NURSE PRACTITIONER

## 2024-07-02 PROCEDURE — 87086 URINE CULTURE/COLONY COUNT: CPT | Performed by: NURSE PRACTITIONER

## 2024-07-02 PROCEDURE — 63600175 PHARM REV CODE 636 W HCPCS: Performed by: INTERNAL MEDICINE

## 2024-07-02 RX ORDER — IBUPROFEN 400 MG/1
400 TABLET ORAL EVERY 6 HOURS PRN
Status: DISCONTINUED | OUTPATIENT
Start: 2024-07-02 | End: 2024-07-04 | Stop reason: HOSPADM

## 2024-07-02 RX ORDER — IBUPROFEN 200 MG
1 TABLET ORAL DAILY
Status: DISCONTINUED | OUTPATIENT
Start: 2024-07-02 | End: 2024-07-04 | Stop reason: HOSPADM

## 2024-07-02 RX ADMIN — FAMOTIDINE 20 MG: 20 TABLET, FILM COATED ORAL at 08:07

## 2024-07-02 RX ADMIN — ACETAMINOPHEN 650 MG: 325 TABLET, FILM COATED ORAL at 04:07

## 2024-07-02 RX ADMIN — CEFEPIME 1 G: 1 INJECTION, POWDER, FOR SOLUTION INTRAMUSCULAR; INTRAVENOUS at 08:07

## 2024-07-02 RX ADMIN — ACETAMINOPHEN 650 MG: 325 TABLET, FILM COATED ORAL at 11:07

## 2024-07-02 RX ADMIN — METOPROLOL SUCCINATE 12.5 MG: 25 TABLET, EXTENDED RELEASE ORAL at 08:07

## 2024-07-02 RX ADMIN — TICAGRELOR 90 MG: 90 TABLET ORAL at 08:07

## 2024-07-02 RX ADMIN — INSULIN ASPART 2 UNITS: 100 INJECTION, SOLUTION INTRAVENOUS; SUBCUTANEOUS at 05:07

## 2024-07-02 RX ADMIN — IBUPROFEN 400 MG: 400 TABLET ORAL at 04:07

## 2024-07-02 RX ADMIN — HYDROCODONE BITARTRATE AND ACETAMINOPHEN 1 TABLET: 5; 325 TABLET ORAL at 08:07

## 2024-07-02 RX ADMIN — CEFEPIME 1 G: 1 INJECTION, POWDER, FOR SOLUTION INTRAMUSCULAR; INTRAVENOUS at 01:07

## 2024-07-02 RX ADMIN — VALSARTAN 40 MG: 40 TABLET, FILM COATED ORAL at 08:07

## 2024-07-02 RX ADMIN — ASPIRIN 81 MG: 81 TABLET, COATED ORAL at 08:07

## 2024-07-02 RX ADMIN — HYDROCODONE BITARTRATE AND ACETAMINOPHEN 1 TABLET: 5; 325 TABLET ORAL at 02:07

## 2024-07-02 RX ADMIN — EZETIMIBE 10 MG: 10 TABLET ORAL at 08:07

## 2024-07-02 NOTE — CONSULTS
[facility]  Hospital Medicine History & Physical Examination       Patient Name: Jb Abbott  MRN: [id number]  Patient Class: IP- Inpatient   Admission Date: 6/30/2024   Admitting Physician: [facility]   Length of Stay: 1  Attending Physician: Annalisa Becker MD  Primary Care Provider: [facility] Primary Doctor  Face-to-Face encounter date: 07/02/2024    Chief Complaint: Chest Pain (Pt transferred from [place], for NSTEMI, on heparin. )      Screening for Social Drivers for health:  Patient screened for food insecurity, housing instability, transportation needs, utility difficulties, and interpersonal safety (select all that apply as identified as concern)  []Housing or Food  []Transportation Needs  []Utility Difficulties  []Interpersonal safety  [x]None      Patient information was obtained from patient, patient's family, past medical records and ER records.  ED records were reviewed in detail and documented below    HISTORY OF PRESENT ILLNESS:   64-year-old male with significant history of CAD, type 2 diabetes mellitus, GERD, HTN, HLD, chronic pain [facility]  [facility].  Does have history of CAD requiring stenting in the past.  Patient was loaded with aspirin, heparin infusion and admitted to Cardiology Services.  Echocardiogram with normal wall motion and normal ejection fraction, grade 1 diastolic dysfunction.  Chest x-ray, labs stable except for leukocytosis, hyperglycemia and significantly elevated troponins.  Patient underwent LHC with stenting to mid RCA.  Now on dual antiplatelets-aspirin, Brilinta and other goal-directed medical treatment including Toprol-XL, valsartan.  Patient developed high-grade temp spikes with chills.  Hospital medicine services consulted by Cardiology for further evaluation.  Chest x-ray repeated-no  pneumonia.  UA concerning for UTI with pyuria, hematuria and leukocyte esterase.  Blood cultures and urine cultures also obtained      PAST MEDICAL HISTORY:   CAD   Type 2 diabetes mellitus   GERD   HTN  HLD   Chronic pain syndrome      PAST SURGICAL HISTORY:     Past Surgical History:   Procedure Laterality Date    APPENDECTOMY      CORONARY ANGIOPLASTY WITH STENT PLACEMENT      X4    LEFT HEART CATHETERIZATION N/A 6/30/2024    Procedure: Left heart cath;  Surgeon: Macario Mishra Jr., MD;  Location: [facility] CATH LAB;  Service: Cardiology;  Laterality: N/A;       ALLERGIES:   Rosuvastatin    FAMILY HISTORY:   Reviewed and negative    SOCIAL HISTORY:     Social History     Tobacco Use    Smoking status: Every Day     Types: Cigarettes    Smokeless tobacco: Never   Substance Use Topics    Alcohol use: Not Currently        HOME MEDICATIONS:     Prior to Admission medications    Not on File       REVIEW OF SYSTEMS:   Except as documented, all other systems reviewed and negative     PHYSICAL EXAM:     VITAL SIGNS: 24 HRS MIN & MAX LAST   Temp  Min: 97.8 °F (36.6 °C)  Max: 103.2 °F (39.6 °C) (!) 103.2 °F (39.6 °C)   BP  Min: 104/51  Max: 136/76 112/60   Pulse  Min: 80  Max: 122  101   Resp  Min: 16  Max: 20 18   SpO2  Min: 96 %  Max: 100 % 99 %     General appearance:  No acute distress  HENT: Atraumatic   Lungs: Clear to auscultation bilaterally.   Heart: RRR,No edema  Abdomen: Soft, non tender   Extremities: warm  Neuro:  Awake, alert, oriented x4  Psych/mental status: Appropriate mood and affect.      LABS AND IMAGING:     Recent Labs   Lab 06/30/24  0050 06/30/24  0545 07/01/24  0420   WBC 14.36* 13.10* 8.26   RBC 5.34 4.98 4.51*   HGB 14.8 13.9* 12.6*   HCT 43.8 41.6* 38.1*   MCV 82.0 83.5 84.5   MCH 27.7 27.9 27.9   MCHC 33.8 33.4 33.1   RDW 15.4 15.5 15.5    193 172   MPV 10.7* 11.2* 10.9*       Recent Labs   Lab 06/30/24  0130 06/30/24  0545 07/01/24  0420     --  135*   K 4.1  --  4.5     --   102   CO2 26  --  28   BUN 20.0  --  25.7   CREATININE 0.89  --  1.05   CALCIUM 9.5  --  9.3   MG  --  2.00 2.10   ALBUMIN 4.2  --  3.4   ALKPHOS 86  --  81   ALT 39  --  40   AST 63*  --  35*   BILITOT 0.4  --  0.4       Microbiology Results (last 7 days)       Procedure Component Value Units Date/Time    Urine culture [[id number]] Collected: 07/02/24 0844    Order Status: Sent Specimen: Urine, Clean Catch Updated: 07/02/24 1013    Blood Culture [[id number]] Collected: 07/02/24 0903    Order Status: Resulted Specimen: Blood from Antecubital, Left Updated: 07/02/24 0908    Blood Culture [[id number]] Collected: 07/02/24 0903    Order Status: Resulted Specimen: Blood from Antecubital, Right Updated: 07/02/24 0908             X-Ray Chest PA And Lateral  Narrative: EXAMINATION:  XR CHEST PA AND LATERAL    CLINICAL HISTORY:  Fever Rule out Pneumonia;, .    COMPARISON:  June 30, 2024    FINDINGS:  No alveolar consolidation, effusion, or pneumothorax is seen.   The thoracic aorta is normal  cardiac silhouette, central pulmonary vessels and mediastinum are normal in size and are grossly unremarkable.   visualized osseous structures are grossly unremarkable.  Impression: No acute chest disease is identified.    Electronically signed by: Dong Yusuf  Date:    07/02/2024  Time:    09:12      ASSESSMENT & PLAN:     Suspected acute bacterial UTI   Sepsis secondary to above   NSTEMI-type 1 status post PCI to RCA  CAD   Chronic diastolic heart failure-appears compensated  Essential HTN-stable   HLD   Type 2 diabetes mellitus with A1c-7.4%   Chronic tobacco use   Chronic pain   Prophylaxis    Blood cultures x2, urine cultures   IV cefepime pending cultures   Chest x-ray with no pneumonia   In case of continued high-grade temp spikes despite being on cefepime will obtain CT chest, abdomen, pelvis  GDM T per Cardiology   Patient is on aspirin, Brilinta, valsartan, Toprol-XL  Sliding scale for diabetes mellitus  Home medications  not reconciled   Can possibly DC on metformin with low-dose oral sulfonylurea  Continue nicotine patch  On Pepcid for GI protection   DVT prophylaxis-patient is ambulating well in the room      __________________________________________________________________________  INPATIENT LIST OF MEDICATIONS     Scheduled Meds:   aspirin  81 mg Oral Daily    ceFEPime IV (PEDS and ADULTS)  1 g Intravenous Q8H    ezetimibe  10 mg Oral QHS    famotidine  20 mg Oral BID    metoprolol succinate  12.5 mg Oral Daily    nicotine  1 patch Transdermal Daily    ticagrelor  90 mg Oral BID    valsartan  40 mg Oral Daily     Continuous Infusions:  PRN Meds:.  Current Facility-Administered Medications:     acetaminophen, 650 mg, Oral, Q8H PRN    dextrose 10%, 12.5 g, Intravenous, PRN    dextrose 10%, 25 g, Intravenous, PRN    glucagon (human recombinant), 1 mg, Intramuscular, PRN    glucose, 16 g, Oral, PRN    glucose, 24 g, Oral, PRN    HYDROcodone-acetaminophen, 1 tablet, Oral, Q6H PRN    ibuprofen, 400 mg, Oral, Q6H PRN    insulin aspart U-100, 0-5 Units, Subcutaneous, QID (AC + HS) PRN    melatonin, 6 mg, Oral, Nightly PRN    morphine, 2 mg, Intravenous, Q4H PRN    morphine, 4 mg, Intravenous, Q4H PRN    nitroGLYCERIN, 0.4 mg, Sublingual, Q5 Min PRN    ondansetron, 4 mg, Intravenous, Q8H PRN    sodium chloride 0.9%, 10 mL, Intravenous, PRN          If patient was admitted under observational status it is with my approval/permission.        All diagnosis and differential diagnosis have been reviewed; assessment and plan has been documented; I have personally reviewed the labs and test results that are presently available; I have reviewed the patients medication list; I have reviewed the consulting providers response and recommendations. I have reviewed or attempted to review medical records based upon their availability.    All of the patient and family questions have been addressed and answered. Patient's is agreeable to the above  stated plan. I will continue to monitor closely and make adjustments to medical management as needed.    If patient was admitted under observational status it is with my approval/permission.      Annalisa Becker MD   07/02/2024

## 2024-07-02 NOTE — PLAN OF CARE
Problem: Adult Inpatient Plan of Care  Goal: Plan of Care Review  Outcome: Progressing  Goal: Patient-Specific Goal (Individualized)  Outcome: Progressing  Goal: Absence of Hospital-Acquired Illness or Injury  Outcome: Progressing  Goal: Optimal Comfort and Wellbeing  Outcome: Progressing  Goal: Readiness for Transition of Care  Outcome: Progressing     Problem: Fall Injury Risk  Goal: Absence of Fall and Fall-Related Injury  Outcome: Progressing     Problem: Wound  Goal: Optimal Coping  Outcome: Progressing  Goal: Optimal Functional Ability  Outcome: Progressing  Goal: Absence of Infection Signs and Symptoms  Outcome: Progressing  Goal: Improved Oral Intake  Outcome: Progressing  Goal: Optimal Pain Control and Function  Outcome: Progressing  Goal: Skin Health and Integrity  Outcome: Progressing  Goal: Optimal Wound Healing  Outcome: Progressing     See today's assessment and documentation for interventions

## 2024-07-02 NOTE — DISCHARGE SUMMARY
" [facility] - 6th Floor Medical Telemetry  Cardiology  Progress Note       Patient Name: Jb Abbott  MRN: [id number]  Admission Date: 6/30/2024  Hospital Length of Stay: 1 days  Discharge Date and Time:  07/02/2024 8:17 AM  Attending Physician: Tunde Cole MD    Discharging Provider: MARIA A Correa  Primary Care Physician: [facility] Primary Doctor    HPI:   Chief Complaint:  Chest Pain     HPI:   Mr. Abbott is a 64 year old male, unknown to [facility] ([facility] in [place]), who presented to the hospital as transfer from [place] via EMS. Transferred to [facility] for cardiac care in the setting of NSTEMI. Presented to outlying facility with CP. Described as "pressure" with associated SOB. Also reported nausea. Reported prior history of CAD and prior stents. Patient loaded with Aspirin and started on Heparin Infusion for ACS Treatment. He is admitted to [facility] for NSTEMI Treatment.     Hospital Course:   7.1.24: NAD Noted. Denies CP/SOB. SR on Tele. Labs Stable.    7.2.24: NAD Noted. Denies CP/SOB. SR on Tele. Febrile.      PMH: Chronic Pain, CAD, DM, GERD, Hyperlipidemia, Hypertension  PSH: Appendectomy, LHC/PCI  Family History: Father- Hypertension/Heart Disease/DM, Mother- Hypertension/Stroke/DM  Social History: Tobacco- Active Smoker, Alcohol- Negative, Substance abuse- Negative     Previous Cardiac Diagnostics:   Echocardiogram (6.30.24):  Left Ventricle: The left ventricle is normal in size. Normal wall thickness. Normal wall motion. The basal inferior wall has brighter scaring and slightly aneurysmal, appears consistent with old MI. There is normal systolic function with a visually estimated ejection fraction of 55 - 60%. Grade I diastolic dysfunction.  Right Ventricle: Normal right ventricular cavity size. Systolic function is normal.  Aortic Valve: The aortic valve is a trileaflet valve.  Mitral Valve: The mitral valve is structurally normal. There is mild regurgitation.     [facility] with " PCI (6.30.24):  Findings:  There is severe coronary artery disease.  Mid Left Circumflex has a 50% stenosis.  Proximal Right Coronary Artery had a 50-60% stenosis. Mid Right Coronary Artery was 100% occluded. The mid RCA lesion was thrombotic, and the culprit lesion. There were faint left-to-right collaterals from the septal perforating branches of the LAD. This was the culprit lesion. The lesions were successfully treated with overlapping SYNERGY XD 4.0x38MM and 3.5x32MM drug-eluting stents. Post-dilatation was performed using an NC EUPHORA 4.0x27MM angioplasty balloon inflated to nominal pressure. Following intervention there was 0% residual stenosis and KAREN grade 3 flow in the distal vessel.  Intravascular Ultrasound (IVUS) was performed prior to intervention to further characterize the lesion and measure the vessel for PCI.  LVEDP is 25 mmHg.     Review of Systems   Constitutional:  Positive for chills and fever.   Respiratory:  Negative for shortness of breath.    Cardiovascular:  Negative for chest pain.   All other systems reviewed and are negative.    Indwelling Lines/Drains at time of discharge:  Lines/Drains/Airways       None                 Hospital Course: Please see Above.  No notes on file    Goals of Care Treatment Preferences:  Code Status: Full Code    Telemetry:  Sinus Rhythm     Physical Exam  Vitals and nursing note reviewed.   Constitutional:       Appearance: Normal appearance.   HENT:      Head: Normocephalic.      Mouth/Throat:      Mouth: Mucous membranes are moist.      Pharynx: Oropharynx is clear.   Cardiovascular:      Rate and Rhythm: Normal rate and regular rhythm.      Pulses:           Radial pulses are 2+ on the right side.      Heart sounds: Normal heart sounds.      Comments: Right Radial Cath Site Soft with no evidence of active bleed or hematoma.   Pulmonary:      Effort: Pulmonary effort is normal. No respiratory distress.      Breath sounds: Normal breath sounds. No wheezing  or rales.   Abdominal:      General: There is no distension.      Palpations: Abdomen is soft.      Tenderness: There is no abdominal tenderness. There is no guarding.   Musculoskeletal:         General: Normal range of motion.      Cervical back: Neck supple.   Skin:     General: Skin is warm and dry.   Neurological:      General: No focal deficit present.      Mental Status: He is alert and oriented to person, place, and time. Mental status is at baseline.   Psychiatric:         Mood and Affect: Mood normal.         Behavior: Behavior normal.    Significant Diagnostic Studies: Labs: BMP:   Recent Labs   Lab 07/01/24  0420   *   K 4.5      CO2 28   BUN 25.7   CREATININE 1.05   CALCIUM 9.3   MG 2.10   , CMP   Recent Labs   Lab 07/01/24  0420   *   K 4.5      CO2 28   BUN 25.7   CREATININE 1.05   CALCIUM 9.3   ALBUMIN 3.4   BILITOT 0.4   ALKPHOS 81   AST 35*   ALT 40   , CBC   Recent Labs   Lab 07/01/24 0420   WBC 8.26   HGB 12.6*   HCT 38.1*      , INR   Lab Results   Component Value Date    INR 1.1 06/30/2024    INR 1.0 06/30/2024   , Lipid Panel   Lab Results   Component Value Date    CHOL 216 (H) 06/30/2024    HDL 33 (L) 06/30/2024    TRIG 389 (H) 06/30/2024   , Troponin   Recent Labs   Lab 06/30/24  1231 06/30/24  1850 07/01/24  0054   TROPONINI 60.260* 21.223* 11.206*   , A1C:   Recent Labs   Lab 06/30/24  0931   HGBA1C 7.4*   , and All labs within the past 24 hours have been reviewed    Pending Diagnostic Studies:       None            There are no hospital problems to display for this patient.    No new Assessment & Plan notes have been filed under this hospital service since the last note was generated.  Service: Cardiology    Assessment:   Impression:  NSTEMI- Type I (Non Anterior Wall)  CAD    - Status Post PCI/Stenting Mid RCA (Left System Patent) (6.30.24)    - EF 55-60%  Possible UTI with Fever  Hypertension (BP Stable)  Hyperlipidemia    - Rosuvastatin  "Allergy  Leukocytosis    - Afebrile   Anemia (Stable)  DM II    - A1C 7.4%  Nicotine Dependence (Active Smoker)  Chronic Pain (Stable)  No known History of GI Bleed     Plan:   Continue DAPT- Brilinta 90 Mg PO BID & Aspirin 81 Mg Daily (Re: RCA Stenting 6.30.24)  Continue Toprol XL 12.5 Mg PO Daily & Valsartan 40 Mg PO Daily  Protonix at discharge for gastric protection while on DAPT. For now Pepcid is okay.   Not on Statin due to Allergy "Itching & Throat Swelling"  Continue Zetia 10 mg PO Daily. Consider PCSK 9 Inhibitor on outpatient basis if demonstrates compliance through follow up  Nitro SL at discharge  Complete Smoking Cessation Strongly Advised. Will provide Nicotine Patch.  Right Radial Precautions/Activity Restrictions Discussed.    Consult Hospital Medicine Re: Fever/Chills Possible UTI (Blood Cultures Sent)      MARIA A Correa  Cardiology  [facility] - 6th Floor Medical Telemetry    Pt seen and examined by me, Elvie Méndez MD. I have reviewed the NP's note, assessment and plan. I have personally interviewed and examined the patient at bedside and agree with the findings. Medical decision making listed above were done under my guidance.     Physical exam:  NAD  Cardiovascular system: regular rhythm, no murmur.  Lungs: CTAB.  Abd: S/ST/ND  Extremities: No leg edema.      Assessment and Plan:  Status post RCA STEMI in had recovered well with plans to discharge today however developed temperatures up to 103 with rigors.  Chest x-ray UA and blood cultures obtained.  Reoccurrence of temperature of 101° with rigors this afternoon and will consult Hospital Medicine for treatment recommendations.  Patient understands and has a for further evaluation.  -continue DA PT   -consult Hospital Medicine .  Suspect possible UTI given you a  -we will continue to follow blood culture  "

## 2024-07-03 PROBLEM — I21.4 NSTEMI (NON-ST ELEVATED MYOCARDIAL INFARCTION): Status: ACTIVE | Noted: 2024-07-03

## 2024-07-03 LAB
ACINETOBACTER CALCOACETICUS-BAUMANNII COMPLEX (OHS): NOT DETECTED
ALBUMIN SERPL-MCNC: 3.3 G/DL (ref 3.4–4.8)
ALBUMIN/GLOB SERPL: 0.9 RATIO (ref 1.1–2)
ALP SERPL-CCNC: 90 UNIT/L (ref 40–150)
ALT SERPL-CCNC: 39 UNIT/L (ref 0–55)
ANION GAP SERPL CALC-SCNC: 8 MEQ/L
AST SERPL-CCNC: 31 UNIT/L (ref 5–34)
BACTEROIDES FRAGILIS (OHS): NOT DETECTED
BASOPHILS # BLD AUTO: 0.03 X10(3)/MCL
BASOPHILS NFR BLD AUTO: 0.5 %
BILIRUB SERPL-MCNC: 0.8 MG/DL
BUN SERPL-MCNC: 20.7 MG/DL (ref 8.4–25.7)
C AURIS DNA BLD POS QL NAA+NON-PROBE: NOT DETECTED
C GATTII+NEOFOR DNA CSF QL NAA+NON-PROBE: NOT DETECTED
CALCIUM SERPL-MCNC: 9.2 MG/DL (ref 8.8–10)
CANDIDA ALBICANS (OHS): NOT DETECTED
CANDIDA GLABRATA (OHS): NOT DETECTED
CANDIDA KRUSEI (OHS): NOT DETECTED
CANDIDA PARAPSILOSIS (OHS): NOT DETECTED
CANDIDA TROPICALIS (OHS): NOT DETECTED
CHLORIDE SERPL-SCNC: 102 MMOL/L (ref 98–107)
CO2 SERPL-SCNC: 24 MMOL/L (ref 23–31)
CREAT SERPL-MCNC: 1.15 MG/DL (ref 0.73–1.18)
CREAT/UREA NIT SERPL: 18
CTX-M (OHS): ABNORMAL
ENTEROBACTER CLOACAE COMPLEX (OHS): NOT DETECTED
ENTEROBACTERALES (OHS): NOT DETECTED
ENTEROCOCCUS FAECALIS (OHS): NOT DETECTED
ENTEROCOCCUS FAECIUM (OHS): NOT DETECTED
EOSINOPHIL # BLD AUTO: 0.22 X10(3)/MCL (ref 0–0.9)
EOSINOPHIL NFR BLD AUTO: 3.4 %
ERYTHROCYTE [DISTWIDTH] IN BLOOD BY AUTOMATED COUNT: 15.5 % (ref 11.5–17)
ESCHERICHIA COLI (OHS): NOT DETECTED
GFR SERPLBLD CREATININE-BSD FMLA CKD-EPI: >60 ML/MIN/1.73/M2
GLOBULIN SER-MCNC: 3.5 GM/DL (ref 2.4–3.5)
GLUCOSE SERPL-MCNC: 130 MG/DL (ref 82–115)
GP B STREP DNA CSF QL NAA+NON-PROBE: NOT DETECTED
HAEM INFLU DNA CSF QL NAA+NON-PROBE: NOT DETECTED
HCT VFR BLD AUTO: 37.5 % (ref 42–52)
HGB BLD-MCNC: 12.6 G/DL (ref 14–18)
IMM GRANULOCYTES # BLD AUTO: 0.04 X10(3)/MCL (ref 0–0.04)
IMM GRANULOCYTES NFR BLD AUTO: 0.6 %
IMP (OHS): ABNORMAL
KLEBSIELLA AEROGENES (OHS): NOT DETECTED
KLEBSIELLA OXYTOCA (OHS): NOT DETECTED
KLEBSIELLA PNEUMONIAE GROUP (OHS): NOT DETECTED
KPC (OHS): ABNORMAL
L MONOCYTOG DNA CSF QL NAA+NON-PROBE: NOT DETECTED
LYMPHOCYTES # BLD AUTO: 0.54 X10(3)/MCL (ref 0.6–4.6)
LYMPHOCYTES NFR BLD AUTO: 8.4 %
MCH RBC QN AUTO: 28.3 PG (ref 27–31)
MCHC RBC AUTO-ENTMCNC: 33.6 G/DL (ref 33–36)
MCR-1 (OHS): ABNORMAL
MCV RBC AUTO: 84.3 FL (ref 80–94)
MECA/C (OHS): ABNORMAL
MECA/C AND MREJ (MRSA)(OHS): NOT DETECTED
MONOCYTES # BLD AUTO: 0.29 X10(3)/MCL (ref 0.1–1.3)
MONOCYTES NFR BLD AUTO: 4.5 %
N MEN DNA CSF QL NAA+NON-PROBE: NOT DETECTED
NDM (OHS): ABNORMAL
NEUTROPHILS # BLD AUTO: 5.33 X10(3)/MCL (ref 2.1–9.2)
NEUTROPHILS NFR BLD AUTO: 82.6 %
NRBC BLD AUTO-RTO: 0 %
OXA-48-LIKE (OHS): ABNORMAL
PLATELET # BLD AUTO: 128 X10(3)/MCL (ref 130–400)
PLATELETS.RETICULATED NFR BLD AUTO: 4.3 % (ref 0.9–11.2)
PMV BLD AUTO: 10.4 FL (ref 7.4–10.4)
POCT GLUCOSE: 170 MG/DL (ref 70–110)
POCT GLUCOSE: 174 MG/DL (ref 70–110)
POCT GLUCOSE: 211 MG/DL (ref 70–110)
POTASSIUM SERPL-SCNC: 4.1 MMOL/L (ref 3.5–5.1)
PROT SERPL-MCNC: 6.8 GM/DL (ref 5.8–7.6)
PROTEUS SPP. (OHS): NOT DETECTED
PSEUDOMONAS AERUGINOSA (OHS): NOT DETECTED
RBC # BLD AUTO: 4.45 X10(6)/MCL (ref 4.7–6.1)
S ENT+BONG DNA STL QL NAA+NON-PROBE: NOT DETECTED
S PNEUM DNA CSF QL NAA+NON-PROBE: NOT DETECTED
SERRATIA MARCESCENS (OHS): NOT DETECTED
SODIUM SERPL-SCNC: 134 MMOL/L (ref 136–145)
STAPHYLOCOCCUS AUREUS (OHS): DETECTED
STAPHYLOCOCCUS EPIDERMIDIS (OHS): NOT DETECTED
STAPHYLOCOCCUS LUGDUNENSIS (OHS): NOT DETECTED
STAPHYLOCOCCUS SPP. (OHS): DETECTED
STENOTROPHOMONAS MALTOPHILIA (OHS): NOT DETECTED
STREPTOCOCCUS PYOGENES (GROUP A)(OHS): NOT DETECTED
STREPTOCOCCUS SPP. (OHS): NOT DETECTED
VANA/B (OHS): ABNORMAL
VIM (OHS): ABNORMAL
WBC # BLD AUTO: 6.45 X10(3)/MCL (ref 4.5–11.5)

## 2024-07-03 PROCEDURE — 25000003 PHARM REV CODE 250: Performed by: EMERGENCY MEDICINE

## 2024-07-03 PROCEDURE — 87040 BLOOD CULTURE FOR BACTERIA: CPT | Performed by: INTERNAL MEDICINE

## 2024-07-03 PROCEDURE — 25000003 PHARM REV CODE 250: Performed by: INTERNAL MEDICINE

## 2024-07-03 PROCEDURE — 80053 COMPREHEN METABOLIC PANEL: CPT | Performed by: INTERNAL MEDICINE

## 2024-07-03 PROCEDURE — 85025 COMPLETE CBC W/AUTO DIFF WBC: CPT | Performed by: INTERNAL MEDICINE

## 2024-07-03 PROCEDURE — 25000003 PHARM REV CODE 250: Performed by: NURSE PRACTITIONER

## 2024-07-03 PROCEDURE — 63600175 PHARM REV CODE 636 W HCPCS: Performed by: NURSE PRACTITIONER

## 2024-07-03 PROCEDURE — 21400001 HC TELEMETRY ROOM

## 2024-07-03 PROCEDURE — 36415 COLL VENOUS BLD VENIPUNCTURE: CPT | Performed by: INTERNAL MEDICINE

## 2024-07-03 PROCEDURE — S4991 NICOTINE PATCH NONLEGEND: HCPCS | Performed by: NURSE PRACTITIONER

## 2024-07-03 PROCEDURE — 63600175 PHARM REV CODE 636 W HCPCS: Performed by: INTERNAL MEDICINE

## 2024-07-03 PROCEDURE — 25000003 PHARM REV CODE 250: Performed by: STUDENT IN AN ORGANIZED HEALTH CARE EDUCATION/TRAINING PROGRAM

## 2024-07-03 RX ORDER — EZETIMIBE 10 MG/1
10 TABLET ORAL NIGHTLY
Qty: 90 TABLET | Refills: 3 | Status: SHIPPED | OUTPATIENT
Start: 2024-07-03 | End: 2025-07-03

## 2024-07-03 RX ORDER — METOPROLOL SUCCINATE 25 MG/1
12.5 TABLET, EXTENDED RELEASE ORAL DAILY
Qty: 45 TABLET | Refills: 3 | Status: SHIPPED | OUTPATIENT
Start: 2024-07-04 | End: 2025-07-04

## 2024-07-03 RX ORDER — NITROGLYCERIN 0.4 MG/1
0.4 TABLET SUBLINGUAL EVERY 5 MIN PRN
Qty: 25 TABLET | Refills: 2 | Status: SHIPPED | OUTPATIENT
Start: 2024-07-03 | End: 2025-07-03

## 2024-07-03 RX ORDER — IBUPROFEN 200 MG
1 TABLET ORAL DAILY
Qty: 30 PATCH | Refills: 2 | Status: SHIPPED | OUTPATIENT
Start: 2024-07-03

## 2024-07-03 RX ORDER — VALSARTAN 40 MG/1
40 TABLET ORAL DAILY
Qty: 90 TABLET | Refills: 3 | Status: SHIPPED | OUTPATIENT
Start: 2024-07-04 | End: 2025-07-04

## 2024-07-03 RX ORDER — ASPIRIN 81 MG/1
81 TABLET ORAL DAILY
Qty: 30 TABLET | Refills: 11 | Status: SHIPPED | OUTPATIENT
Start: 2024-07-04 | End: 2025-07-04

## 2024-07-03 RX ORDER — CIPROFLOXACIN 500 MG/1
500 TABLET ORAL EVERY 12 HOURS
Qty: 10 TABLET | Refills: 0 | Status: SHIPPED | OUTPATIENT
Start: 2024-07-03

## 2024-07-03 RX ORDER — PANTOPRAZOLE SODIUM 40 MG/1
40 TABLET, DELAYED RELEASE ORAL DAILY
Qty: 90 TABLET | Refills: 3 | Status: SHIPPED | OUTPATIENT
Start: 2024-07-03 | End: 2025-07-03

## 2024-07-03 RX ADMIN — FAMOTIDINE 20 MG: 20 TABLET, FILM COATED ORAL at 08:07

## 2024-07-03 RX ADMIN — HYDROCODONE BITARTRATE AND ACETAMINOPHEN 1 TABLET: 5; 325 TABLET ORAL at 04:07

## 2024-07-03 RX ADMIN — EZETIMIBE 10 MG: 10 TABLET ORAL at 08:07

## 2024-07-03 RX ADMIN — INSULIN ASPART 1 UNITS: 100 INJECTION, SOLUTION INTRAVENOUS; SUBCUTANEOUS at 08:07

## 2024-07-03 RX ADMIN — VANCOMYCIN HYDROCHLORIDE 2000 MG: 500 INJECTION, POWDER, LYOPHILIZED, FOR SOLUTION INTRAVENOUS at 01:07

## 2024-07-03 RX ADMIN — METOPROLOL SUCCINATE 12.5 MG: 25 TABLET, EXTENDED RELEASE ORAL at 08:07

## 2024-07-03 RX ADMIN — HYDROCODONE BITARTRATE AND ACETAMINOPHEN 1 TABLET: 5; 325 TABLET ORAL at 08:07

## 2024-07-03 RX ADMIN — ACETAMINOPHEN 650 MG: 325 TABLET, FILM COATED ORAL at 04:07

## 2024-07-03 RX ADMIN — TICAGRELOR 90 MG: 90 TABLET ORAL at 08:07

## 2024-07-03 RX ADMIN — CEFEPIME 1 G: 1 INJECTION, POWDER, FOR SOLUTION INTRAMUSCULAR; INTRAVENOUS at 04:07

## 2024-07-03 RX ADMIN — VALSARTAN 40 MG: 40 TABLET, FILM COATED ORAL at 08:07

## 2024-07-03 RX ADMIN — NICOTINE 1 PATCH: 14 PATCH TRANSDERMAL at 08:07

## 2024-07-03 RX ADMIN — ASPIRIN 81 MG: 81 TABLET, COATED ORAL at 08:07

## 2024-07-03 RX ADMIN — CEFEPIME 1 G: 1 INJECTION, POWDER, FOR SOLUTION INTRAMUSCULAR; INTRAVENOUS at 08:07

## 2024-07-03 NOTE — PROGRESS NOTES
[facility] Hospital Medicine Progress Note        Chief Complaint: Inpatient Follow-up    HPI:   64-year-old male with significant history of CAD, type 2 diabetes mellitus, GERD, HTN, HLD, chronic pain was admitted to Cardiology Services as a transfer from outlying facility for higher level of care for NSTEMI.  [facility], [facility].  Does have history of CAD requiring stenting in the past.  Patient was loaded with aspirin, heparin infusion and admitted to Cardiology Services.  Echocardiogram with normal wall motion and normal ejection fraction, grade 1 diastolic dysfunction.  Chest x-ray, labs stable except for leukocytosis, hyperglycemia and significantly elevated troponins.  Patient underwent LHC with stenting to mid RCA.  Now on dual antiplatelets-aspirin, Brilinta and other goal-directed medical treatment including Toprol-XL, valsartan.  Patient developed high-grade temp spikes with chills.  Hospital medicine services consulted by Cardiology for further evaluation.  Chest x-ray repeated-no pneumonia.  UA concerning for UTI with pyuria, hematuria and leukocyte esterase.  Blood cultures and urine cultures also obtained.  Initiated cefepime pending cultures    Interval Hx:   Patient seen at bedside, comfortably walking around in the room and hallway, hemodynamics stable, last episode of high-grade temp spike was yesterday evening, no high-grade temp spike today morning, no new complaints    Objective/physical exam:  General: In no acute distress, afebrile  Chest: Clear to auscultation bilaterally  Heart: S1, S2, no appreciable murmur  Abdomen: Soft, nontender, BS +  MSK: Warm, no lower extremity edema, no clubbing or cyanosis  Neurologic: Alert and oriented x4, moving all extremities with good strength     VITAL SIGNS: 24 HRS MIN & MAX LAST   Temp  Min: 97.8 °F (36.6 °C)  Max: 103.2 °F (39.6 °C) 100 °F (37.8 °C)   BP  Min: 109/53  Max: 136/81  136/81   Pulse  Min: 90  Max: 112  101   Resp  Min: 16  Max: 20 18   SpO2  Min: 95 %  Max: 99 % 95 %       Recent Labs   Lab 07/01/24  0420   WBC 8.26   RBC 4.51*   HGB 12.6*   HCT 38.1*   MCV 84.5   MCH 27.9   MCHC 33.1   RDW 15.5      MPV 10.9*         Recent Labs   Lab 07/01/24  0420   *   K 4.5      CO2 28   BUN 25.7   CREATININE 1.05   CALCIUM 9.3   MG 2.10   ALBUMIN 3.4   ALKPHOS 81   ALT 40   AST 35*   BILITOT 0.4          Microbiology Results (last 7 days)       Procedure Component Value Units Date/Time    Urine culture [[id number]] Collected: 07/02/24 0844    Order Status: Sent Specimen: Urine, Clean Catch Updated: 07/02/24 1013    Blood Culture [[id number]] Collected: 07/02/24 0903    Order Status: Resulted Specimen: Blood from Antecubital, Left Updated: 07/02/24 0908    Blood Culture [[id number]] Collected: 07/02/24 0903    Order Status: Resulted Specimen: Blood from Antecubital, Right Updated: 07/02/24 0908             Scheduled Med:   aspirin  81 mg Oral Daily    ceFEPime IV (PEDS and ADULTS)  1 g Intravenous Q8H    ezetimibe  10 mg Oral QHS    famotidine  20 mg Oral BID    metoprolol succinate  12.5 mg Oral Daily    nicotine  1 patch Transdermal Daily    ticagrelor  90 mg Oral BID    valsartan  40 mg Oral Daily          Assessment/Plan:    Staph bacteremia-true versus contamination  Suspected acute bacterial UTI   Sepsis secondary to above   NSTEMI-type 1 status post PCI to RCA  CAD   Chronic diastolic heart failure-appears compensated  Essential HTN-stable   HLD   Type 2 diabetes mellitus with A1c-7.4%   Chronic tobacco use   Chronic pain   Prophylaxis     Blood cultures from 07/02 with 1 bottle growing Staph  Await final speciation   Repeat blood cultures today   Urine cultures pending   IV cefepime was added on 07/02 for suspected UTI pending cultures which will be continued  IV vancomycin added today for Staph bacteremia, follow up final speciation and repeat cultures  CT chest,  abdomen, pelvis with no clear source of sepsis except for possible cystitis  GDM T per Cardiology   Patient is on aspirin, Brilinta, valsartan, Toprol-XL  Sliding scale for diabetes mellitus  Home medications not reconciled   Can possibly DC on metformin with low-dose oral sulfonylurea  Continue nicotine patch  On Pepcid for GI protection   DVT prophylaxis-patient is ambulating well in the room    Patient wanting to go home today   Do not recommend DC since he has not fever free for 24 hours and also given positive blood cultures which could be true positivity  Await final speciation from blood cultures and also repeat blood cultures ordered today      Annalisa Becker MD   07/03/2024

## 2024-07-03 NOTE — DISCHARGE SUMMARY
"[facility] - 6th Floor Medical Telemetry    Cardiology  Discharge Summary      Patient Name: Jb Abbott  MRN: [id number]  Admission Date: 6/30/2024  Hospital Length of Stay: 2 days  Discharge Date and Time:  07/03/2024 10:33 AM  Attending Physician: Tunde Cole MD  Discharging Provider: MAIRA A Quezada  Primary Care Physician: Greer, Primary Doctor    HPI/Hospital Course: Mr. Abbott is a 64 year old male, unknown to [facility] ([facility] in [place]), who presented to the hospital as transfer from [place] via EMS. Transferred to [facility] for cardiac care in the setting of NSTEMI. Presented to outlying facility with CP. Described as "pressure" with associated SOB. Also reported nausea. Reported prior history of CAD and prior stents. Patient loaded with Aspirin and started on Heparin Infusion for ACS Treatment. He is admitted to [facility] for NSTEMI Treatment.     Hospital Course:   7.1.24: NAD Noted. Denies CP/SOB. SR on Tele. Labs Stable.    7.2.24: NAD Noted. Denies CP/SOB. SR on Tele. Febrile.   7.3.24: NAD. VSS. Fevers Noted. St noted with fevers. No complaints of CP/SOB/Palps. "I feel okay"     PMH: Chronic Pain, CAD, DM, GERD, Hyperlipidemia, Hypertension  PSH: Appendectomy, LHC/PCI  Family History: Father- Hypertension/Heart Disease/DM, Mother- Hypertension/Stroke/DM  Social History: Tobacco- Active Smoker, Alcohol- Negative, Substance abuse- Negative     Previous Cardiac Diagnostics:   Echocardiogram (6.30.24):  Left Ventricle: The left ventricle is normal in size. Normal wall thickness. Normal wall motion. The basal inferior wall has brighter scaring and slightly aneurysmal, appears consistent with old MI. There is normal systolic function with a visually estimated ejection fraction of 55 - 60%. Grade I diastolic dysfunction.  Right Ventricle: Normal right ventricular cavity size. Systolic function is normal.  Aortic Valve: The aortic valve is a trileaflet valve.  Mitral Valve: The " mitral valve is structurally normal. There is mild regurgitation.     [facility] with PCI (6.30.24):  Findings:  There is severe coronary artery disease.  Mid Left Circumflex has a 50% stenosis.  Proximal Right Coronary Artery had a 50-60% stenosis. Mid Right Coronary Artery was 100% occluded. The mid RCA lesion was thrombotic, and the culprit lesion. There were faint left-to-right collaterals from the septal perforating branches of the LAD. This was the culprit lesion. The lesions were successfully treated with overlapping SYNERGY XD 4.0x38MM and 3.5x32MM drug-eluting stents. Post-dilatation was performed using an NC EUPHORA 4.0x27MM angioplasty balloon inflated to nominal pressure. Following intervention there was 0% residual stenosis and KAREN grade 3 flow in the distal vessel.  Intravascular Ultrasound (IVUS) was performed prior to intervention to further characterize the lesion and measure the vessel for PCI.  LVEDP is 25 mmHg.    Procedure(s) (LRB):  Left heart cath (N/A)   Consults:   Consults (From admission, onward)          Status Ordering Provider     Inpatient consult to [facility]  Once        Provider:  Fortino Caal MD    Completed NOEL YOO          Final Active Diagnoses:    Diagnosis Date Noted POA    PRINCIPAL PROBLEM:  NSTEMI (non-ST elevated myocardial infarction) [I21.4] 07/03/2024 Yes    Tobacco dependency [F17.200] 07/02/2024 Yes      Problems Resolved During this Admission:     Discharged Condition: stable  Review of Systems   Cardiovascular:  Negative for chest pain, dyspnea on exertion and palpitations.   Respiratory:  Negative for shortness of breath.    All other systems reviewed and are negative.    Physical Exam  Vitals and nursing note reviewed.   HENT:      Head: Normocephalic.      Nose: Nose normal.      Mouth/Throat:      Mouth: Mucous membranes are moist.   Eyes:      Pupils: Pupils are equal, round, and reactive to light.   Cardiovascular:      Rate and Rhythm:  Normal rate and regular rhythm.      Pulses: Normal pulses.   Pulmonary:      Effort: Pulmonary effort is normal.   Abdominal:      General: Bowel sounds are normal.      Palpations: Abdomen is soft.   Musculoskeletal:      Cervical back: Normal range of motion.   Skin:     General: Skin is warm.      Comments: Right Radial Cath Site Soft with no evidence of active bleed or hematoma.    Neurological:      Mental Status: He is alert and oriented to person, place, and time.   Psychiatric:         Mood and Affect: Mood normal.         Behavior: Behavior normal.       Disposition: Home or Self Care  Follow Up:   Follow-up Information       Tunde Cole MD Follow up.    Specialty: Cardiology  Why: [place] Location  Contact information:  [facility] [id number] [phone]                           Patient Instructions:      Ambulatory referral/consult to Smoking Cessation Program   Standing Status: Future   Referral Priority: Routine Referral Type: Consultation   Referral Reason: Specialty Services Required   Requested Specialty: CTTS   Number of Visits Requested: 1     Diet Cardiac     Lifting restrictions   Order Comments: Do not lift antthing greater than 5 pounds for 1 week     No driving until:   Order Comments: No driving with affect extremity for 1 week     Activity as tolerated     Shower on day dressing removed (No bath)   Order Comments: Showers only for 1 week     Medications:  Reconciled Home Medications:      Medication List        START taking these medications      aspirin 81 MG EC tablet  Commonly known as: ECOTRIN  Take 1 tablet (81 mg total) by mouth once daily.  Start taking on: July 4, 2024     ciprofloxacin HCl 500 MG tablet  Commonly known as: CIPRO  Take 1 tablet (500 mg total) by mouth every 12 (twelve) hours.     ezetimibe 10 mg tablet  Commonly known as: ZETIA  Take 1 tablet (10 mg total) by mouth every evening.     metoprolol succinate 25 MG 24 hr tablet  Commonly  known as: TOPROL-XL  Take 0.5 tablets (12.5 mg total) by mouth once daily.  Start taking on: July 4, 2024     nicotine 14 mg/24 hr  Commonly known as: NICODERM CQ  Place 1 patch onto the skin once daily.     nitroGLYCERIN 0.4 MG SL tablet  Commonly known as: NITROSTAT  Place 1 tablet (0.4 mg total) under the tongue every 5 (five) minutes as needed for Chest pain.     pantoprazole 40 MG tablet  Commonly known as: PROTONIX  Take 1 tablet (40 mg total) by mouth once daily.     ticagrelor 90 mg tablet  Commonly known as: BRILINTA  Take 1 tablet (90 mg total) by mouth 2 (two) times daily.     valsartan 40 MG tablet  Commonly known as: DIOVAN  Take 1 tablet (40 mg total) by mouth once daily.  Start taking on: July 4, 2024            Impression:  NSTEMI- Type I (Non Anterior Wall)  CAD    - Status Post PCI/Stenting Mid RCA (Left System Patent) (6.30.24)    - EF 55-60%  Possible UTI    - Mild nonspecific perinephric stranding bilaterally on CT (7.3.24)  Fever  Hepatic Steatosis  Cholelithiasis  Hyponatremia   Hypertension (BP Stable)  Hyperlipidemia    - Rosuvastatin Allergy  Thrombocytopenia   Leukocytosis    - Afebrile   Anemia (Stable)  DM II    - A1C 7.4%  Nicotine Dependence (Active Smoker)  Chronic Pain (Stable)  No known History of GI Bleed    Plan:   Per Hospital medicine Team: If fever free for 24 hours and culture negative, can be discharged later this after noon with Cipro 500 mg oral BID for 5 days. If culture positive, will wait for sensitivity.   Continue DAPT- Brilinta 90 Mg PO BID & Aspirin 81 Mg Daily (Re: RCA Stenting 6.30.24)  Continue Toprol XL 12.5 Mg PO Daily & Valsartan 40 Mg PO Daily  Protonix at discharge for gastric protection while on DAPT. For now Pepcid is okay.   No Statin due to Allergy   Continue Zetia 10 mg PO Daily. Consider PCSK 9 Inhibitor on outpatient basis if demonstrates compliance through follow up  Nitro SL at discharge  Complete Smoking Cessation Strongly Advised. Will provide  Nicotine Patch.  Right Radial Precautions/Activity Restrictions Discussed.  IV Antibiotics per  Medicine Team   Keep Mag > 2 and Potassium > 4   Follow-up with Dr. Cole in 7-10 days    Time spent on the discharge of patient: 37 minutes    MARIA A Quezada  Cardiology  [facility] - 6th Floor Medical Telemetry    Pt seen and examined by me, Elvie Méndez MD. I have reviewed the NP's note, assessment and plan. I have personally interviewed and examined the patient at bedside and agree with the findings. Medical decision making listed above were done under my guidance.     Physical exam:  NAD  Cardiovascular system: regular rhythm, no murmur.  Lungs: CTAB.  Abd: S/ST/ND  Extremities: No leg edema.      Assessment and Plan:  Did with inferior treated with DAVID and developed some fevers and rigors during hospitalization.  Found to have UTI and started on antibiotics.  We will follow the hospitalist's recommendation and discharged on ciprofloxacin 500 b.i.d. for 5 days.  Given IV antibiotics during this hospitalization.  Continue DA PT and follow-up with Dr. Mishra as outpatient.  Patient with statin intolerance and starting Zetia.  Would PCSK9 as outpatient.

## 2024-07-03 NOTE — PROGRESS NOTES
"Pharmacokinetic Initial Assessment: IV Vancomycin    Assessment/Plan:    Initiate intravenous vancomycin with loading dose of 2000 mg once followed by a maintenance dose of vancomycin 1750mg IV every 24 hours  Desired empiric serum trough concentration is 15 to 20 mcg/mL  Draw vancomycin trough level 60 min prior to third dose on 07/05 at approximately 1200.  Pharmacy will continue to follow and monitor vancomycin.      Please contact pharmacy at extension [phone] with any questions regarding this assessment.     Thank you for the consult,   Dorian Keenan       Patient brief summary:  Jb Abbott is a 64 y.o. male initiated on antimicrobial therapy with IV Vancomycin for treatment of suspected bacteremia    Drug Allergies:   Review of patient's allergies indicates:   Allergen Reactions    Rosuvastatin Other (See Comments)       Actual Body Weight:   76kg    Renal Function:   Estimated Creatinine Clearance: 61.3 mL/min (based on SCr of 1.15 mg/dL).,     Dialysis Method (if applicable):  N/A    CBC (last 72 hours):  Recent Labs   Lab Result Units 07/01/24  0420 07/03/24  0711   WBC x10(3)/mcL 8.26 6.45   Hgb g/dL 12.6* 12.6*   Hct % 38.1* 37.5*   Platelet x10(3)/mcL 172 128*   Mono % % 9.6 4.5   Eos % % 1.2 3.4   Basophil % % 0.6 0.5       Metabolic Panel (last 72 hours):  Recent Labs   Lab Result Units 07/01/24  0420 07/02/24  0844 07/03/24  0711   Sodium mmol/L 135*  --  134*   Potassium mmol/L 4.5  --  4.1   Chloride mmol/L 102  --  102   CO2 mmol/L 28  --  24   Glucose mg/dL 221*  --  130*   Glucose, UA   --  1+*  --    Blood Urea Nitrogen mg/dL 25.7  --  20.7   Creatinine mg/dL 1.05  --  1.15   Albumin g/dL 3.4  --  3.3*   Bilirubin Total mg/dL 0.4  --  0.8   ALP unit/L 81  --  90   AST unit/L 35*  --  31   ALT unit/L 40  --  39   Magnesium Level mg/dL 2.10  --   --        Drug levels (last 3 results):  No results for input(s): "VANCOMYCINRA", "VANCORANDOM", "VANCOMYCINPE", "VANCOPEAK", "VANCOMYCINTR", " ""[facility]" in the last 72 hours.    Microbiologic Results:  Microbiology Results (last 7 days)       Procedure Component Value Units Date/Time    Blood Culture [[id number]]     Order Status: Sent Specimen: Blood     Blood Culture [[id number]]     Order Status: Sent Specimen: Blood     Blood Culture [[id number]]  (Abnormal) Collected: 07/02/24 0903    Order Status: Completed Specimen: Blood from Antecubital, Right Updated: 07/03/24 1154     GRAM STAIN Gram Positive Cocci, probable Staphylococcus      Seen in gram stain of broth only      1 of 1 Aerobic bottle positive    BCID2 Panel [[id number]] Collected: 07/02/24 0903    Order Status: Sent Specimen: Blood from Antecubital, Right Updated: 07/03/24 1128    Blood Culture [[id number]]  (Normal) Collected: 07/02/24 0903    Order Status: Completed Specimen: Blood from Antecubital, Left Updated: 07/03/24 1001     Blood Culture No Growth At 24 Hours    Urine culture [[id number]] Collected: 07/02/24 0844    Order Status: Completed Specimen: Urine, Clean Catch Updated: 07/03/24 0852     Urine Culture No Growth At 24 Hours            "

## 2024-07-04 VITALS
HEIGHT: 67 IN | TEMPERATURE: 98 F | HEART RATE: 77 BPM | DIASTOLIC BLOOD PRESSURE: 59 MMHG | WEIGHT: 169.56 LBS | BODY MASS INDEX: 26.61 KG/M2 | RESPIRATION RATE: 18 BRPM | SYSTOLIC BLOOD PRESSURE: 108 MMHG | OXYGEN SATURATION: 98 %

## 2024-07-04 LAB
ALBUMIN SERPL-MCNC: 3.4 G/DL (ref 3.4–4.8)
ALBUMIN/GLOB SERPL: 1.1 RATIO (ref 1.1–2)
ALP SERPL-CCNC: 116 UNIT/L (ref 40–150)
ALT SERPL-CCNC: 54 UNIT/L (ref 0–55)
ANION GAP SERPL CALC-SCNC: 8 MEQ/L
APICAL FOUR CHAMBER EJECTION FRACTION: 59 %
APICAL TWO CHAMBER EJECTION FRACTION: 58 %
AST SERPL-CCNC: 43 UNIT/L (ref 5–34)
BACTERIA UR CULT: NORMAL
BASOPHILS # BLD AUTO: 0.04 X10(3)/MCL
BASOPHILS NFR BLD AUTO: 0.7 %
BILIRUB SERPL-MCNC: 0.6 MG/DL
BSA FOR ECHO PROCEDURE: 1.89 M2
BUN SERPL-MCNC: 16.3 MG/DL (ref 8.4–25.7)
CALCIUM SERPL-MCNC: 8.8 MG/DL (ref 8.8–10)
CHLORIDE SERPL-SCNC: 103 MMOL/L (ref 98–107)
CO2 SERPL-SCNC: 25 MMOL/L (ref 23–31)
CREAT SERPL-MCNC: 1 MG/DL (ref 0.73–1.18)
CREAT/UREA NIT SERPL: 16
EOSINOPHIL # BLD AUTO: 0.08 X10(3)/MCL (ref 0–0.9)
EOSINOPHIL NFR BLD AUTO: 1.4 %
ERYTHROCYTE [DISTWIDTH] IN BLOOD BY AUTOMATED COUNT: 15.6 % (ref 11.5–17)
GFR SERPLBLD CREATININE-BSD FMLA CKD-EPI: >60 ML/MIN/1.73/M2
GLOBULIN SER-MCNC: 3.2 GM/DL (ref 2.4–3.5)
GLUCOSE SERPL-MCNC: 212 MG/DL (ref 82–115)
HCT VFR BLD AUTO: 36.1 % (ref 42–52)
HGB BLD-MCNC: 12.1 G/DL (ref 14–18)
IMM GRANULOCYTES # BLD AUTO: 0.03 X10(3)/MCL (ref 0–0.04)
IMM GRANULOCYTES NFR BLD AUTO: 0.5 %
LEFT VENTRICLE DIASTOLIC VOLUME INDEX: 52.61 ML/M2
LEFT VENTRICLE DIASTOLIC VOLUME: 98.9 ML
LEFT VENTRICLE END DIASTOLIC VOLUME APICAL 2 CHAMBER: 83.7 ML
LEFT VENTRICLE END DIASTOLIC VOLUME APICAL 4 CHAMBER: 101 ML
LEFT VENTRICLE SYSTOLIC VOLUME INDEX: 22 ML/M2
LEFT VENTRICLE SYSTOLIC VOLUME: 41.4 ML
LYMPHOCYTES # BLD AUTO: 0.78 X10(3)/MCL (ref 0.6–4.6)
LYMPHOCYTES NFR BLD AUTO: 13.3 %
MAGNESIUM SERPL-MCNC: 2.1 MG/DL (ref 1.6–2.6)
MCH RBC QN AUTO: 28.2 PG (ref 27–31)
MCHC RBC AUTO-ENTMCNC: 33.5 G/DL (ref 33–36)
MCV RBC AUTO: 84.1 FL (ref 80–94)
MONOCYTES # BLD AUTO: 0.48 X10(3)/MCL (ref 0.1–1.3)
MONOCYTES NFR BLD AUTO: 8.2 %
NEUTROPHILS # BLD AUTO: 4.46 X10(3)/MCL (ref 2.1–9.2)
NEUTROPHILS NFR BLD AUTO: 75.9 %
NRBC BLD AUTO-RTO: 0 %
OHS LV EJECTION FRACTION SIMPSONS BIPLANE MOD: 58 %
PLATELET # BLD AUTO: 147 X10(3)/MCL (ref 130–400)
PMV BLD AUTO: 10.7 FL (ref 7.4–10.4)
POTASSIUM SERPL-SCNC: 4.1 MMOL/L (ref 3.5–5.1)
PROT SERPL-MCNC: 6.6 GM/DL (ref 5.8–7.6)
RBC # BLD AUTO: 4.29 X10(6)/MCL (ref 4.7–6.1)
SODIUM SERPL-SCNC: 136 MMOL/L (ref 136–145)
WBC # BLD AUTO: 5.87 X10(3)/MCL (ref 4.5–11.5)

## 2024-07-04 PROCEDURE — 85025 COMPLETE CBC W/AUTO DIFF WBC: CPT | Performed by: INTERNAL MEDICINE

## 2024-07-04 PROCEDURE — 36415 COLL VENOUS BLD VENIPUNCTURE: CPT | Performed by: INTERNAL MEDICINE

## 2024-07-04 PROCEDURE — 25000003 PHARM REV CODE 250: Performed by: NURSE PRACTITIONER

## 2024-07-04 PROCEDURE — 80053 COMPREHEN METABOLIC PANEL: CPT | Performed by: INTERNAL MEDICINE

## 2024-07-04 PROCEDURE — 83735 ASSAY OF MAGNESIUM: CPT

## 2024-07-04 PROCEDURE — 63600175 PHARM REV CODE 636 W HCPCS: Performed by: INTERNAL MEDICINE

## 2024-07-04 PROCEDURE — 25000003 PHARM REV CODE 250: Performed by: STUDENT IN AN ORGANIZED HEALTH CARE EDUCATION/TRAINING PROGRAM

## 2024-07-04 PROCEDURE — 25000003 PHARM REV CODE 250: Performed by: EMERGENCY MEDICINE

## 2024-07-04 PROCEDURE — 25000003 PHARM REV CODE 250: Performed by: INTERNAL MEDICINE

## 2024-07-04 RX ORDER — CEFAZOLIN SODIUM 2 G/50ML
2 SOLUTION INTRAVENOUS
Status: DISCONTINUED | OUTPATIENT
Start: 2024-07-04 | End: 2024-07-04 | Stop reason: HOSPADM

## 2024-07-04 RX ADMIN — HYDROCODONE BITARTRATE AND ACETAMINOPHEN 1 TABLET: 5; 325 TABLET ORAL at 08:07

## 2024-07-04 RX ADMIN — CEFAZOLIN SODIUM 2 G: 2 SOLUTION INTRAVENOUS at 10:07

## 2024-07-04 RX ADMIN — METOPROLOL SUCCINATE 12.5 MG: 25 TABLET, EXTENDED RELEASE ORAL at 08:07

## 2024-07-04 RX ADMIN — TICAGRELOR 90 MG: 90 TABLET ORAL at 08:07

## 2024-07-04 RX ADMIN — CEFEPIME 1 G: 1 INJECTION, POWDER, FOR SOLUTION INTRAMUSCULAR; INTRAVENOUS at 05:07

## 2024-07-04 RX ADMIN — VALSARTAN 40 MG: 40 TABLET, FILM COATED ORAL at 08:07

## 2024-07-04 RX ADMIN — FAMOTIDINE 20 MG: 20 TABLET, FILM COATED ORAL at 08:07

## 2024-07-04 RX ADMIN — ASPIRIN 81 MG: 81 TABLET, COATED ORAL at 08:07

## 2024-07-04 NOTE — PROGRESS NOTES
" [facility] - 6th Floor Medical Telemetry    Cardiology  Progress Note    Patient Name: Jb Abbott  MRN: [id number]  Admission Date: 6/30/2024  Hospital Length of Stay: 3 days  Code Status: Full Code   Attending Physician: Tunde Cole MD   Primary Care Physician: Greer, Primary Doctor  Expected Discharge Date:   Principal Problem:NSTEMI (non-ST elevated myocardial infarction)    Subjective:   Chief Complaint:  Chest Pain     HPI:   Mr. Abbott is a 64 year old male, unknown to [facility] ([facility] in [place]), who presented to the hospital as transfer from [place] via EMS. Transferred to [facility] for cardiac care in the setting of NSTEMI. Presented to outlying facility with CP. Described as "pressure" with associated SOB. Also reported nausea. Reported prior history of CAD and prior stents. Patient loaded with Aspirin and started on Heparin Infusion for ACS Treatment. He is admitted to [facility] for NSTEMI Treatment.    Hospital Course:   7.1.24: NAD Noted. Denies CP/SOB. SR on Tele. Labs Stable.    7.2.24: NAD Noted. Denies CP/SOB. SR on Tele. Febrile.   7.3.24: NAD. VSS. Fevers Noted. St noted with fevers. No complaints of CP/SOB/Palps. "I feel okay"  7.4.24: NAD. VSS. No fevers overnight. Blood culture positive yesterday, discharge was cancelled. No complaints of CP/SOB/Palps. "I feel okay"     PMH: Chronic Pain, CAD, DM, GERD, Hyperlipidemia, Hypertension  PSH: Appendectomy, LHC/PCI  Family History: Father- Hypertension/Heart Disease/DM, Mother- Hypertension/Stroke/DM  Social History: Tobacco- Active Smoker, Alcohol- Negative, Substance abuse- Negative     Previous Cardiac Diagnostics:   Echocardiogram (6.30.24):  Left Ventricle: The left ventricle is normal in size. Normal wall thickness. Normal wall motion. The basal inferior wall has brighter scaring and slightly aneurysmal, appears consistent with old MI. There is normal systolic function with a visually estimated ejection fraction of 55 " - 60%. Grade I diastolic dysfunction.  Right Ventricle: Normal right ventricular cavity size. Systolic function is normal.  Aortic Valve: The aortic valve is a trileaflet valve.  Mitral Valve: The mitral valve is structurally normal. There is mild regurgitation.    [facility] with PCI (6.30.24):  Findings:  There is severe coronary artery disease.  Mid Left Circumflex has a 50% stenosis.  Proximal Right Coronary Artery had a 50-60% stenosis. Mid Right Coronary Artery was 100% occluded. The mid RCA lesion was thrombotic, and the culprit lesion. There were faint left-to-right collaterals from the septal perforating branches of the LAD. This was the culprit lesion. The lesions were successfully treated with overlapping SYNERGY XD 4.0x38MM and 3.5x32MM drug-eluting stents. Post-dilatation was performed using an NC EUPHORA 4.0x27MM angioplasty balloon inflated to nominal pressure. Following intervention there was 0% residual stenosis and KAREN grade 3 flow in the distal vessel.  Intravascular Ultrasound (IVUS) was performed prior to intervention to further characterize the lesion and measure the vessel for PCI.  LVEDP is 25 mmHg.    Review of Systems   Cardiovascular:  Negative for chest pain.   Respiratory:  Negative for shortness of breath.    All other systems reviewed and are negative.    Objective:     Vital Signs (Most Recent):  Temp: 98.2 °F (36.8 °C) (07/04/24 0800)  Pulse: 93 (07/04/24 0800)  Resp: 18 (07/04/24 0844)  BP: 117/70 (07/04/24 0800)  SpO2: 97 % (07/04/24 0800) Vital Signs (24h Range):  Temp:  [97.7 °F (36.5 °C)-98.3 °F (36.8 °C)] 98.2 °F (36.8 °C)  Pulse:  [] 93  Resp:  [18] 18  SpO2:  [97 %-98 %] 97 %  BP: (109-129)/(63-70) 117/70   Weight: 76.9 kg (169 lb 8.5 oz)  Body mass index is 26.61 kg/m².  SpO2: 97 %       Intake/Output Summary (Last 24 hours) at 7/4/2024 0978  Last data filed at 7/3/2024 2122  Gross per 24 hour   Intake 1740 ml   Output --   Net 1740 ml     Lines/Drains/Airways        Peripheral Intravenous Line  Duration                  Peripheral IV - Single Lumen 18 G Posterior;Right Forearm -- days                  Significant Labs:   Recent Results (from the past 72 hour(s))   POCT glucose    Collection Time: 07/01/24 10:46 AM   Result Value Ref Range    POCT Glucose 204 (H) 70 - 110 mg/dL   POCT glucose    Collection Time: 07/01/24  8:49 PM   Result Value Ref Range    POCT Glucose 212 (H) 70 - 110 mg/dL   POCT glucose    Collection Time: 07/02/24  5:35 AM   Result Value Ref Range    POCT Glucose 221 (H) 70 - 110 mg/dL   Urinalysis    Collection Time: 07/02/24  8:44 AM   Result Value Ref Range    Color, UA Yellow Yellow, Light-Yellow, Colorless, Straw, Dark-Yellow    Appearance, UA Clear Clear    Specific Gravity, UA 1.034 (H) 1.005 - 1.030    pH, UA 5.5 5.0 - 8.5    Protein, UA 1+ (A) Negative    Glucose, UA 1+ (A) Negative, Normal    Ketones, UA Trace (A) Negative    Blood, UA 2+ (A) Negative    Bilirubin, UA Negative Negative    Urobilinogen, UA 2.0 (A) 0.2, 1.0, Normal    Nitrites, UA Negative Negative    Leukocyte Esterase, UA 25 (A) Negative    RBC, UA 50-99 (A) None Seen, 0-2, 3-5, 0-5 /HPF    WBC, UA 11-20 (A) None Seen, 0-2, 3-5, 0-5 /HPF    Bacteria, UA None Seen None Seen, Trace /HPF    Squamous Epithelial Cells, UA Trace None Seen /HPF    Mucous, UA Few (A) None Seen /LPF    Hyaline Casts, UA 3-5 (A) None Seen /lpf   Urine culture    Collection Time: 07/02/24  8:44 AM    Specimen: Urine, Clean Catch   Result Value Ref Range    Urine Culture No Significant Growth    Blood Culture    Collection Time: 07/02/24  9:03 AM    Specimen: Antecubital, Right; Blood   Result Value Ref Range    GRAM STAIN Gram Positive Cocci, probable Staphylococcus (AA)     GRAM STAIN Seen in gram stain of broth only (AA)     GRAM STAIN 1 of 1 Aerobic bottle positive (AA)    Blood Culture    Collection Time: 07/02/24  9:03 AM    Specimen: Antecubital, Left; Blood   Result Value Ref Range    Blood Culture No  Growth At 24 Hours    BCID2 Panel    Collection Time: 07/02/24  9:03 AM    Specimen: Antecubital, Right; Blood   Result Value Ref Range    CTX-M (ESBL ) N/A Not Detected, N/A    IMP (Cabapenemase ) N/A Not Detected, N/A    KPC resistance gene (Carbapenemase ) N/A Not Detected, N/A    mcr-1 N/A Not Detected, N/A    mecA ID N/A Not Detected, N/A    mecA/C and MREJ (MRSA) gene Not Detected Not Detected, N/A    NDM (Carbapenemase ) N/A Not Detected, N/A    OXA-48-like (Carbapenemase ) N/A Not Detected, N/A    Siri/B (VRE gene) N/A Not Detected, N/A    VIM (Carbapenemase ) N/A Not Detected, N/A    Enterococcus faecalis Not Detected Not Detected    Enterococcus faecium Not Detected Not Detected    Listeria monocytogenes Not Detected Not Detected    Staphylococcus spp. Detected (A) Not Detected    Staphylococcus aureus Detected (A) Not Detected    Staphylococcus epidermidis Not Detected Not Detected    Staphylococcus lugdunensis Not Detected Not Detected    Streptococcus spp. Not Detected Not Detected    Streptococcus agalactiae (Group B) Not Detected Not Detected    Streptococcus pneumoniae Not Detected Not Detected    Streptococcus pyogenes (Group A) Not Detected Not Detected    Acinetobacter calcoaceticus/baumannii complex Not Detected Not Detected    Bacteroides fragilis Not Detected Not Detected    Enterobacterales Not Detected Not Detected    Enterobacter cloacae complex Not Detected Not Detected    Escherichia coli Not Detected Not Detected    Klebsiella aerogenes Not Detected Not Detected    Klebsiella oxytoca Not Detected Not Detected    Klebsiella pneumoniae group Not Detected Not Detected    Proteus spp. Not Detected Not Detected    Salmonella spp. Not Detected Not Detected    Serratia marcescens Not Detected Not Detected    Haemophilus influenzae Not Detected Not Detected    Neisseria meningitidis Not Detected Not Detected    Pseudomonas aeruginosa Not Detected  Not Detected    Stenotrophomonas maltophilia Not Detected Not Detected    Candida albicans Not Detected Not Detected    Candida auris Not Detected Not Detected    Candida glabrata Not Detected Not Detected    Candida krusei Not Detected Not Detected    Candida parapsilosis Not Detected Not Detected    Candida tropicalis Not Detected Not Detected    Cryptococcus neoformans/gattii Not Detected Not Detected   POCT glucose    Collection Time: 07/02/24 10:20 AM   Result Value Ref Range    POCT Glucose 166 (H) 70 - 110 mg/dL   POCT glucose    Collection Time: 07/02/24  4:23 PM   Result Value Ref Range    POCT Glucose 200 (H) 70 - 110 mg/dL   POCT glucose    Collection Time: 07/02/24  8:43 PM   Result Value Ref Range    POCT Glucose 157 (H) 70 - 110 mg/dL   POCT glucose    Collection Time: 07/03/24  4:16 AM   Result Value Ref Range    POCT Glucose 174 (H) 70 - 110 mg/dL   Comprehensive Metabolic Panel    Collection Time: 07/03/24  7:11 AM   Result Value Ref Range    Sodium 134 (L) 136 - 145 mmol/L    Potassium 4.1 3.5 - 5.1 mmol/L    Chloride 102 98 - 107 mmol/L    CO2 24 23 - 31 mmol/L    Glucose 130 (H) 82 - 115 mg/dL    Blood Urea Nitrogen 20.7 8.4 - 25.7 mg/dL    Creatinine 1.15 0.73 - 1.18 mg/dL    Calcium 9.2 8.8 - 10.0 mg/dL    Protein Total 6.8 5.8 - 7.6 gm/dL    Albumin 3.3 (L) 3.4 - 4.8 g/dL    Globulin 3.5 2.4 - 3.5 gm/dL    Albumin/Globulin Ratio 0.9 (L) 1.1 - 2.0 ratio    Bilirubin Total 0.8 <=1.5 mg/dL    ALP 90 40 - 150 unit/L    ALT 39 0 - 55 unit/L    AST 31 5 - 34 unit/L    eGFR >60 mL/min/1.73/m2    Anion Gap 8.0 mEq/L    BUN/Creatinine Ratio 18    CBC with Differential    Collection Time: 07/03/24  7:11 AM   Result Value Ref Range    WBC 6.45 4.50 - 11.50 x10(3)/mcL    RBC 4.45 (L) 4.70 - 6.10 x10(6)/mcL    Hgb 12.6 (L) 14.0 - 18.0 g/dL    Hct 37.5 (L) 42.0 - 52.0 %    MCV 84.3 80.0 - 94.0 fL    MCH 28.3 27.0 - 31.0 pg    MCHC 33.6 33.0 - 36.0 g/dL    RDW 15.5 11.5 - 17.0 %    Platelet 128 (L) 130 - 400  x10(3)/mcL    MPV 10.4 7.4 - 10.4 fL    Neut % 82.6 %    Lymph % 8.4 %    Mono % 4.5 %    Eos % 3.4 %    Basophil % 0.5 %    Lymph # 0.54 (L) 0.6 - 4.6 x10(3)/mcL    Neut # 5.33 2.1 - 9.2 x10(3)/mcL    Mono # 0.29 0.1 - 1.3 x10(3)/mcL    Eos # 0.22 0 - 0.9 x10(3)/mcL    Baso # 0.03 <=0.2 x10(3)/mcL    IG# 0.04 0 - 0.04 x10(3)/mcL    IG% 0.6 %    NRBC% 0.0 %    IPF 4.3 0.9 - 11.2 %   POCT glucose    Collection Time: 07/03/24  5:00 PM   Result Value Ref Range    POCT Glucose 170 (H) 70 - 110 mg/dL   POCT glucose    Collection Time: 07/03/24  8:46 PM   Result Value Ref Range    POCT Glucose 211 (H) 70 - 110 mg/dL   Comprehensive Metabolic Panel    Collection Time: 07/04/24  4:01 AM   Result Value Ref Range    Sodium 136 136 - 145 mmol/L    Potassium 4.1 3.5 - 5.1 mmol/L    Chloride 103 98 - 107 mmol/L    CO2 25 23 - 31 mmol/L    Glucose 212 (H) 82 - 115 mg/dL    Blood Urea Nitrogen 16.3 8.4 - 25.7 mg/dL    Creatinine 1.00 0.73 - 1.18 mg/dL    Calcium 8.8 8.8 - 10.0 mg/dL    Protein Total 6.6 5.8 - 7.6 gm/dL    Albumin 3.4 3.4 - 4.8 g/dL    Globulin 3.2 2.4 - 3.5 gm/dL    Albumin/Globulin Ratio 1.1 1.1 - 2.0 ratio    Bilirubin Total 0.6 <=1.5 mg/dL     40 - 150 unit/L    ALT 54 0 - 55 unit/L    AST 43 (H) 5 - 34 unit/L    eGFR >60 mL/min/1.73/m2    Anion Gap 8.0 mEq/L    BUN/Creatinine Ratio 16    Magnesium    Collection Time: 07/04/24  4:01 AM   Result Value Ref Range    Magnesium Level 2.10 1.60 - 2.60 mg/dL   CBC with Differential    Collection Time: 07/04/24  4:01 AM   Result Value Ref Range    WBC 5.87 4.50 - 11.50 x10(3)/mcL    RBC 4.29 (L) 4.70 - 6.10 x10(6)/mcL    Hgb 12.1 (L) 14.0 - 18.0 g/dL    Hct 36.1 (L) 42.0 - 52.0 %    MCV 84.1 80.0 - 94.0 fL    MCH 28.2 27.0 - 31.0 pg    MCHC 33.5 33.0 - 36.0 g/dL    RDW 15.6 11.5 - 17.0 %    Platelet 147 130 - 400 x10(3)/mcL    MPV 10.7 (H) 7.4 - 10.4 fL    Neut % 75.9 %    Lymph % 13.3 %    Mono % 8.2 %    Eos % 1.4 %    Basophil % 0.7 %    Lymph # 0.78 0.6 -  4.6 x10(3)/mcL    Neut # 4.46 2.1 - 9.2 x10(3)/mcL    Mono # 0.48 0.1 - 1.3 x10(3)/mcL    Eos # 0.08 0 - 0.9 x10(3)/mcL    Baso # 0.04 <=0.2 x10(3)/mcL    IG# 0.03 0 - 0.04 x10(3)/mcL    IG% 0.5 %    NRBC% 0.0 %     EKG:      Telemetry:  Sinus Rhythm    Physical Exam  Vitals and nursing note reviewed.   Constitutional:       Appearance: Normal appearance.   HENT:      Head: Normocephalic.      Mouth/Throat:      Mouth: Mucous membranes are moist.      Pharynx: Oropharynx is clear.   Cardiovascular:      Rate and Rhythm: Normal rate and regular rhythm.      Pulses:           Radial pulses are 2+ on the right side.      Heart sounds: Normal heart sounds.      Comments: Right Radial Cath Site Soft with no evidence of active bleed or hematoma.   Pulmonary:      Effort: Pulmonary effort is normal. No respiratory distress.      Breath sounds: Normal breath sounds. No wheezing or rales.   Abdominal:      General: There is no distension.      Palpations: Abdomen is soft.      Tenderness: There is no abdominal tenderness. There is no guarding.   Musculoskeletal:         General: Normal range of motion.      Cervical back: Neck supple.   Skin:     General: Skin is warm and dry.   Neurological:      General: No focal deficit present.      Mental Status: He is alert and oriented to person, place, and time. Mental status is at baseline.   Psychiatric:         Mood and Affect: Mood normal.         Behavior: Behavior normal.       Current Inpatient Medications:    Current Facility-Administered Medications:     acetaminophen tablet 650 mg, 650 mg, Oral, Q8H PRN, Gloria Hamilton MD, 650 mg at 07/03/24 0418    aspirin EC tablet 81 mg, 81 mg, Oral, Daily, Venkatesh Bolaños FNP, 81 mg at 07/04/24 0844    cefazolin (ANCEF) 2 gram in dextrose 5% 50 mL IVPB (premix), 2 g, Intravenous, Q8H, Felton Daley MD    dextrose 10% bolus 125 mL 125 mL, 12.5 g, Intravenous, PRN, Richie Chi NP    dextrose 10% bolus 250 mL 250 mL, 25  g, Intravenous, PRN, Richie Chi, NP    ezetimibe tablet 10 mg, 10 mg, Oral, QHS, Venkatesh Bolaños, MARYP, 10 mg at 07/03/24 2051    famotidine tablet 20 mg, 20 mg, Oral, BID, Gloria Hamilton MD, 20 mg at 07/04/24 0844    glucagon (human recombinant) injection 1 mg, 1 mg, Intramuscular, PRN, Richie Chi NP    glucose chewable tablet 16 g, 16 g, Oral, PRN, Richie Chi, NP    glucose chewable tablet 24 g, 24 g, Oral, PRN, Richie Chi NP    HYDROcodone-acetaminophen 5-325 mg per tablet 1 tablet, 1 tablet, Oral, Q6H PRN, Richie Chi, NP, 1 tablet at 07/04/24 0844    ibuprofen tablet 400 mg, 400 mg, Oral, Q6H PRN, Annalisa Becker MD, 400 mg at 07/02/24 1613    insulin aspart U-100 injection 0-5 Units, 0-5 Units, Subcutaneous, QID (AC + HS) PRN, Richie Chi NP, 1 Units at 07/03/24 2052    melatonin tablet 6 mg, 6 mg, Oral, Nightly PRN, Gloria Hamilton MD    metoprolol succinate (TOPROL-XL) 24 hr split tablet 12.5 mg, 12.5 mg, Oral, Daily, Venkatesh Bolaños, MARYP, 12.5 mg at 07/04/24 0844    morphine injection 2 mg, 2 mg, Intravenous, Q4H PRN, Gloria Hamilton MD    morphine injection 4 mg, 4 mg, Intravenous, Q4H PRN, Gloria Hamilton MD, 4 mg at 06/30/24 0320    nicotine 14 mg/24 hr 1 patch, 1 patch, Transdermal, Daily, Venkatesh Bolaños, MARYP, 1 patch at 07/03/24 0859    nitroGLYCERIN SL tablet 0.4 mg, 0.4 mg, Sublingual, Q5 Min PRN, Venkatesh Bolaños, MARYP    ondansetron injection 4 mg, 4 mg, Intravenous, Q8H PRN, Gloria Hamilton MD, 4 mg at 06/30/24 0320    sodium chloride 0.9% flush 10 mL, 10 mL, Intravenous, PRN, Gloria Hamilton MD    ticagrelor tablet 90 mg, 90 mg, Oral, BID, Macario Mishra Jr., MD, 90 mg at 07/04/24 0844    valsartan tablet 40 mg, 40 mg, Oral, Daily, Venkatesh Bolaños FNP, 40 mg at 07/04/24 0844  VTE Risk Mitigation (From admission, onward)           Ordered     IP VTE LOW RISK PATIENT  Once         06/30/24 7040      Place sequential compression device  Until discontinued         06/30/24 0304                  Assessment:   NSTEMI- Type I (Non Anterior Wall)  CAD    - Status Post PCI/Stenting Mid RCA (Left System Patent) (6.30.24)    - EF 55-60%  Possible UTI    - Mild nonspecific perinephric stranding bilaterally on CT (7.3.24)  Fever  Hepatic Steatosis  Cholelithiasis  Hyponatremia   Hypertension (BP Stable)  Hyperlipidemia    - Rosuvastatin Allergy  Thrombocytopenia   Leukocytosis    - Afebrile   Anemia (Stable)  DM II    - A1C 7.4%  Nicotine Dependence (Active Smoker)  Chronic Pain (Stable)  No known History of GI Bleed    Plan:   Will await Blood Cultures prior to Discharge    Continue DAPT- Brilinta 90 Mg PO BID & Aspirin 81 Mg Daily (Re: RCA Stenting 6.30.24)  Continue Toprol XL 12.5 Mg PO Daily & Valsartan 40 Mg PO Daily  Protonix at discharge for gastric protection while on DAPT. For now Pepcid is okay.   No Statin due to Allergy   Continue Zetia 10 mg PO Daily. Consider PCSK 9 Inhibitor on outpatient basis if demonstrates compliance through follow up  Nitro SL at discharge  Complete Smoking Cessation Strongly Advised. Will provide Nicotine Patch.  Right Radial Precautions/Activity Restrictions Discussed.  IV Antibiotics per  Medicine Team   Keep Mag > 2 and Potassium > 4   Labs in AM: CBC, BMP, and Mag     MARIA A Quezada  Cardiology  [facility] - 6th Floor Medical Telemetry  07/04/2024     Pt seen and examined by me, Elvie Méndez MD. I have reviewed the NP's note, assessment and plan. I have personally interviewed and examined the patient at bedside and agree with the findings. Medical decision making listed above were done under my guidance.     Physical exam:  NAD  Cardiovascular system: regular rhythm, no murmur.  Lungs: CTAB.  Abd: S/ST/ND  Extremities: No leg edema.      Assessment and Plan:  Did with inferior treated with DAVID and developed some fevers and rigors during hospitalization.   Found to have UTI with MSSA and HM changed to ancef. ID consulted. Pt is stable for DC from cardiac standpoint and will DC once okay with HM/ID    Continue DA PT and follow-up with Dr. Mishra as outpatient.  Patient with statin intolerance and starting Zetia.  Would PCSK9 as outpatient.    Ilene ID recommendation  Continue AB as recommended by hospital medicine and/or ID

## 2024-07-04 NOTE — PROGRESS NOTES
[facility] Hospital Medicine Progress Note        Chief Complaint: Inpatient Follow-up    HPI:   64-year-old male with significant history of CAD, type 2 diabetes mellitus, GERD, HTN, HLD, chronic pain was admitted to Cardiology Services as a transfer from outlying facility for higher level of care for NSTEMI.  [facility], dyspnea.  Does have history of CAD requiring stenting in the past.  Patient was loaded with aspirin, heparin infusion and admitted to Cardiology Services.  Echocardiogram with normal wall motion and normal ejection fraction, grade 1 diastolic dysfunction.  Chest x-ray, labs stable except for leukocytosis, hyperglycemia and significantly elevated troponins.  Patient underwent LHC with stenting to mid RCA.  Now on dual antiplatelets-aspirin, Brilinta and other goal-directed medical treatment including Toprol-XL, valsartan.  Patient developed high-grade temp spikes with chills.  Hospital medicine services consulted by Cardiology for further evaluation.  Chest x-ray repeated-no pneumonia.  UA concerning for UTI with pyuria, hematuria and leukocyte esterase.  Blood cultures and urine cultures also obtained.  Initiated cefepime pending cultures    Interval Hx:   Seen this morning, had to make 3 rounds because he was gone out of the room this morning.  Apparently has hospital priveledges.  Needs to be addressedd to be specific times of the day and for minutes at a time.    Refusing to stay for the antibioitcs when discuess bactgeremia.   Wife in the room       Objective/physical exam:  General: In no acute distress, afebrile  Chest: Clear to auscultation bilaterally  Heart: S1, S2, no appreciable murmur  Abdomen: Soft, nontender, BS +  MSK: Warm, no lower extremity edema, no clubbing or cyanosis  Neurologic: Alert and oriented x4, moving all extremities with good strength     VITAL SIGNS: 24 HRS MIN & MAX LAST   Temp  Min: 97.7 °F  (36.5 °C)  Max: 98.3 °F (36.8 °C) 98.2 °F (36.8 °C)   BP  Min: 109/67  Max: 129/63 117/70   Pulse  Min: 85  Max: 103  93   Resp  Min: 18  Max: 18 18   SpO2  Min: 97 %  Max: 98 % 97 %       Recent Labs   Lab 07/04/24  0401   WBC 5.87   RBC 4.29*   HGB 12.1*   HCT 36.1*   MCV 84.1   MCH 28.2   MCHC 33.5   RDW 15.6      MPV 10.7*         Recent Labs   Lab 07/04/24  0401      K 4.1      CO2 25   BUN 16.3   CREATININE 1.00   CALCIUM 8.8   MG 2.10   ALBUMIN 3.4   ALKPHOS 116   ALT 54   AST 43*   BILITOT 0.6          Microbiology Results (last 7 days)       Procedure Component Value Units Date/Time    Blood Culture [[id number]]  (Normal) Collected: 07/02/24 0903    Order Status: Completed Specimen: Blood from Antecubital, Left Updated: 07/04/24 1000     Blood Culture No Growth At 48 Hours    Urine culture [[id number]] Collected: 07/02/24 0844    Order Status: Completed Specimen: Urine, Clean Catch Updated: 07/04/24 0924     Urine Culture No Significant Growth    Blood Culture [[id number]] Collected: 07/03/24 1223    Order Status: Resulted Specimen: Blood Updated: 07/03/24 1311    Blood Culture [[id number]] Collected: 07/03/24 1223    Order Status: Resulted Specimen: Blood Updated: 07/03/24 1311    BCID2 Panel [[id number]]  (Abnormal) Collected: 07/02/24 0903    Order Status: Completed Specimen: Blood from Antecubital, Right Updated: 07/03/24 1304     CTX-M (ESBL ) N/A     IMP (Cabapenemase ) N/A     KPC resistance gene (Carbapenemase ) N/A     mcr-1 N/A     mecA ID N/A     Comment: Note: Antimicrobial resistance can occur via multiple mechanisms. A Not Detected result for antimicrobial resistance gene(s) does not indicate antimicrobial susceptibility. Subculturing is required for species identification and susceptibility testing of   isolates.        mecA/C and MREJ (MRSA) gene Not Detected     NDM (Carbapenemase ) N/A     OXA-48-like (Carbapenemase ) N/A      Siri/B (VRE gene) N/A     VIM (Carbapenemase ) N/A     Enterococcus faecalis Not Detected     Enterococcus faecium Not Detected     Listeria monocytogenes Not Detected     Staphylococcus spp. Detected     Staphylococcus aureus Detected     Staphylococcus epidermidis Not Detected     Staphylococcus lugdunensis Not Detected     Streptococcus spp. Not Detected     Streptococcus agalactiae (Group B) Not Detected     Streptococcus pneumoniae Not Detected     Streptococcus pyogenes (Group A) Not Detected     Acinetobacter calcoaceticus/baumannii complex Not Detected     Bacteroides fragilis Not Detected     Enterobacterales Not Detected     Enterobacter cloacae complex Not Detected     Escherichia coli Not Detected     Klebsiella aerogenes Not Detected     Klebsiella oxytoca Not Detected     Klebsiella pneumoniae group Not Detected     Proteus spp. Not Detected     Salmonella spp. Not Detected     Serratia marcescens Not Detected     Haemophilus influenzae Not Detected     Neisseria meningitidis Not Detected     Pseudomonas aeruginosa Not Detected     Stenotrophomonas maltophilia Not Detected     Candida albicans Not Detected     Candida auris Not Detected     Candida glabrata Not Detected     Candida krusei Not Detected     Candida parapsilosis Not Detected     Candida tropicalis Not Detected     Cryptococcus neoformans/gattii Not Detected    Narrative:      The [employer] BCID2 Panel is a multiplexed nucleic acid test intended for the use with [employer]® 2.0 or Instagarage® [employer] for the simultaneous qualitative detection and identification of multiple bacterial and yeast nucleic acids and select genetic determinants associated with antimicrobial resistance.  The [employer] BCID2 Panel test is performed directly on blood culture samples identified as positive by a continuous monitoring blood culture system.  Results are intended to be interpreted in conjunction with Gram stain results.    Blood  Culture [[id number]]  (Abnormal) Collected: 07/02/24 0903    Order Status: Completed Specimen: Blood from Antecubital, Right Updated: 07/03/24 1154     GRAM STAIN Gram Positive Cocci, probable Staphylococcus      Seen in gram stain of broth only      1 of 1 Aerobic bottle positive             Scheduled Med:   aspirin  81 mg Oral Daily    ceFAZolin (Ancef) IV (PEDS and ADULTS)  2 g Intravenous Q8H    ezetimibe  10 mg Oral QHS    famotidine  20 mg Oral BID    metoprolol succinate  12.5 mg Oral Daily    nicotine  1 patch Transdermal Daily    ticagrelor  90 mg Oral BID    valsartan  40 mg Oral Daily          Assessment/Plan:    Staph aureus bacteremia  Suspected acute bacterial UTI   Sepsis secondary to above   NSTEMI-type 1 status post PCI to RCA  CAD   Chronic diastolic heart failure-appears compensated  Essential HTN-stable   HLD   Type 2 diabetes mellitus with A1c-7.4%   Chronic tobacco use   Chronic pain   Prophylaxis     ID consulted for bactermia of mssa.  Williebe to ancef.  He doesn't want to stay.   GDM T per Cardiology   Patient is on aspirin, Brilinta, valsartan, Toprol-XL  Sliding scale for diabetes mellitus  Home medications not reconciled   Can possibly DC on metformin with low-dose oral sulfonylurea  Continue nicotine patch  On Pepcid for GI protection   DVT prophylaxis-patient is ambulating well in the room        Felton Daley MD   07/04/2024

## 2024-07-04 NOTE — CONSULTS
INFECTIOUS DISEASE CONSULTATION NOTE     Patient Name: Jb Abbott  Patient : 1960  Age/Sex: 64 y.o. male  Room/Bed: 618/618 A  Admission Date/Time: 2024  2:41 AM  Date of consultation:  2024  Referring MD: Tunde Cole MD     Date: 2024  Time: 7:12 AM    Reason for consultation:      MSSA bacteremia    Chief complain:      Chest pain    History of present illness:      Jb Abbott is a 64 y.o. male with a history significant for CAD, type 2 diabetes, GERD, dyslipidemia, chronic pain with multiple hardware, hypertension who was admitted on 2024 as a transfer from outside hospital for further cardiac workup after he found having non STEMI. In the ED, patient's vital signs showed temperature 98.1° F, heart rate 71, blood pressure 165/89. Laboratory workup showed AST 63, troponin 12, WBC 90752. Urinalysis showed more than 100 RBC, no WBC. Chest x-ray showed no clear infiltration. He was taken to the cath lab  and found to have 100% occlusion of right coronary artery status post stent placement.  Patient has started spiking fever 2024.  He had 2 sets of blood culture obtained  with 1 set growing MSSA.  He would CT chest/abdomen/pelvis 7/3 which showed hepatic steatosis and cholelithiasis.  Patient has been receiving IV antibiotic as below    Review of system:      A review of the patient's history and complaints did not reveal any medical problems other than those outlined in the HPI. Specifically, there were no additional constitutional complaints, and no complaints of problems with the eyes, ears, nose, and mouth, cardiovascular, respiratory, gastrointestinal, musculoskeletal, neurological, integumentary, psychiatric, endocrine, or hematological systems.    Antibiotics Received:     Cefepime -  Vancomycin 7/3   Cefazolin -    Social history:      Social History     Socioeconomic History    Marital status:    Tobacco Use    Smoking status:  Every Day     Types: Cigarettes    Smokeless tobacco: Never   Substance and Sexual Activity    Alcohol use: Not Currently    Drug use: Never     Social Determinants of Health     Financial Resource Strain: Patient Declined (7/2/2024)    Overall Financial Resource Strain (CARDIA)     Difficulty of Paying Living Expenses: Patient declined   Food Insecurity: Patient Declined (7/2/2024)    Hunger Vital Sign     Worried About Running Out of Food in the Last Year: Patient declined     Ran Out of Food in the Last Year: Patient declined   Transportation Needs: Patient Declined (7/2/2024)    TRANSPORTATION NEEDS     Transportation : Patient declined   Stress: Patient Declined (7/2/2024)    [place] [employer] - Occupational Stress Questionnaire     Feeling of Stress : Patient declined   Housing Stability: Patient Declined (7/2/2024)    Housing Stability Vital Sign     Unable to Pay for Housing in the Last Year: Patient declined     Homeless in the Last Year: Patient declined       Past medical history:     Past Medical History:   Diagnosis Date    Chronic pain     Coronary artery disease     Diabetes mellitus     GERD (gastroesophageal reflux disease)     Hypercholesterolemia     Hypertension     NSTEMI (non-ST elevated myocardial infarction) 07/03/2024       Past Surgical history:     Past Surgical History:   Procedure Laterality Date    APPENDECTOMY      CORONARY ANGIOPLASTY WITH STENT PLACEMENT      X4    LEFT HEART CATHETERIZATION N/A 6/30/2024    Procedure: Left heart cath;  Surgeon: Macario Mishra Jr., MD;  Location: [facility] CATH LAB;  Service: Cardiology;  Laterality: N/A;       Family history:     Family History   Problem Relation Name Age of Onset    Hypertension Mother      Stroke Mother      Diabetes Mother      Hypertension Father      Heart disease Father      Diabetes Father         Allergy history:      Review of patient's allergies indicates:   Allergen Reactions    Rosuvastatin Other  (See Comments)       Objective:    Vital signs:  Vitals:    24 0000 24 0237 24 0400 24 0623   BP:  117/70     Pulse: 90 85 93    Resp:       Temp:  98.2 °F (36.8 °C)     TempSrc:  Oral     SpO2:  97%     Weight:    76.9 kg (169 lb 8.5 oz)   Height:         Temp (24hrs), Av °F (36.7 °C), Min:97.7 °F (36.5 °C), Max:98.3 °F (36.8 °C)      Physical examination:  GEN: Awake, resting comfortably  EYES: EOMI, no scleral icterus  HENT: MMM. No oral lesions. Fair dentition.  NECK: Supple, no cervical lymphadenopathy or meningismus.   CARDIO: RRR, no murmur.  PULM/CHEST: CTAB. No increased work of breathing  ABD: Normal bowel sounds. Soft, not tender or distended. No HSM appreciated.  MSK: no obvious effusion, swelling, increased warmth, or erythema of major joints. No pedal edema.  SKIN: No rashes. No stigmata of endocarditis.  NEURO: AOx3. Speech fluent. Face symmetric.  PSYCH: Mood appropriate    Diagnostic data:     CBC  Recent Labs   Lab 24  0050 24  0131 24  0545 24  0420 24  0711 24  0401   WBC 14.36*  --  13.10* 8.26 6.45 5.87   HGB 14.8  --  13.9* 12.6* 12.6* 12.1*     --  193 172 128* 147   INR  --  1.0 1.1  --   --   --        BMP  Recent Labs   Lab 24  0130 24  0545 24  0420 24  0711 24  0401     --  135* 134* 136   K 4.1  --  4.5 4.1 4.1     --  102 102 103   CO2 26  --  28 24 25   BUN 20.0  --  25.7 20.7 16.3   CREATININE 0.89  --  1.05 1.15 1.00   CALCIUM 9.5  --  9.3 9.2 8.8   MG  --  2.00 2.10  --  2.10       Results for orders placed during the hospital encounter of 24    X-ray Chest AP Portable    Narrative  EXAMINATION:  Chest one view    CLINICAL HISTORY:  Chest pain    COMPARISON:  None are available to me at this time.    FINDINGS:  Cardiac silhouette is normal size.  Calcifications aortic knob are noted.  Central vessels are within normal limits.  No confluent airspace disease.  No  visible pneumothorax or pleural effusion.  No acute osseous abnormality    Impression  No acute cardiopulmonary process      Electronically signed by: Shawn Manning MD  Date:    06/30/2024  Time:    06:13    Recent Labs   Lab 07/01/24  0420 07/03/24  0711 07/04/24  0401   WBC 8.26 6.45 5.87   HGB 12.6* 12.6* 12.1*   HCT 38.1* 37.5* 36.1*    128* 147     Estimated Creatinine Clearance: 70.5 mL/min (based on SCr of 1 mg/dL).    Lab Results   Component Value Date    CREATININE 1.00 07/04/2024    CREATININE 1.15 07/03/2024    CREATININE 1.05 07/01/2024    ALKPHOS 116 07/04/2024    ALKPHOS 90 07/03/2024    ALKPHOS 81 07/01/2024        Medications   Inpatient  Scheduled Meds:   aspirin  81 mg Oral Daily    ceFAZolin (Ancef) IV (PEDS and ADULTS)  2 g Intravenous Q8H    ezetimibe  10 mg Oral QHS    famotidine  20 mg Oral BID    metoprolol succinate  12.5 mg Oral Daily    nicotine  1 patch Transdermal Daily    ticagrelor  90 mg Oral BID    valsartan  40 mg Oral Daily     Continuous Infusions:  PRN Meds:.  Current Facility-Administered Medications:     acetaminophen, 650 mg, Oral, Q8H PRN    dextrose 10%, 12.5 g, Intravenous, PRN    dextrose 10%, 25 g, Intravenous, PRN    glucagon (human recombinant), 1 mg, Intramuscular, PRN    glucose, 16 g, Oral, PRN    glucose, 24 g, Oral, PRN    HYDROcodone-acetaminophen, 1 tablet, Oral, Q6H PRN    ibuprofen, 400 mg, Oral, Q6H PRN    insulin aspart U-100, 0-5 Units, Subcutaneous, QID (AC + HS) PRN    melatonin, 6 mg, Oral, Nightly PRN    morphine, 2 mg, Intravenous, Q4H PRN    morphine, 4 mg, Intravenous, Q4H PRN    nitroGLYCERIN, 0.4 mg, Sublingual, Q5 Min PRN    ondansetron, 4 mg, Intravenous, Q8H PRN    sodium chloride 0.9%, 10 mL, Intravenous, PRN    Home Meds  Current Outpatient Medications   Medication Instructions    aspirin (ECOTRIN) 81 mg, Oral, Daily    ciprofloxacin HCl (CIPRO) 500 mg, Oral, Every 12 hours    ezetimibe (ZETIA) 10 mg, Oral, Nightly    metoprolol  "succinate (TOPROL-XL) 12.5 mg, Oral, Daily    nicotine (NICODERM CQ) 14 mg/24 hr 1 patch, Transdermal, Daily    nitroGLYCERIN (NITROSTAT) 0.4 mg, Sublingual, Every 5 min PRN    pantoprazole (PROTONIX) 40 mg, Oral, Daily    ticagrelor (BRILINTA) 90 mg, Oral, 2 times daily    valsartan (DIOVAN) 40 mg, Oral, Daily       Diagnostic studies:      CT Chest Abdomen Pelvis Without Contrast (XPD)   Final Result      1. Mild nonspecific perinephric stranding bilaterally.  This is often seen incidentally in hospitalized patients.  However given findings on urinalysis this may be related to urinary tract infection.  No hydronephrosis   2. Hepatic steatosis   3. Cholelithiasis         Electronically signed by: Tarsha Scherer   Date:    07/03/2024   Time:    08:04      X-Ray Chest PA And Lateral   Final Result      No acute chest disease is identified.         Electronically signed by: Dong Yusuf   Date:    07/02/2024   Time:    09:12          Assessment and Plan:     Jb Abbott is a 64 y.o. male with:  MSSA bacteremia:  Source unclear   Non STEMI s/p angiogram with stent placement 6/30/24  Type 2 diabetes with A1c of 7.4  Multiple hardware in his shoulders    Impression:     The patient began experiencing fever on 07/01/2024 following the angiogram procedure. A blood culture taken at that time showed growth of MSSA from one set. I suspect this is hospital-acquired bacteremia. The patient reported "significant bleeding" from his left arm when the line was placed at an outside hospital. He was started on cefazolin this morning. We will order a transthoracic echo.     During my evaluation, the patient was very nervous and stated that he did not want to remain in the hospital any longer. I had an extended discussion with him about the seriousness of a staph infection. I explained that we usually recommend IV antibiotics for several weeks from the first negative blood culture, which we do not have yet. I also " presented data from a recent trial suggesting that oral antibiotics might be an option for patients with low-risk Staph aureus bacteremia (those who clear the blood quickly and have no evidence of metastasis, such as endocarditis). The patient expressed a preference for oral antibiotics and indicated he was not interested in any further workup.     Recommendations:     Continue IV cefazolin 2 g q8h  Pending repeat blood culture  I ordered transthoracic echo  Ideally, he would need a course of IV antibiotic from the 1st negative blood culture but apparently he does not want to wait any longer. If patient decided to leave AMA, may use PO TMP/SMX 1 tab twice for additional 2 weeks     The antibiotics being administered require intensive monitoring for drug toxicity    All questions and concerns addressed with patient and understands and agrees with plan.      Thank you for allowing us to contribute to this patient's care. Please call ID with any questions.     Isael Mckeon MD  Attending, Infectious Disease     7/4/2024 7:12 AM

## 2024-07-04 NOTE — NURSING
Pt insisted on leaving AMA and to not continue his course of IV abx, educated pt on the importance of staying and he declined to stay. stated he is too anxious to be here and needed to leave. Pt signed AMA form @ 5:18pm and witnessed by 2 RNS. Iv taken out, tele monitor taken off and pt left on the elevator to meet wife downstairs. Both hospital medicine and [facility] notified.

## 2024-07-05 LAB
BACTERIA BLD CULT: ABNORMAL
GRAM STN SPEC: ABNORMAL

## 2024-07-05 NOTE — DISCHARGE SUMMARY
[facility] - 6th Floor Medical Telemetry  Short Stay Discharge Summary    Cardiology    Procedure(s) (LRB):  Left heart cath (N/A)    OUTCOME: Left AMA    DISPOSITION: Left Against Medical Advice    TIME SPENT ON DISCHARGE: 00 minutes

## 2024-07-07 LAB — BACTERIA BLD CULT: NORMAL

## 2024-07-08 LAB
BACTERIA BLD CULT: NORMAL
BACTERIA BLD CULT: NORMAL

## 2024-07-17 ENCOUNTER — ANESTHESIA EVENT (OUTPATIENT)
Dept: INTERVENTIONAL RADIOLOGY/VASCULAR | Facility: HOSPITAL | Age: 64
End: 2024-07-17
Payer: MEDICARE

## 2024-07-17 ENCOUNTER — HOSPITAL ENCOUNTER (INPATIENT)
Facility: HOSPITAL | Age: 64
LOS: 3 days | Discharge: HOME | DRG: 250 | End: 2024-07-20
Attending: EMERGENCY MEDICINE | Admitting: HOSPITALIST
Payer: MEDICARE

## 2024-07-17 DIAGNOSIS — I21.11 ST ELEVATION MYOCARDIAL INFARCTION INVOLVING RIGHT CORONARY ARTERY: ICD-10-CM

## 2024-07-17 DIAGNOSIS — I25.10 CAD (CORONARY ARTERY DISEASE): ICD-10-CM

## 2024-07-17 DIAGNOSIS — I21.3 STEMI (ST ELEVATION MYOCARDIAL INFARCTION): ICD-10-CM

## 2024-07-17 DIAGNOSIS — F17.200 TOBACCO DEPENDENCY: ICD-10-CM

## 2024-07-17 DIAGNOSIS — I65.21 RIGHT CAROTID ARTERY OCCLUSION: ICD-10-CM

## 2024-07-17 DIAGNOSIS — R07.9 CHEST PAIN: ICD-10-CM

## 2024-07-17 DIAGNOSIS — I21.19 ACUTE ST ELEVATION MYOCARDIAL INFARCTION (STEMI) OF INFERIOR WALL: Primary | ICD-10-CM

## 2024-07-17 DIAGNOSIS — I63.231: ICD-10-CM

## 2024-07-17 DIAGNOSIS — R10.31 GROIN PAIN, RIGHT: ICD-10-CM

## 2024-07-17 DIAGNOSIS — I21.11 STEMI INVOLVING RIGHT CORONARY ARTERY: ICD-10-CM

## 2024-07-17 LAB
ALBUMIN SERPL-MCNC: 3.8 G/DL (ref 3.4–4.8)
ALBUMIN/GLOB SERPL: 1.1 RATIO (ref 1.1–2)
ALP SERPL-CCNC: 93 UNIT/L (ref 40–150)
ALT SERPL-CCNC: 33 UNIT/L (ref 0–55)
ANION GAP SERPL CALC-SCNC: 12 MEQ/L
APTT PPP: 24.4 SECONDS (ref 23.2–33.7)
AST SERPL-CCNC: 15 UNIT/L (ref 5–34)
BASOPHILS # BLD AUTO: 0.15 X10(3)/MCL
BASOPHILS NFR BLD AUTO: 1 %
BILIRUB SERPL-MCNC: 0.4 MG/DL
BNP BLD-MCNC: 16.4 PG/ML
BUN SERPL-MCNC: 25.6 MG/DL (ref 8.4–25.7)
CALCIUM SERPL-MCNC: 9.6 MG/DL (ref 8.8–10)
CHLORIDE SERPL-SCNC: 101 MMOL/L (ref 98–107)
CO2 SERPL-SCNC: 23 MMOL/L (ref 23–31)
CREAT SERPL-MCNC: 1.28 MG/DL (ref 0.73–1.18)
CREAT/UREA NIT SERPL: 20
EOSINOPHIL # BLD AUTO: 0.15 X10(3)/MCL (ref 0–0.9)
EOSINOPHIL NFR BLD AUTO: 1 %
ERYTHROCYTE [DISTWIDTH] IN BLOOD BY AUTOMATED COUNT: 15.4 % (ref 11.5–17)
GFR SERPLBLD CREATININE-BSD FMLA CKD-EPI: >60 ML/MIN/1.73/M2
GLOBULIN SER-MCNC: 3.5 GM/DL (ref 2.4–3.5)
GLUCOSE SERPL-MCNC: 322 MG/DL (ref 82–115)
HCT VFR BLD AUTO: 42.2 % (ref 42–52)
HGB BLD-MCNC: 13.8 G/DL (ref 14–18)
IMM GRANULOCYTES # BLD AUTO: 0.08 X10(3)/MCL (ref 0–0.04)
IMM GRANULOCYTES NFR BLD AUTO: 0.5 %
INR PPP: 1
LYMPHOCYTES # BLD AUTO: 3.2 X10(3)/MCL (ref 0.6–4.6)
LYMPHOCYTES NFR BLD AUTO: 20.4 %
MCH RBC QN AUTO: 28.2 PG (ref 27–31)
MCHC RBC AUTO-ENTMCNC: 32.7 G/DL (ref 33–36)
MCV RBC AUTO: 86.1 FL (ref 80–94)
MONOCYTES # BLD AUTO: 1.08 X10(3)/MCL (ref 0.1–1.3)
MONOCYTES NFR BLD AUTO: 6.9 %
NEUTROPHILS # BLD AUTO: 11.02 X10(3)/MCL (ref 2.1–9.2)
NEUTROPHILS NFR BLD AUTO: 70.2 %
NRBC BLD AUTO-RTO: 0 %
PLATELET # BLD AUTO: 347 X10(3)/MCL (ref 130–400)
PMV BLD AUTO: 10.5 FL (ref 7.4–10.4)
POC CARDIAC TROPONIN I: 0.02 NG/ML (ref 0–0.08)
POTASSIUM SERPL-SCNC: 4.4 MMOL/L (ref 3.5–5.1)
PROT SERPL-MCNC: 7.3 GM/DL (ref 5.8–7.6)
PROTHROMBIN TIME: 13 SECONDS (ref 12.5–14.5)
RBC # BLD AUTO: 4.9 X10(6)/MCL (ref 4.7–6.1)
SAMPLE: NORMAL
SODIUM SERPL-SCNC: 136 MMOL/L (ref 136–145)
TROPONIN I SERPL-MCNC: <0.01 NG/ML (ref 0–0.04)
WBC # BLD AUTO: 15.68 X10(3)/MCL (ref 4.5–11.5)

## 2024-07-17 PROCEDURE — 85610 PROTHROMBIN TIME: CPT | Performed by: EMERGENCY MEDICINE

## 2024-07-17 PROCEDURE — B3171ZZ FLUOROSCOPY OF LEFT INTERNAL CAROTID ARTERY USING LOW OSMOLAR CONTRAST: ICD-10-PCS | Performed by: PSYCHIATRY & NEUROLOGY

## 2024-07-17 PROCEDURE — 25500020 PHARM REV CODE 255: Performed by: EMERGENCY MEDICINE

## 2024-07-17 PROCEDURE — 85025 COMPLETE CBC W/AUTO DIFF WBC: CPT | Performed by: EMERGENCY MEDICINE

## 2024-07-17 PROCEDURE — 93010 ELECTROCARDIOGRAM REPORT: CPT | Mod: ,,, | Performed by: INTERNAL MEDICINE

## 2024-07-17 PROCEDURE — 99223 1ST HOSP IP/OBS HIGH 75: CPT | Mod: ,,, | Performed by: PSYCHIATRY & NEUROLOGY

## 2024-07-17 PROCEDURE — B31D1ZZ FLUOROSCOPY OF RIGHT VERTEBRAL ARTERY USING LOW OSMOLAR CONTRAST: ICD-10-PCS | Performed by: PSYCHIATRY & NEUROLOGY

## 2024-07-17 PROCEDURE — 25500020 PHARM REV CODE 255: Performed by: INTERNAL MEDICINE

## 2024-07-17 PROCEDURE — 93005 ELECTROCARDIOGRAM TRACING: CPT

## 2024-07-17 PROCEDURE — B3151ZZ FLUOROSCOPY OF BILATERAL COMMON CAROTID ARTERIES USING LOW OSMOLAR CONTRAST: ICD-10-PCS | Performed by: PSYCHIATRY & NEUROLOGY

## 2024-07-17 PROCEDURE — 93458 L HRT ARTERY/VENTRICLE ANGIO: CPT | Performed by: INTERNAL MEDICINE

## 2024-07-17 PROCEDURE — 20000000 HC ICU ROOM

## 2024-07-17 PROCEDURE — 85730 THROMBOPLASTIN TIME PARTIAL: CPT | Performed by: EMERGENCY MEDICINE

## 2024-07-17 PROCEDURE — C1757 CATH, THROMBECTOMY/EMBOLECT: HCPCS | Performed by: INTERNAL MEDICINE

## 2024-07-17 PROCEDURE — 25000003 PHARM REV CODE 250: Performed by: NURSE ANESTHETIST, CERTIFIED REGISTERED

## 2024-07-17 PROCEDURE — 63600175 PHARM REV CODE 636 W HCPCS: Performed by: NURSE ANESTHETIST, CERTIFIED REGISTERED

## 2024-07-17 PROCEDURE — C1769 GUIDE WIRE: HCPCS | Performed by: INTERNAL MEDICINE

## 2024-07-17 PROCEDURE — C1894 INTRO/SHEATH, NON-LASER: HCPCS | Performed by: INTERNAL MEDICINE

## 2024-07-17 PROCEDURE — 80053 COMPREHEN METABOLIC PANEL: CPT | Performed by: EMERGENCY MEDICINE

## 2024-07-17 PROCEDURE — 92973 PRQ TRLUML C MCHN ASP THRMBC: CPT | Performed by: INTERNAL MEDICINE

## 2024-07-17 PROCEDURE — 4A023N7 MEASUREMENT OF CARDIAC SAMPLING AND PRESSURE, LEFT HEART, PERCUTANEOUS APPROACH: ICD-10-PCS | Performed by: INTERNAL MEDICINE

## 2024-07-17 PROCEDURE — C9606 PERC D-E COR REVASC W AMI S: HCPCS | Mod: RC | Performed by: INTERNAL MEDICINE

## 2024-07-17 PROCEDURE — 02703ZZ DILATION OF CORONARY ARTERY, ONE ARTERY, PERCUTANEOUS APPROACH: ICD-10-PCS | Performed by: INTERNAL MEDICINE

## 2024-07-17 PROCEDURE — B3111ZZ FLUOROSCOPY OF RIGHT BRACHIOCEPHALIC-SUBCLAVIAN ARTERY USING LOW OSMOLAR CONTRAST: ICD-10-PCS | Performed by: PSYCHIATRY & NEUROLOGY

## 2024-07-17 PROCEDURE — 84484 ASSAY OF TROPONIN QUANT: CPT | Performed by: EMERGENCY MEDICINE

## 2024-07-17 PROCEDURE — C1725 CATH, TRANSLUMIN NON-LASER: HCPCS | Performed by: INTERNAL MEDICINE

## 2024-07-17 PROCEDURE — 83880 ASSAY OF NATRIURETIC PEPTIDE: CPT | Performed by: EMERGENCY MEDICINE

## 2024-07-17 PROCEDURE — B2111ZZ FLUOROSCOPY OF MULTIPLE CORONARY ARTERIES USING LOW OSMOLAR CONTRAST: ICD-10-PCS | Performed by: INTERNAL MEDICINE

## 2024-07-17 PROCEDURE — C1887 CATHETER, GUIDING: HCPCS | Performed by: INTERNAL MEDICINE

## 2024-07-17 PROCEDURE — 27201423 OPTIME MED/SURG SUP & DEVICES STERILE SUPPLY: Performed by: INTERNAL MEDICINE

## 2024-07-17 PROCEDURE — 25000003 PHARM REV CODE 250: Performed by: INTERNAL MEDICINE

## 2024-07-17 PROCEDURE — 63600175 PHARM REV CODE 636 W HCPCS: Performed by: INTERNAL MEDICINE

## 2024-07-17 PROCEDURE — 02C03ZZ EXTIRPATION OF MATTER FROM CORONARY ARTERY, ONE ARTERY, PERCUTANEOUS APPROACH: ICD-10-PCS | Performed by: INTERNAL MEDICINE

## 2024-07-17 RX ORDER — SUCCINYLCHOLINE CHLORIDE 20 MG/ML
INJECTION INTRAMUSCULAR; INTRAVENOUS
Status: DISCONTINUED | OUTPATIENT
Start: 2024-07-17 | End: 2024-07-17

## 2024-07-17 RX ORDER — NITROGLYCERIN 0.4 MG/1
0.4 TABLET SUBLINGUAL EVERY 5 MIN PRN
Status: CANCELLED | OUTPATIENT
Start: 2024-07-17

## 2024-07-17 RX ORDER — ONDANSETRON HYDROCHLORIDE 2 MG/ML
INJECTION, SOLUTION INTRAVENOUS
Status: DISCONTINUED | OUTPATIENT
Start: 2024-07-17 | End: 2024-07-17

## 2024-07-17 RX ORDER — PRASUGREL 10 MG/1
60 TABLET, FILM COATED ORAL ONCE
Status: DISCONTINUED | OUTPATIENT
Start: 2024-07-17 | End: 2024-07-17

## 2024-07-17 RX ORDER — CLOPIDOGREL BISULFATE 300 MG/1
TABLET, FILM COATED ORAL
Status: DISCONTINUED | OUTPATIENT
Start: 2024-07-17 | End: 2024-07-17 | Stop reason: HOSPADM

## 2024-07-17 RX ORDER — FENTANYL CITRATE 50 UG/ML
INJECTION, SOLUTION INTRAMUSCULAR; INTRAVENOUS
Status: DISCONTINUED | OUTPATIENT
Start: 2024-07-17 | End: 2024-07-17

## 2024-07-17 RX ORDER — TIROFIBAN HYDROCHLORIDE 50 UG/ML
0.15 INJECTION INTRAVENOUS CONTINUOUS
Status: DISPENSED | OUTPATIENT
Start: 2024-07-17 | End: 2024-07-18

## 2024-07-17 RX ORDER — FENTANYL CITRATE 50 UG/ML
INJECTION, SOLUTION INTRAMUSCULAR; INTRAVENOUS
Status: DISCONTINUED | OUTPATIENT
Start: 2024-07-17 | End: 2024-07-17 | Stop reason: HOSPADM

## 2024-07-17 RX ORDER — PANTOPRAZOLE SODIUM 40 MG/1
40 TABLET, DELAYED RELEASE ORAL DAILY
Status: CANCELLED | OUTPATIENT
Start: 2024-07-18

## 2024-07-17 RX ORDER — LIDOCAINE HYDROCHLORIDE 10 MG/ML
INJECTION, SOLUTION EPIDURAL; INFILTRATION; INTRACAUDAL; PERINEURAL
Status: DISCONTINUED | OUTPATIENT
Start: 2024-07-17 | End: 2024-07-17 | Stop reason: HOSPADM

## 2024-07-17 RX ORDER — IBUPROFEN 200 MG
1 TABLET ORAL DAILY
Status: CANCELLED | OUTPATIENT
Start: 2024-07-18

## 2024-07-17 RX ORDER — ACETAMINOPHEN 325 MG/1
650 TABLET ORAL EVERY 4 HOURS PRN
Status: DISCONTINUED | OUTPATIENT
Start: 2024-07-17 | End: 2024-07-20 | Stop reason: HOSPADM

## 2024-07-17 RX ORDER — HEPARIN SODIUM 1000 [USP'U]/ML
INJECTION, SOLUTION INTRAVENOUS; SUBCUTANEOUS
Status: DISCONTINUED | OUTPATIENT
Start: 2024-07-17 | End: 2024-07-17 | Stop reason: HOSPADM

## 2024-07-17 RX ORDER — METOPROLOL TARTRATE 1 MG/ML
INJECTION, SOLUTION INTRAVENOUS
Status: DISCONTINUED | OUTPATIENT
Start: 2024-07-17 | End: 2024-07-17

## 2024-07-17 RX ORDER — VALSARTAN 40 MG/1
40 TABLET ORAL DAILY
Status: CANCELLED | OUTPATIENT
Start: 2024-07-18

## 2024-07-17 RX ORDER — ONDANSETRON HYDROCHLORIDE 2 MG/ML
INJECTION, SOLUTION INTRAVENOUS
Status: DISCONTINUED | OUTPATIENT
Start: 2024-07-17 | End: 2024-07-17 | Stop reason: HOSPADM

## 2024-07-17 RX ORDER — HYDROCODONE BITARTRATE AND ACETAMINOPHEN 5; 325 MG/1; MG/1
1 TABLET ORAL EVERY 4 HOURS PRN
Status: DISCONTINUED | OUTPATIENT
Start: 2024-07-17 | End: 2024-07-20 | Stop reason: HOSPADM

## 2024-07-17 RX ORDER — ASPIRIN 81 MG/1
81 TABLET ORAL DAILY
Status: CANCELLED | OUTPATIENT
Start: 2024-07-18

## 2024-07-17 RX ORDER — NITROGLYCERIN 20 MG/100ML
INJECTION INTRAVENOUS
Status: DISCONTINUED | OUTPATIENT
Start: 2024-07-17 | End: 2024-07-17 | Stop reason: HOSPADM

## 2024-07-17 RX ORDER — MORPHINE SULFATE 4 MG/ML
2 INJECTION, SOLUTION INTRAMUSCULAR; INTRAVENOUS EVERY 4 HOURS PRN
Status: DISCONTINUED | OUTPATIENT
Start: 2024-07-17 | End: 2024-07-20 | Stop reason: HOSPADM

## 2024-07-17 RX ORDER — ONDANSETRON 4 MG/1
8 TABLET, ORALLY DISINTEGRATING ORAL EVERY 8 HOURS PRN
Status: DISCONTINUED | OUTPATIENT
Start: 2024-07-17 | End: 2024-07-20 | Stop reason: HOSPADM

## 2024-07-17 RX ORDER — SODIUM CHLORIDE 9 MG/ML
INJECTION, SOLUTION INTRAVENOUS CONTINUOUS
Status: ACTIVE | OUTPATIENT
Start: 2024-07-17 | End: 2024-07-18

## 2024-07-17 RX ORDER — EZETIMIBE 10 MG/1
10 TABLET ORAL NIGHTLY
Status: CANCELLED | OUTPATIENT
Start: 2024-07-17

## 2024-07-17 RX ORDER — PROPOFOL 10 MG/ML
VIAL (ML) INTRAVENOUS
Status: DISCONTINUED | OUTPATIENT
Start: 2024-07-17 | End: 2024-07-17

## 2024-07-17 RX ORDER — ROCURONIUM BROMIDE 10 MG/ML
INJECTION, SOLUTION INTRAVENOUS
Status: DISCONTINUED | OUTPATIENT
Start: 2024-07-17 | End: 2024-07-17

## 2024-07-17 RX ORDER — ALUMINUM HYDROXIDE, MAGNESIUM HYDROXIDE, AND SIMETHICONE 1200; 120; 1200 MG/30ML; MG/30ML; MG/30ML
SUSPENSION ORAL
Status: DISCONTINUED | OUTPATIENT
Start: 2024-07-17 | End: 2024-07-17 | Stop reason: HOSPADM

## 2024-07-17 RX ORDER — CLOPIDOGREL BISULFATE 75 MG/1
75 TABLET ORAL DAILY
Status: DISCONTINUED | OUTPATIENT
Start: 2024-07-18 | End: 2024-07-20 | Stop reason: HOSPADM

## 2024-07-17 RX ADMIN — ROCURONIUM BROMIDE 10 MG: 10 SOLUTION INTRAVENOUS at 09:07

## 2024-07-17 RX ADMIN — PROPOFOL 100 MG: 10 INJECTION, EMULSION INTRAVENOUS at 09:07

## 2024-07-17 RX ADMIN — PROPOFOL 50 MG: 10 INJECTION, EMULSION INTRAVENOUS at 10:07

## 2024-07-17 RX ADMIN — FENTANYL CITRATE 25 MCG: 50 INJECTION, SOLUTION INTRAMUSCULAR; INTRAVENOUS at 09:07

## 2024-07-17 RX ADMIN — ROCURONIUM BROMIDE 30 MG: 10 SOLUTION INTRAVENOUS at 10:07

## 2024-07-17 RX ADMIN — METOPROLOL TARTRATE 3 MG: 1 INJECTION, SOLUTION INTRAVENOUS at 11:07

## 2024-07-17 RX ADMIN — IOHEXOL 85 ML: 350 INJECTION, SOLUTION INTRAVENOUS at 08:07

## 2024-07-17 RX ADMIN — SUCCINYLCHOLINE CHLORIDE 140 MG: 20 INJECTION, SOLUTION INTRAMUSCULAR; INTRAVENOUS at 09:07

## 2024-07-17 RX ADMIN — ONDANSETRON 4 MG: 2 INJECTION INTRAMUSCULAR; INTRAVENOUS at 11:07

## 2024-07-17 RX ADMIN — SODIUM CHLORIDE: 9 INJECTION, SOLUTION INTRAVENOUS at 09:07

## 2024-07-17 RX ADMIN — IOPAMIDOL 100 ML: 755 INJECTION, SOLUTION INTRAVENOUS at 11:07

## 2024-07-17 RX ADMIN — SUGAMMADEX 200 MG: 100 INJECTION, SOLUTION INTRAVENOUS at 11:07

## 2024-07-17 RX ADMIN — FENTANYL CITRATE 25 MCG: 50 INJECTION, SOLUTION INTRAMUSCULAR; INTRAVENOUS at 10:07

## 2024-07-17 NOTE — Clinical Note
The catheter was inserted into the and was inserted over the wire into the proximal   right coronary artery. Aspiration performed. Catheter removed.

## 2024-07-17 NOTE — Clinical Note
The catheter was inserted into the and was inserted over the wire into the ostium   left main. Hemodynamics were performed.  An angiography was performed of the left coronary arteries. Multiple views were taken. The angiography was performed via power injection.  Catheter removed

## 2024-07-18 PROBLEM — I63.231: Status: ACTIVE | Noted: 2024-07-18

## 2024-07-18 LAB
ALBUMIN SERPL-MCNC: 3.4 G/DL (ref 3.4–4.8)
ALBUMIN/GLOB SERPL: 1.1 RATIO (ref 1.1–2)
ALP SERPL-CCNC: 86 UNIT/L (ref 40–150)
ALT SERPL-CCNC: 35 UNIT/L (ref 0–55)
ANION GAP SERPL CALC-SCNC: 11 MEQ/L
AST SERPL-CCNC: 52 UNIT/L (ref 5–34)
BASOPHILS # BLD AUTO: 0.06 X10(3)/MCL
BASOPHILS NFR BLD AUTO: 0.6 %
BILIRUB SERPL-MCNC: 0.3 MG/DL
BUN SERPL-MCNC: 24.5 MG/DL (ref 8.4–25.7)
CALCIUM SERPL-MCNC: 8.4 MG/DL (ref 8.8–10)
CHLORIDE SERPL-SCNC: 104 MMOL/L (ref 98–107)
CO2 SERPL-SCNC: 21 MMOL/L (ref 23–31)
CREAT SERPL-MCNC: 1.1 MG/DL (ref 0.73–1.18)
CREAT/UREA NIT SERPL: 22
EOSINOPHIL # BLD AUTO: 0.01 X10(3)/MCL (ref 0–0.9)
EOSINOPHIL NFR BLD AUTO: 0.1 %
ERYTHROCYTE [DISTWIDTH] IN BLOOD BY AUTOMATED COUNT: 15.7 % (ref 11.5–17)
GFR SERPLBLD CREATININE-BSD FMLA CKD-EPI: >60 ML/MIN/1.73/M2
GLOBULIN SER-MCNC: 3 GM/DL (ref 2.4–3.5)
GLUCOSE SERPL-MCNC: 347 MG/DL (ref 82–115)
HCT VFR BLD AUTO: 34.2 % (ref 42–52)
HCT VFR BLD AUTO: 34.8 % (ref 42–52)
HCT VFR BLD AUTO: 35.8 % (ref 42–52)
HCT VFR BLD AUTO: 36.5 % (ref 42–52)
HCT VFR BLD AUTO: 37.2 % (ref 42–52)
HCT VFR BLD AUTO: 41 % (ref 42–52)
HGB BLD-MCNC: 11.2 G/DL (ref 14–18)
HGB BLD-MCNC: 11.5 G/DL (ref 14–18)
HGB BLD-MCNC: 11.7 G/DL (ref 14–18)
HGB BLD-MCNC: 12 G/DL (ref 14–18)
HGB BLD-MCNC: 12.1 G/DL (ref 14–18)
HGB BLD-MCNC: 13.4 G/DL (ref 14–18)
IMM GRANULOCYTES # BLD AUTO: 0.04 X10(3)/MCL (ref 0–0.04)
IMM GRANULOCYTES NFR BLD AUTO: 0.4 %
LYMPHOCYTES # BLD AUTO: 1.06 X10(3)/MCL (ref 0.6–4.6)
LYMPHOCYTES NFR BLD AUTO: 9.9 %
MAGNESIUM SERPL-MCNC: 2 MG/DL (ref 1.6–2.6)
MCH RBC QN AUTO: 27.9 PG (ref 27–31)
MCHC RBC AUTO-ENTMCNC: 32.5 G/DL (ref 33–36)
MCV RBC AUTO: 85.7 FL (ref 80–94)
MONOCYTES # BLD AUTO: 0.71 X10(3)/MCL (ref 0.1–1.3)
MONOCYTES NFR BLD AUTO: 6.6 %
NEUTROPHILS # BLD AUTO: 8.85 X10(3)/MCL (ref 2.1–9.2)
NEUTROPHILS NFR BLD AUTO: 82.4 %
NRBC BLD AUTO-RTO: 0 %
OHS QRS DURATION: 86 MS
OHS QRS DURATION: 96 MS
OHS QTC CALCULATION: 401 MS
OHS QTC CALCULATION: 454 MS
PHOSPHATE SERPL-MCNC: 4.4 MG/DL (ref 2.3–4.7)
PLATELET # BLD AUTO: 245 X10(3)/MCL (ref 130–400)
PMV BLD AUTO: 10 FL (ref 7.4–10.4)
POC ACTIVATED CLOTTING TIME K: 134 SEC (ref 74–137)
POTASSIUM SERPL-SCNC: 4.6 MMOL/L (ref 3.5–5.1)
PROT SERPL-MCNC: 6.4 GM/DL (ref 5.8–7.6)
RBC # BLD AUTO: 4.34 X10(6)/MCL (ref 4.7–6.1)
SAMPLE: NORMAL
SODIUM SERPL-SCNC: 136 MMOL/L (ref 136–145)
WBC # BLD AUTO: 10.73 X10(3)/MCL (ref 4.5–11.5)

## 2024-07-18 PROCEDURE — 27000221 HC OXYGEN, UP TO 24 HOURS

## 2024-07-18 PROCEDURE — 25000003 PHARM REV CODE 250

## 2024-07-18 PROCEDURE — 99900035 HC TECH TIME PER 15 MIN (STAT)

## 2024-07-18 PROCEDURE — 25000003 PHARM REV CODE 250: Performed by: HOSPITALIST

## 2024-07-18 PROCEDURE — 85018 HEMOGLOBIN: CPT | Performed by: SURGERY

## 2024-07-18 PROCEDURE — 84100 ASSAY OF PHOSPHORUS: CPT

## 2024-07-18 PROCEDURE — 36415 COLL VENOUS BLD VENIPUNCTURE: CPT

## 2024-07-18 PROCEDURE — 93005 ELECTROCARDIOGRAM TRACING: CPT

## 2024-07-18 PROCEDURE — 36415 COLL VENOUS BLD VENIPUNCTURE: CPT | Performed by: PSYCHIATRY & NEUROLOGY

## 2024-07-18 PROCEDURE — 85014 HEMATOCRIT: CPT | Performed by: SURGERY

## 2024-07-18 PROCEDURE — 99900031 HC PATIENT EDUCATION (STAT)

## 2024-07-18 PROCEDURE — 25000003 PHARM REV CODE 250: Performed by: INTERNAL MEDICINE

## 2024-07-18 PROCEDURE — 85025 COMPLETE CBC W/AUTO DIFF WBC: CPT

## 2024-07-18 PROCEDURE — 94760 N-INVAS EAR/PLS OXIMETRY 1: CPT

## 2024-07-18 PROCEDURE — 80053 COMPREHEN METABOLIC PANEL: CPT

## 2024-07-18 PROCEDURE — 63600175 PHARM REV CODE 636 W HCPCS

## 2024-07-18 PROCEDURE — 20000000 HC ICU ROOM

## 2024-07-18 PROCEDURE — 25500020 PHARM REV CODE 255: Performed by: HOSPITALIST

## 2024-07-18 PROCEDURE — 85018 HEMOGLOBIN: CPT | Performed by: PSYCHIATRY & NEUROLOGY

## 2024-07-18 PROCEDURE — 25500020 PHARM REV CODE 255: Performed by: PSYCHIATRY & NEUROLOGY

## 2024-07-18 PROCEDURE — 63600175 PHARM REV CODE 636 W HCPCS: Mod: JZ | Performed by: INTERNAL MEDICINE

## 2024-07-18 PROCEDURE — 83735 ASSAY OF MAGNESIUM: CPT

## 2024-07-18 PROCEDURE — 85014 HEMATOCRIT: CPT | Performed by: PSYCHIATRY & NEUROLOGY

## 2024-07-18 RX ORDER — MUPIROCIN 20 MG/G
OINTMENT TOPICAL 2 TIMES DAILY
Status: DISCONTINUED | OUTPATIENT
Start: 2024-07-18 | End: 2024-07-20 | Stop reason: HOSPADM

## 2024-07-18 RX ORDER — ASPIRIN 81 MG/1
81 TABLET ORAL DAILY
Status: DISCONTINUED | OUTPATIENT
Start: 2024-07-18 | End: 2024-07-20 | Stop reason: HOSPADM

## 2024-07-18 RX ORDER — SODIUM CHLORIDE 9 MG/ML
INJECTION, SOLUTION INTRAVENOUS CONTINUOUS
Status: DISCONTINUED | OUTPATIENT
Start: 2024-07-18 | End: 2024-07-20 | Stop reason: HOSPADM

## 2024-07-18 RX ORDER — HYDRALAZINE HYDROCHLORIDE 20 MG/ML
10 INJECTION INTRAMUSCULAR; INTRAVENOUS EVERY 4 HOURS PRN
Status: DISCONTINUED | OUTPATIENT
Start: 2024-07-18 | End: 2024-07-20 | Stop reason: HOSPADM

## 2024-07-18 RX ORDER — LABETALOL HYDROCHLORIDE 5 MG/ML
10 INJECTION, SOLUTION INTRAVENOUS EVERY 4 HOURS PRN
Status: DISCONTINUED | OUTPATIENT
Start: 2024-07-18 | End: 2024-07-20 | Stop reason: HOSPADM

## 2024-07-18 RX ORDER — GLUCAGON 1 MG
1 KIT INJECTION
Status: DISCONTINUED | OUTPATIENT
Start: 2024-07-18 | End: 2024-07-20 | Stop reason: HOSPADM

## 2024-07-18 RX ORDER — PANTOPRAZOLE SODIUM 40 MG/10ML
40 INJECTION, POWDER, LYOPHILIZED, FOR SOLUTION INTRAVENOUS DAILY
Status: DISCONTINUED | OUTPATIENT
Start: 2024-07-18 | End: 2024-07-20 | Stop reason: HOSPADM

## 2024-07-18 RX ORDER — BISACODYL 10 MG/1
10 SUPPOSITORY RECTAL DAILY PRN
Status: DISCONTINUED | OUTPATIENT
Start: 2024-07-18 | End: 2024-07-20 | Stop reason: HOSPADM

## 2024-07-18 RX ORDER — INSULIN ASPART 100 [IU]/ML
0-5 INJECTION, SOLUTION INTRAVENOUS; SUBCUTANEOUS EVERY 6 HOURS PRN
Status: DISCONTINUED | OUTPATIENT
Start: 2024-07-18 | End: 2024-07-20 | Stop reason: HOSPADM

## 2024-07-18 RX ORDER — ATROPINE SULFATE 0.1 MG/ML
INJECTION INTRAVENOUS
Status: COMPLETED
Start: 2024-07-18 | End: 2024-07-18

## 2024-07-18 RX ADMIN — MUPIROCIN: 20 OINTMENT TOPICAL at 09:07

## 2024-07-18 RX ADMIN — LABETALOL HYDROCHLORIDE 10 MG: 5 INJECTION, SOLUTION INTRAVENOUS at 07:07

## 2024-07-18 RX ADMIN — MUPIROCIN: 20 OINTMENT TOPICAL at 08:07

## 2024-07-18 RX ADMIN — SODIUM CHLORIDE: 9 INJECTION, SOLUTION INTRAVENOUS at 01:07

## 2024-07-18 RX ADMIN — HYDROCODONE BITARTRATE AND ACETAMINOPHEN 1 TABLET: 5; 325 TABLET ORAL at 09:07

## 2024-07-18 RX ADMIN — MORPHINE SULFATE 2 MG: 4 INJECTION, SOLUTION INTRAMUSCULAR; INTRAVENOUS at 04:07

## 2024-07-18 RX ADMIN — MORPHINE SULFATE 2 MG: 4 INJECTION, SOLUTION INTRAMUSCULAR; INTRAVENOUS at 05:07

## 2024-07-18 RX ADMIN — IOHEXOL 100 ML: 350 INJECTION, SOLUTION INTRAVENOUS at 11:07

## 2024-07-18 RX ADMIN — SODIUM CHLORIDE: 9 INJECTION, SOLUTION INTRAVENOUS at 12:07

## 2024-07-18 RX ADMIN — HYDRALAZINE HYDROCHLORIDE 10 MG: 20 INJECTION INTRAMUSCULAR; INTRAVENOUS at 06:07

## 2024-07-18 RX ADMIN — MORPHINE SULFATE 2 MG: 4 INJECTION, SOLUTION INTRAMUSCULAR; INTRAVENOUS at 01:07

## 2024-07-18 RX ADMIN — CLOPIDOGREL BISULFATE 75 MG: 75 TABLET ORAL at 08:07

## 2024-07-18 RX ADMIN — TIROFIBAN 0.15 MCG/KG/MIN: 5 INJECTION, SOLUTION INTRAVENOUS at 12:07

## 2024-07-18 RX ADMIN — PANTOPRAZOLE SODIUM 40 MG: 40 INJECTION, POWDER, LYOPHILIZED, FOR SOLUTION INTRAVENOUS at 08:07

## 2024-07-18 RX ADMIN — MORPHINE SULFATE 2 MG: 4 INJECTION, SOLUTION INTRAMUSCULAR; INTRAVENOUS at 12:07

## 2024-07-18 RX ADMIN — HYDRALAZINE HYDROCHLORIDE 10 MG: 20 INJECTION INTRAMUSCULAR; INTRAVENOUS at 12:07

## 2024-07-18 RX ADMIN — LABETALOL HYDROCHLORIDE 10 MG: 5 INJECTION, SOLUTION INTRAVENOUS at 08:07

## 2024-07-18 RX ADMIN — ASPIRIN 81 MG: 81 TABLET, COATED ORAL at 08:07

## 2024-07-18 NOTE — PT/OT/SLP PROGRESS
[facility] Speech Language Pathology Department    Patient Name:  Jb Abbott   MRN:  [id number]    Routine CVA orders received, chart reviewed and nursing consulted.  Pt was admitted to ICU s/p cerebral angiogram after presenting with left sided weakness.  Follow up CT revealed no acute findings.  Pt passed nursing swallow screen.  Attempted to complete initial speech/language and cognitive evaluation, however pt lethargic following pain medications.  SLP to reattempt in AM.

## 2024-07-18 NOTE — SEDATION DOCUMENTATION
Dr Zachery Lr with vascular services consulted; Dr Dawson discussed case via phone with consulting MD

## 2024-07-18 NOTE — PROGRESS NOTES
[facility]  Progress Note      Patient: Jb Abbott  MRN: [id number]  STATUS: IP- Inpatient   DOS: 7/18/2024   PCP: No, Primary Doctor      SUBJECTIVE  64 y.o. male with a history right common femoral access complication; after heart catheterization and cerebral angiogram angiogram    Complaint of flank pain    ALLERGIES  Reviewed as documented in EMR  Penicillins and Rosuvastatin      PHYSICAL EXAM  VITALS:  Temp: 98.2 °F (36.8 °C) (07/18/24 1200)  Pulse: 93 (07/18/24 1500)  Resp: 13 (07/18/24 1500)  BP: 122/67 (07/18/24 1500)  SpO2: 98 % (07/18/24 1500)    Groin is soft       LABS  Labs:  Recent Labs     07/17/24 2023 07/18/24  0023 07/18/24  0507 07/18/24  0824 07/18/24  1202   WBC 15.68*  --  10.73  --   --    HGB 13.8*   < > 12.1* 12.0* 11.7*   HCT 42.2   < > 37.2* 36.5* 35.8*   MCV 86.1  --  85.7  --   --    MCH 28.2  --  27.9  --   --    MCHC 32.7*  --  32.5*  --   --    RDW 15.4  --  15.7  --   --      --  245  --   --     < > = values in this interval not displayed.     Recent Labs     07/17/24 2023 07/18/24  0506    136   K 4.4 4.6    104   BUN 25.6 24.5   CREATININE 1.28* 1.10   GLUCOSE 322* 347*     Recent Labs     07/17/24 2023 07/18/24  0506   CALCIUM 9.6 8.4*   MG  --  2.00   PHOS  --  4.4     Recent Labs     07/17/24 2023   INR 1.0   APTT 24.4     Recent Labs     07/17/24 2023 07/18/24  0506   BILITOT 0.4 0.3   AST 15 52*   ALT 33 35   ALKPHOS 93 86   ALBUMIN 3.8 3.4         RADIOLOGY  CT scan reviewed with no singh contrast extravasation       ASSESSMENT  64 y.o. male with a history right common femoral access complication; after heart catheterization and cerebral angiogram angiogram    -Continue with trending hemoglobin   -Patient is stable     Zachery Lr MD

## 2024-07-18 NOTE — H&P
[facility] - Cath Lab Services  Pulmonary Critical Care Note    Patient Name: Jb Abbott  MRN: [id number]  Admission Date: 7/17/2024  Hospital Length of Stay: 1 days  Code Status: Prior  Attending Provider: Mark Padilla MD  Primary Care Provider: Greer, Primary Doctor     Subjective:     HPI:   Jb Abbott is a 64 y.o. male with PMH of 100% RCA occlusion status post coronary angiography on 06/30/2024 noncompliant with Brilinta, hypertension, diabetes, hyperlipidemia who presented to the ED on 07/18/2024 with CC of syncope, chest pain, diaphoresis, left arm weakness.  Of note patient has has a recent history of NSTEMI due to 100% occlusion of the right coronary artery status post stent placement; however, patient AMA aide and has been noncompliant with his Brilinta.  Today, patient began to experience chest pain unrelieved by nitroglycerin.  Patient received 80 mcg, 3 sprays of nitro, 1 in of nitropaste, 324 mg of aspirin EN route.  Patient also experiencing left-sided weakness originally thought to be due to nerve damage.  CT head without contrast negative for brain bleed.  CTA head and neck demonstrates 100% occlusion of the right coronary artery.  Patient was taken cath lab for coronary angiogram where in stent thrombosis of the RCA was noted; status post mechanical thrombectomy and balloon angioplasty of the proximal to mid RCA and balloon angioplasty of the inferior branch of a PLB embolus.  Patient was subsequently taken to neurointerventional suite where he underwent cerebral angiogram which revealed total occlusion of the right internal carotid artery at the origin, collateral flow in the right hemisphere via the right external carotid artery and right posterior communicating artery anastomosis; emboli noted in the right LINN and right ICA terminus.  Attempt to perform angioplasty of the right common carotid was attempted; however, unsuccessful due to its chronic nature  and the procedure was aborted due to risk of distal emboli and further worsening neurologic deficits.  Of note procedure originally started with right femoral access; however, extravasation of contrast was noted and vascular surgery was consulted.  Patient admitted to the ICU with intact femoral sheath access in the event of urgent vascular surgical intervention.    Hospital Course/Significant events:  7/18 - Admitted to ICU     24 Hour Interval History:  Pending     Past Medical History:   Diagnosis Date    Chronic pain     Coronary artery disease     Diabetes mellitus     GERD (gastroesophageal reflux disease)     Hypercholesterolemia     Hypertension     NSTEMI (non-ST elevated myocardial infarction) 07/03/2024       Past Surgical History:   Procedure Laterality Date    APPENDECTOMY      CORONARY ANGIOPLASTY WITH STENT PLACEMENT      X4    LEFT HEART CATHETERIZATION N/A 6/30/2024    Procedure: Left heart cath;  Surgeon: Macario Mishra Jr., MD;  Location: [facility] CATH LAB;  Service: Cardiology;  Laterality: N/A;       Social History     Socioeconomic History    Marital status:    Tobacco Use    Smoking status: Every Day     Types: Cigarettes    Smokeless tobacco: Never   Substance and Sexual Activity    Alcohol use: Not Currently    Drug use: Never     Social Determinants of Health     Financial Resource Strain: Patient Declined (7/2/2024)    Overall Financial Resource Strain (CARDIA)     Difficulty of Paying Living Expenses: Patient declined   Food Insecurity: Patient Declined (7/2/2024)    Hunger Vital Sign     Worried About Running Out of Food in the Last Year: Patient declined     Ran Out of Food in the Last Year: Patient declined   Transportation Needs: Patient Declined (7/2/2024)    TRANSPORTATION NEEDS     Transportation : Patient declined   Stress: Patient Declined (7/2/2024)    [employer] - Occupational Stress Questionnaire     Feeling of Stress : Patient declined    Housing Stability: Patient Declined (7/2/2024)    Housing Stability Vital Sign     Unable to Pay for Housing in the Last Year: Patient declined     Homeless in the Last Year: Patient declined       Current Outpatient Medications   Medication Instructions    aspirin (ECOTRIN) 81 mg, Oral, Daily    ciprofloxacin HCl (CIPRO) 500 mg, Oral, Every 12 hours    ezetimibe (ZETIA) 10 mg, Oral, Nightly    metoprolol succinate (TOPROL-XL) 12.5 mg, Oral, Daily    nicotine (NICODERM CQ) 14 mg/24 hr 1 patch, Transdermal, Daily    nitroGLYCERIN (NITROSTAT) 0.4 mg, Sublingual, Every 5 min PRN    pantoprazole (PROTONIX) 40 mg, Oral, Daily    ticagrelor (BRILINTA) 90 mg, Oral, 2 times daily    valsartan (DIOVAN) 40 mg, Oral, Daily     Current Inpatient Medications   clopidogreL  75 mg Oral Daily    mupirocin   Nasal BID     Current Intravenous Infusions   0.9% NaCl   Intravenous Continuous 100 mL/hr at 07/18/24 0014 New Bag at 07/18/24 0014    tirofiban-0.9% sodium chloride 12.5 mg/250ml  0.15 mcg/kg/min Intravenous Continuous 14.5 mL/hr at 07/18/24 0023 0.15 mcg/kg/min at 07/18/24 0023     Review of Systems   Constitutional:  Positive for fever. Negative for chills.   Respiratory:  Negative for cough and shortness of breath.    Cardiovascular:  Positive for chest pain.   Gastrointestinal:  Positive for abdominal pain.   Genitourinary:  Positive for dysuria.   Musculoskeletal:  Positive for myalgias.   Neurological:  Positive for weakness and headaches.        Objective:     Intake/Output Summary (Last 24 hours) at 7/18/2024 0048  Last data filed at 7/17/2024 2353  Gross per 24 hour   Intake 600 ml   Output --   Net 600 ml     Vital Signs (Most Recent):  Temp: 97.4 °F (36.3 °C) (07/17/24 2003)  Pulse: 81 (07/17/24 2040)  Resp: 19 (07/18/24 0043)  BP: (!) 179/109 (07/18/24 0017)  SpO2: 98 % (07/17/24 2040)  Body mass index is 27.88 kg/m².  Weight: 80.7 kg (178 lb) Vital Signs (24h Range):  Temp:  [97.4 °F (36.3 °C)] 97.4 °F (36.3  "°C)  Pulse:  [76-93] 81  Resp:  [8-28] 19  SpO2:  [91 %-100 %] 98 %  BP: (113-179)/() 179/109     Physical Exam:  General: Well nourished w/o distress  HEENT: NC/AT; PERRL; nasal and oral mucosa moist and clear; on NC   Pulm: CTA bilaterally, normal work of breathing  CV: S1, S2 w/o murmurs or gallops; no edema noted  GI: Bowel sound present in all quadrants, abdomen mildly tender to palpation  MSK: Full ROM of all extremities w/o limitation or discomfort  Derm: No rashes, abnormal bruising, or skin lesions  Neuro: AAOx4; motor/sensory function intact  Psych: Cooperative; appropriate mood and affect    Lines/Drains/Airways       Arterial Line  Duration             Arterial Line 07/17/24 2202 Left Radial <1 day              Peripheral Intravenous Line  Duration                  Peripheral IV - Single Lumen 18 G Anterior;Left;Proximal Forearm -- days         Peripheral IV - Single Lumen 07/17/24 2005 18 G Anterior;Proximal;Right Forearm <1 day         Sheath 07/17/24 2157 Left anterior;proximal <1 day                  Significant Labs:  Lab Results   Component Value Date    WBC 15.68 (H) 07/17/2024    HGB 13.8 (L) 07/17/2024    HCT 42.2 07/17/2024    MCV 86.1 07/17/2024     07/17/2024       BMP  Lab Results   Component Value Date     07/17/2024    K 4.4 07/17/2024    CO2 23 07/17/2024    BUN 25.6 07/17/2024    CREATININE 1.28 (H) 07/17/2024    CALCIUM 9.6 07/17/2024    AGAP 12.0 07/17/2024     ABG  No results for input(s): "PH", "PO2", "PCO2", "HCO3", "BE" in the last 168 hours.  Mechanical Ventilation Support:       Significant Imaging:  I have reviewed the pertinent imaging within the past 24 hours.    Assessment/Plan:     Assessment  R ICA occlusion   Inferior wall STEMI s/p angiogram   Right femoral sheath extravasation   Recent H/o NSTEMI 2/2 100% RCA occlusion s/p angioplasty/stenting on 6/30; not compliant with DAPT   HTN  HLD  Hyperglycemia     Plan  -Admitted to ICU for ongoing monitoring " and medical management   -Serial H/H q4hr; sudden drop, call vascular surgery (Dr. Be rockwell)  -q1 neurochecks   -r/p CTH in AM   -Aggrastat gtt for 18 hrs 2/2 RCA occlusion; DAPT with ASA and Plavix per interventional cardiology   -Maintain L femoral sheath in case vascular surgery needs access; removal tomorrow, 2 hrs after aggrastat infusion stopped  -Maintain MAP > 65 with -160; prn anti-HTN ordered   -LD SSI     DVT Prophylaxis: agrostat  GI Prophylaxis: protonix 40 IV      32 minutes of critical care was time spent personally by me on the following activities: development of treatment plan with patient or surrogate and bedside caregivers, discussions with consultants, evaluation of patient's response to treatment, examination of patient, ordering and performing treatments and interventions, ordering and review of laboratory studies, ordering and review of radiographic studies, pulse oximetry, re-evaluation of patient's condition.  This critical care time did not overlap with that of any other provider or involve time for any procedures.     Mahendra Herrera,  PGY-2  Pulmonary Critical Care Medicine  [facility] - Cath Lab Services

## 2024-07-18 NOTE — ANESTHESIA PROCEDURE NOTES
Intubation    Date/Time: 7/17/2024 9:55 PM    Performed by: Alejandro Shook CRNA  Authorized by: Erasmo Mcmahon MD    Intubation:     Induction:  Rapid sequence induction    Intubated:  Postinduction    Mask Ventilation:  N/a    Attempts:  1    Attempted By:  CRNA    Method of Intubation:  Direct    Blade:  Song 2    Laryngeal View Grade: Grade I - full view of cords      Difficult Airway Encountered?: No      Complications:  None    Airway Device:  Oral endotracheal tube    Airway Device Size:  7.5    Style/Cuff Inflation:  Cuffed    Inflation Amount (mL):  5    Tube secured:  22    Secured at:  The lips    Placement Verified By:  Capnometry    Complicating Factors:  None    Findings Post-Intubation:  BS equal bilateral and atraumatic/condition of teeth unchanged

## 2024-07-18 NOTE — ASSESSMENT & PLAN NOTE
Presented with syncope, diaphoresis, CP, and left-sided weakness  -CTA H/N concerning for right Internal carotid artery occlusion   S/P - unsuccessful attempt at revascularizing right ICA due to chronic plaque  - collateral flow in right ICA from right ECA anastomoses and right    Thrombectomy delayed due to stabilizing measures    Plan:    Repeat CT head ordered.  Continue DAPT.   Aggrastat per Cardiology.  - - MAP>65. -160  - H&H q4h   -ok for neuro checks every 2 hours.  - call vascular surgery Dr Lr if significant drop in hemoglobin or concern for hemodynamic instability. (Dr Lr aware)  - left femoral sheath secured to skin in case needed for urgent surgical intervention  - remove left femoral sheath tomorrow 2 hrs after aggrastat infusion is stopped    Further recommendations per MD.

## 2024-07-18 NOTE — ANESTHESIA POSTPROCEDURE EVALUATION
Anesthesia Post Evaluation    Patient: Jb Abbott    Procedure(s) Performed: * No procedures listed *    Final Anesthesia Type: general      Patient location during evaluation: ICU  Patient participation: Yes- Able to Participate  Level of consciousness: awake and alert  Post-procedure vital signs: reviewed and stable  Pain management: adequate  Airway patency: patent  RAJNI mitigation strategies: Multimodal analgesia  PONV status at discharge: No PONV  Anesthetic complications: no      Cardiovascular status: blood pressure returned to baseline and hemodynamically stable  Respiratory status: spontaneous ventilation and nasal cannula  Follow-up not needed.          Vitals Value Taken Time   /67 07/18/24 1501   Temp 36.8 °C (98.2 °F) 07/18/24 1200   Pulse 92 07/18/24 1514   Resp 15 07/18/24 1514   SpO2 99 % 07/18/24 1514   Vitals shown include unfiled device data.      No case tracking events are documented in the log.      Pain/Moe Score: Pain Rating Prior to Med Admin: 7 (7/18/2024  1:07 PM)  Pain Rating Post Med Admin: 0 (7/18/2024  1:37 PM)  Moe Score: 10 (7/17/2024  9:31 PM)

## 2024-07-18 NOTE — HPI
Patient is a 64 y.o. male presents with chest pain, diaphoresis, syncopal event that started about 1 hr ago. He has a history of recent coronary angiogram where he was found to have right coronary artery occlusion and required angioplasty/stenting. However patient left AMA and did not take his antiplatelets. On arrival to ED, he was also noted to have left sided weakness regarding which he reported that he has left shoulder injury with nerve damage. CTH, CTA showed no acute infarct but showed a right ICA occlusion at the origin. NIHSS was noted to be 9 while in the ED. He was found to have Acute MI based on the initial work up and was taken stat to cardiac cath lab for coronary revascularization.      Post coronary angioplasty, patient was alert, oriented, moving LUE antigravity but weaker than the right side. Able to lift the LLE antigravity. SBP in 160s at that time. After discussing with wife, patient was brought for cerebral angiogram and possible intervention. Per wife, patient did not have any baseline weakness and was able to use his left arm despite some nerve pain in the forearm.

## 2024-07-18 NOTE — SUBJECTIVE & OBJECTIVE
Subjective:     Interval History: No acute events overnight. Left groin sheath in place without hematoma. Right groin site without edema or swelling. Patient complained of right lower quadrant abdominal pain. CT abdomen ordered per Intensivist. Vascular Surgery following. Repeat CT head ordered.   Patient is alert and oriented. Left facial droop. LUE antigravity but weaker than the RUE. Good hand .     Current Neurological Medications:     Current Facility-Administered Medications   Medication Dose Route Frequency Provider Last Rate Last Admin    0.9%  NaCl infusion   Intravenous Continuous Vinicio Nieves  mL/hr at 07/18/24 0014 New Bag at 07/18/24 0014    0.9%  NaCl infusion   Intravenous Continuous Dona Ang, LEONA        acetaminophen tablet 650 mg  650 mg Oral Q4H PRN Vinicio Nieves MD        aspirin EC tablet 81 mg  81 mg Oral Daily Vinicio Nieves MD   81 mg at 07/18/24 0834    bisacodyL suppository 10 mg  10 mg Rectal Daily PRN Dona Ang NP        clopidogreL tablet 75 mg  75 mg Oral Daily Vinicio Nieves MD   75 mg at 07/18/24 0834    dextrose 10% bolus 125 mL 125 mL  12.5 g Intravenous PRN Mahendra Herrera,         dextrose 10% bolus 250 mL 250 mL  25 g Intravenous PRN Mahendra Herrera DO        glucagon (human recombinant) injection 1 mg  1 mg Intramuscular PRN Mahendra Herrera DO        hydrALAZINE injection 10 mg  10 mg Intravenous Q4H PRN Mahendra Herrera, DO   10 mg at 07/18/24 0017    HYDROcodone-acetaminophen 5-325 mg per tablet 1 tablet  1 tablet Oral Q4H PRN Vinicio Nieves MD        insulin aspart U-100 injection 0-5 Units  0-5 Units Subcutaneous Q6H PRN Mahendra Herrera DO        labetaloL injection 10 mg  10 mg Intravenous Q4H PRN Mahendra Herrera DO        morphine injection 2 mg  2 mg Intravenous Q4H PRN Vinicio Nieves MD   2 mg at 07/18/24 0556    mupirocin 2 % ointment   Nasal BID Mark Padilla MD   Given at 07/18/24 0834    ondansetron disintegrating tablet 8 mg  8 mg Oral  Q8H PRN Vinicio Nieves MD        pantoprazole injection 40 mg  40 mg Intravenous Daily Mahendra Herrera, DO   40 mg at 07/18/24 0834    tirofiban 12.5 mg in sodium chloride 0.9% 250 mL infusion  0.15 mcg/kg/min Intravenous Continuous Vinicio Nieves MD 14.5 mL/hr at 07/18/24 0023 0.15 mcg/kg/min at 07/18/24 0023       Review of Systems  Objective:     Vital Signs (Most Recent):  Temp: 98.7 °F (37.1 °C) (07/18/24 0400)  Pulse: 93 (07/18/24 0900)  Resp: 16 (07/18/24 0900)  BP: 116/67 (07/18/24 0900)  SpO2: 97 % (07/18/24 0900) Vital Signs (24h Range):  Temp:  [97.4 °F (36.3 °C)-98.7 °F (37.1 °C)] 98.7 °F (37.1 °C)  Pulse:  [76-93] 93  Resp:  [8-28] 16  SpO2:  [91 %-100 %] 97 %  BP: (102-179)/() 116/67  Arterial Line BP: (130-210)/(51-88) 142/53     Weight: 80.7 kg (178 lb)  Body mass index is 27.88 kg/m².     Physical Exam  Vitals reviewed.   Constitutional:       Appearance: Normal appearance.   HENT:      Head: Normocephalic and atraumatic.      Nose: Nose normal.      Mouth/Throat:      Mouth: Mucous membranes are moist.   Eyes:      Extraocular Movements: Extraocular movements intact.      Pupils: Pupils are equal, round, and reactive to light.   Pulmonary:      Effort: Pulmonary effort is normal.   Abdominal:      Palpations: Abdomen is soft.   Musculoskeletal:         General: Normal range of motion.      Cervical back: Normal range of motion and neck supple.   Skin:     General: Skin is warm and dry.      Capillary Refill: Capillary refill takes less than 2 seconds.      Comments: Left groin puncture with sheath. C/D/I  Right groin puncture site C/D/I without edema or induration.   Neurological:      Mental Status: He is alert and oriented to person, place, and time.      Comments: Sensation intact  Left facial droop.  Antigravity in all 4 extremities.   LUE good hand  but weaker then RUE     Psychiatric:         Mood and Affect: Mood normal.          NEUROLOGICAL EXAMINATION:     MENTAL STATUS    Oriented to person, place, and time.     CRANIAL NERVES     CN III, IV, VI   Pupils are equal, round, and reactive to light.      Significant Labs: CBC:   Recent Labs   Lab 07/17/24 2023 07/18/24  0023 07/18/24  0507 07/18/24  0824   WBC 15.68*  --  10.73  --    HGB 13.8* 13.4* 12.1* 12.0*   HCT 42.2 41.0* 37.2* 36.5*     --  245  --      CMP:   Recent Labs   Lab 07/17/24 2023 07/18/24  0506    136   K 4.4 4.6    104   CO2 23 21*   BUN 25.6 24.5   CREATININE 1.28* 1.10   CALCIUM 9.6 8.4*   MG  --  2.00   ALBUMIN 3.8 3.4   BILITOT 0.4 0.3   ALKPHOS 93 86   AST 15 52*   ALT 33 35       Significant Imaging: I have reviewed all pertinent imaging results/findings within the past 24 hours.

## 2024-07-18 NOTE — ED NOTES
code STEMI 1934. Pt arrived to ED he is dusky diaphoretic hx of MI 2 weeks ago was noncompliant with his blood thinner (berlinta). Per EMS pt call for crushing chest pain with syncope, Pt took 1 tab of home nitro, given 3 sprays in route, 1 inch nitro paste, 324 mg aspirin and 80 mcg fentanyl. He is awake slow to response but alert and oriented to situation. Wife in route.

## 2024-07-18 NOTE — CONSULTS
[facility] - Emergency Dept    Cardiology  History and physical    Patient Name: Jb Abbott  MRN: [id number]  Admission Date: 7/17/2024  Hospital Length of Stay: 0 days  Code Status: Prior   Attending Provider: Nancie Sterling MD   Consulting Provider: Vinicio Nieves MD  Primary Care Physician: No, Primary Doctor  Principal Problem:<principal problem not specified>    Patient information was obtained from patient and ER records.     Subjective:     Chief Complaint/Reason for Consult:  Inferior STEMI    HPI:  64-year-old male with prior history of RCA stent with recent NSTEMI underwent coronary angiogram 06/30/2024 and found to have 100% occluded RCA.  Subsequently angioplasty and treated with 2 overlapping drug-eluting stent.  Patient left AMA.  Tells me he lives in [place].  Could not afford any medications.  Did not take any aspirin or Brilinta following discharge from the hospital.    Admitted with chest pain.  Diaphoresis.  EKG reveals inferior wall STEMI.    PMH:  RCA stent  PSH:  Shoulder surgery  Family History:  CAD  Social History:  Tobacco and marijuana    Previous Cardiac Diagnostics:  Coronary angiogram and echocardiogram reviewed      Past Medical History:   Diagnosis Date    Chronic pain     Coronary artery disease     Diabetes mellitus     GERD (gastroesophageal reflux disease)     Hypercholesterolemia     Hypertension     NSTEMI (non-ST elevated myocardial infarction) 07/03/2024     Past Surgical History:   Procedure Laterality Date    APPENDECTOMY      CORONARY ANGIOPLASTY WITH STENT PLACEMENT      X4    LEFT HEART CATHETERIZATION N/A 6/30/2024    Procedure: Left heart cath;  Surgeon: Macario Mishra Jr., MD;  Location: [facility] CATH LAB;  Service: Cardiology;  Laterality: N/A;     Review of patient's allergies indicates:   Allergen Reactions    Penicillins Hives, Other (See Comments), Shortness Of Breath and Swelling    Rosuvastatin Other (See Comments)     No current  facility-administered medications on file prior to encounter.     Current Outpatient Medications on File Prior to Encounter   Medication Sig    aspirin (ECOTRIN) 81 MG EC tablet Take 1 tablet (81 mg total) by mouth once daily.    ciprofloxacin HCl (CIPRO) 500 MG tablet Take 1 tablet (500 mg total) by mouth every 12 (twelve) hours.    ezetimibe (ZETIA) 10 mg tablet Take 1 tablet (10 mg total) by mouth every evening.    metoprolol succinate (TOPROL-XL) 25 MG 24 hr tablet Take 0.5 tablets (12.5 mg total) by mouth once daily.    nicotine (NICODERM CQ) 14 mg/24 hr Place 1 patch onto the skin once daily.    nitroGLYCERIN (NITROSTAT) 0.4 MG SL tablet Place 1 tablet (0.4 mg total) under the tongue every 5 (five) minutes as needed for Chest pain.    pantoprazole (PROTONIX) 40 MG tablet Take 1 tablet (40 mg total) by mouth once daily.    ticagrelor (BRILINTA) 90 mg tablet Take 1 tablet (90 mg total) by mouth 2 (two) times daily.    valsartan (DIOVAN) 40 MG tablet Take 1 tablet (40 mg total) by mouth once daily.     Family History       Problem Relation (Age of Onset)    Diabetes Mother, Father    Heart disease Father    Hypertension Mother, Father    Stroke Mother          Tobacco Use    Smoking status: Every Day     Types: Cigarettes    Smokeless tobacco: Never   Substance and Sexual Activity    Alcohol use: Not Currently    Drug use: Never    Sexual activity: Not on file       Review of Systems   Constitutional:  Positive for diaphoresis.   Eyes: Negative.    Respiratory:  Positive for chest tightness and shortness of breath.    Cardiovascular:  Positive for chest pain.   Gastrointestinal: Negative.    Endocrine: Negative.    Genitourinary: Negative.    Musculoskeletal:  Positive for arthralgias and back pain.   Allergic/Immunologic: Negative.    Neurological: Negative.    Hematological: Negative.    Psychiatric/Behavioral: Negative.       Objective:     Vital Signs (Most Recent):  Temp: 97.4 °F (36.3 °C) (07/17/24  "2003)  Pulse: 80 (07/17/24 2020)  Resp: (!) 28 (07/17/24 2020)  BP: (!) 172/75 (07/17/24 2020)  SpO2: 98 % (07/17/24 2020) Vital Signs (24h Range):  Temp:  [97.4 °F (36.3 °C)] 97.4 °F (36.3 °C)  Pulse:  [76-85] 80  Resp:  [8-28] 28  SpO2:  [91 %-98 %] 98 %  BP: (113-172)/(51-83) 172/75   Weight: 80.7 kg (178 lb)  Body mass index is 27.88 kg/m².  SpO2: 98 %     No intake or output data in the 24 hours ending 07/17/24 2035  Lines/Drains/Airways       Peripheral Intravenous Line  Duration                  Peripheral IV - Single Lumen 18 G Anterior;Left;Proximal Forearm -- days         Peripheral IV - Single Lumen 07/17/24 2005 18 G Anterior;Proximal;Right Forearm <1 day                  Significant Labs:   Chemistries:   No results for input(s): "NA", "K", "CL", "CO2", "BUN", "CREATININE", "CALCIUM", "PROT", "BILITOT", "ALKPHOS", "ALT", "AST", "GLUCOSE", "MG", "PHOS", "TROPONINI" in the last 168 hours.    Invalid input(s): "LABALBU"     CBC/Anemia Labs: Coags:    No results for input(s): "WBC", "HGB", "HCT", "PLT", "MCV", "RDW", "IRON", "FERRITIN", "RETIC", "FOLATE", "[id number]", "OCCULTBLOOD" in the last 168 hours.    Invalid input(s): "IRONSATURATED" No results for input(s): "PT", "INR", "APTT" in the last 168 hours.     Significant Imaging:  Imaging Results              CTA Head and Neck (xpd) (In process)                      CT Head Without Contrast (In process)                   EKG:  Inferior wall STEMI      Physical Exam  Constitutional:       General: He is in acute distress.      Comments: Disoriented   HENT:      Mouth/Throat:      Mouth: Mucous membranes are dry.   Cardiovascular:      Rate and Rhythm: Normal rate and regular rhythm.      Pulses: Normal pulses.      Heart sounds: Normal heart sounds.   Pulmonary:      Breath sounds: Normal breath sounds.   Musculoskeletal:         General: Deformity and signs of injury present.      Cervical back: Normal range of motion.   Neurological:      Mental Status: " He is alert.      Comments: Unable to move left arm.  Unclear if due to a previous shoulder injury focal neurological deficit.  Moving both legs.   Psychiatric:         Mood and Affect: Mood normal.       Home Medications:   No current facility-administered medications on file prior to encounter.     Current Outpatient Medications on File Prior to Encounter   Medication Sig Dispense Refill    aspirin (ECOTRIN) 81 MG EC tablet Take 1 tablet (81 mg total) by mouth once daily. 30 tablet 11    ciprofloxacin HCl (CIPRO) 500 MG tablet Take 1 tablet (500 mg total) by mouth every 12 (twelve) hours. 10 tablet 0    ezetimibe (ZETIA) 10 mg tablet Take 1 tablet (10 mg total) by mouth every evening. 90 tablet 3    metoprolol succinate (TOPROL-XL) 25 MG 24 hr tablet Take 0.5 tablets (12.5 mg total) by mouth once daily. 45 tablet 3    nicotine (NICODERM CQ) 14 mg/24 hr Place 1 patch onto the skin once daily. 30 patch 2    nitroGLYCERIN (NITROSTAT) 0.4 MG SL tablet Place 1 tablet (0.4 mg total) under the tongue every 5 (five) minutes as needed for Chest pain. 25 tablet 2    pantoprazole (PROTONIX) 40 MG tablet Take 1 tablet (40 mg total) by mouth once daily. 90 tablet 3    ticagrelor (BRILINTA) 90 mg tablet Take 1 tablet (90 mg total) by mouth 2 (two) times daily. 60 tablet 11    valsartan (DIOVAN) 40 MG tablet Take 1 tablet (40 mg total) by mouth once daily. 90 tablet 3         Assessment:   Patient disoriented with focal neurological deficits unable to move his left arm.  Unclear if stroke versus shoulder injury  Inferior wall STEMI with multiple stents of the RCA.  History of medical noncompliance.  Unable to afford Brilinta.  History of prior RCA stent just subsequently occluded.  Recently revascularize 6/24 with 2 overlapping drug-eluting stents    Plan:   Patient loaded with aspirin.  Spoke with neuro interventionalist.  Reviewed CT head.  Concerns for occlusion of the right common carotid.  Plans for concomitant coronary  angiogram, as well as, cerebral angiogram  Will not use Effient due to contraindication in stroke  IV heparin  Emergent coronary angiogram      Thank you for your consult.     Vinicio Nieves MD  Cardiology  [facility] - Emergency Dept  07/17/2024

## 2024-07-18 NOTE — PLAN OF CARE
Problem: Wound  Goal: Optimal Coping  Outcome: Progressing  Goal: Optimal Functional Ability  Outcome: Progressing  Goal: Absence of Infection Signs and Symptoms  Outcome: Progressing  Goal: Improved Oral Intake  Outcome: Progressing  Goal: Optimal Pain Control and Function  Outcome: Progressing  Goal: Skin Health and Integrity  Outcome: Progressing  Goal: Optimal Wound Healing  Outcome: Progressing     Problem: Adult Inpatient Plan of Care  Goal: Plan of Care Review  Outcome: Progressing  Goal: Patient-Specific Goal (Individualized)  Outcome: Progressing  Goal: Absence of Hospital-Acquired Illness or Injury  Outcome: Progressing  Goal: Optimal Comfort and Wellbeing  Outcome: Progressing  Goal: Readiness for Transition of Care  Outcome: Progressing     Problem: Fall Injury Risk  Goal: Absence of Fall and Fall-Related Injury  Outcome: Progressing     Problem: Skin Injury Risk Increased  Goal: Skin Health and Integrity  Outcome: Progressing     Problem: Infection  Goal: Absence of Infection Signs and Symptoms  Outcome: Progressing     Problem: Stroke, Ischemic (Includes Transient Ischemic Attack)  Goal: Optimal Coping  Outcome: Progressing  Goal: Effective Bowel Elimination  Outcome: Progressing  Goal: Optimal Cerebral Tissue Perfusion  Outcome: Progressing  Goal: Optimal Cognitive Function  Outcome: Progressing  Goal: Improved Communication Skills  Outcome: Progressing  Goal: Optimal Functional Ability  Outcome: Progressing  Goal: Optimal Nutrition Intake  Outcome: Progressing  Goal: Effective Oxygenation and Ventilation  Outcome: Progressing  Goal: Improved Sensorimotor Function  Outcome: Progressing  Goal: Safe and Effective Swallow  Outcome: Progressing  Goal: Effective Urinary Elimination  Outcome: Progressing

## 2024-07-18 NOTE — BRIEF OP NOTE
Name: Jb Abbott  MRN: [id number]  Age: 64 y.o.  Gender: male    Date of Procedure: 07/17/2024    Pre-operative Diagnosis: Concern for right carotid artery occlusion    Post-operative Diagnosis: chronic occlusion of right carotid artery with collateral flow from right ECA and right PCOM. Concern for right femoral artery dissection.     Procedure: Cerebral angiogram     Access/Closure: Left femoral artery access. Sheath secured to skin. Right femoral sheath removed with hemostasis achieved with manual compression    Surgeon: Gabriel Dawson MD    Anesthesia: MAC turned into GA    EBL: moderate 100 cc    Description of findings:  - contrast extravasation after 6Fr right femoral sheath exchanged with a 8 Fr sheath over the glidewire  - right internal carotid artery occlusion likely chronic  - unsuccessful attempt at revascularizing right ICA due to chronic plaque  - collateral flow in right ICA from right ECA anastomoses and right posterior communicating artery  - no significant flow across ACOM  - manual compression of right groin after femoral sheath removal  - left femoral sheath secured to skin    Patient condition:   critical    Implant/specimen(s):  none    Plan:  - MAP>65. -160  - H&H q4h   - Admit in ICU for q1h neurochecks  - call vascular surgery Dr Lr if significant drop in hemoglobin or concern for hemodynamic instability. (Dr Lr aware)  - ok to start aggrastat gtt due to concern for right coronary artery occlusion  - left femoral sheath secured to skin in case needed for urgent surgical intervention  - remove left femoral sheath tomorrow 2 hrs after aggrastat infusion is stopped  - CTH in AM    Gabriel Dawson MD  Interventional Neurology

## 2024-07-18 NOTE — ANESTHESIA POSTPROCEDURE EVALUATION
Anesthesia Post Evaluation    Patient: Jb Abbott    Procedure(s) Performed: * No procedures listed *    Final Anesthesia Type: general      Patient location during evaluation: ICU  Patient participation: Yes- Able to Participate  Level of consciousness: awake and alert  Post-procedure vital signs: reviewed and stable  Pain management: adequate  Airway patency: patent  RAJNI mitigation strategies: Multimodal analgesia  PONV status at discharge: No PONV  Anesthetic complications: no      Cardiovascular status: blood pressure returned to baseline and hemodynamically stable  Respiratory status: unassisted and spontaneous ventilation  Hydration status: euvolemic  Follow-up not needed.          Vitals Value Taken Time   /67 07/18/24 1501   Temp 36.8 °C (98.2 °F) 07/18/24 1200   Pulse 91 07/18/24 1513   Resp 16 07/18/24 1513   SpO2 99 % 07/18/24 1513   Vitals shown include unfiled device data.      No case tracking events are documented in the log.      Pain/Moe Score: Pain Rating Prior to Med Admin: 7 (7/18/2024  1:07 PM)  Pain Rating Post Med Admin: 0 (7/18/2024  1:37 PM)  Moe Score: 10 (7/17/2024  9:31 PM)

## 2024-07-18 NOTE — CONSULTS
Vascular surgery consultation.      I was called into procedural for evaluation of the right groin access site.      Patient recently underwent right femoral access for PCI and was taken over to the neurointerventional suite for cerebral angiogram.      The 6 Romanian sheath was exchanged over the wire for an 8 Romanian sheath.  However at that time Dr. Dawson he took a picture and the sheath was outside artery.    There was an up and over Sebastian catheter for an angiogram of the right iliac and there was no contrast extravasation.  Dr. Dawson proceed with his procedure.    After cerebral angiogram, the iliac was again re-evaluated.  There was there was no contrast extravasation.  The right femoral sheath was removed.  Manual pressure was held for hemostasis.  There was no hematoma.    After a period of manual pressure an angiogram was performed which showed no contrast extravasation.      Patient was transferred to the intensive care unit afterwards.    We will planning for according to watch ensure that no hematoma.      We will need to trend hemoglobin q 4 hour.    Zachery Lr MD

## 2024-07-18 NOTE — ED NOTES
Pt unable to lift his left hand and report hx of prior injury, he is not able to lift left leg off stretcher see orders for CTA prior to Cath labs.

## 2024-07-18 NOTE — ED NOTES
Bedside hand off given to cath lab RN Radha, she is aware that pt wife was escorted to the cath lab waiting room and that CTA was completed prior transport, pending official reading.

## 2024-07-18 NOTE — ED PROVIDER NOTES
"Encounter Date: 7/17/2024    SCRIBE #1 NOTE: I, Patti House, am scribing for, and in the presence of,  Nancie Sterling MD. I have scribed the following portions of the note - the EKG reading. Other sections scribed: HPI, ROS, PE.       History     Chief Complaint   Patient presents with    Chest Pain     Pt arrives [facility], Pt arrive CP, diaphoretic, Elevation on EKG. MI 2 weeks ago with stents placed.      64 year old male with CAD, HTN, DM, hypercholesterolemia presenting to the ED due to chest pain onset 1 hour with accompanying syncopal episode and diaphoresis. He complains of severe chest tightness "like someone is sitting on my chest". Noted left arm weakness in transport - patient relates hx "nerve damage" to the arm.       En route, to the ED, the patient received 80 mcg, 3 sprays of nitro, 1 inch of nitro paste, and 324 mg of aspirin      Had been admitted late June w/ NSTEMI, underwent LHC w/ RCA stent. Hospitalization complicated by MSSA bacteremia. Started on antimicrobial treatment in hospital but ultimately left AMA.   Has only been taking aspirin since discharge - did not start brilinta.     The history is provided by the patient, medical records and the EMS personnel. No  was used.   Chest Pain  The current episode started 1 to 2 hours ago. The chest pain is currently at 8/10. The quality of the pain is described as tightness. Primary symptoms include syncope.   Associated symptoms include diaphoresis. He tried nitroglycerin for the symptoms. There are no known risk factors.   His past medical history is significant for CAD, diabetes, hypertension and MI.   Pertinent negatives for past medical history include no strokes.   Procedure history is positive for echocardiogram.     Review of patient's allergies indicates:   Allergen Reactions    Penicillins Hives, Other (See Comments), Shortness Of Breath and Swelling    Rosuvastatin Other (See Comments)     Past Medical History: "   Diagnosis Date    Chronic pain     Coronary artery disease     Diabetes mellitus     GERD (gastroesophageal reflux disease)     Hypercholesterolemia     Hypertension     NSTEMI (non-ST elevated myocardial infarction) 07/03/2024     Past Surgical History:   Procedure Laterality Date    APPENDECTOMY      CORONARY ANGIOPLASTY WITH STENT PLACEMENT      X4    LEFT HEART CATHETERIZATION N/A 6/30/2024    Procedure: Left heart cath;  Surgeon: Macario Mishra Jr., MD;  Location: [facility] CATH LAB;  Service: Cardiology;  Laterality: N/A;     Family History   Problem Relation Name Age of Onset    Hypertension Mother      Stroke Mother      Diabetes Mother      Hypertension Father      Heart disease Father      Diabetes Father       Social History     Tobacco Use    Smoking status: Every Day     Types: Cigarettes    Smokeless tobacco: Never   Substance Use Topics    Alcohol use: Not Currently    Drug use: Never     Review of Systems   Constitutional:  Positive for diaphoresis.   Cardiovascular:  Positive for chest pain and syncope.   Neurological:  Positive for syncope.       Physical Exam     Initial Vitals [07/17/24 2003]   BP Pulse Resp Temp SpO2   (!) 113/51 76 18 97.4 °F (36.3 °C) (!) 91 %      MAP       --         Physical Exam    Nursing note and vitals reviewed.  Constitutional: He appears lethargic. He is diaphoretic. He appears ill. He appears distressed. Nasal cannula in place.   HENT:   Head: Normocephalic and atraumatic.   Mouth/Throat: Oropharynx is clear and moist.   Eyes: Conjunctivae are normal.   Neck: Neck supple.   Normal range of motion.  Cardiovascular:  Normal rate and regular rhythm.           Pulmonary/Chest: Breath sounds normal. No respiratory distress. He exhibits no tenderness.   Abdominal: Abdomen is soft. Bowel sounds are normal. He exhibits no distension. There is no abdominal tenderness.   Musculoskeletal:         General: Normal range of motion.      Cervical back: Normal range of motion and  neck supple.      Lumbar back: Normal. No tenderness. Normal range of motion.     Neurological: He is oriented to person, place, and time. He appears lethargic. A sensory deficit (decreased sensation to light touch LUE) is present.   Minimal movement with left upper extremity.   Sensations intact with lower extremities, can minimally lift LLE of bed but cannot sustain against gravity  5/5 strength RUE/RLE   Skin: Skin is warm. There is pallor.          ED Course   Critical Care    Date/Time: 7/17/2024 8:02 PM    Performed by: Nancie Sterling MD  Authorized by: Nancie Sterling MD  Direct patient critical care time: 21 minutes  Additional history critical care time: 6 minutes  Ordering / reviewing critical care time: 4 minutes  Documentation critical care time: 4 minutes  Consulting other physicians critical care time: 8 minutes  Total critical care time (exclusive of procedural time) : 43 minutes  Critical care time was exclusive of separately billable procedures and treating other patients.  Critical care was necessary to treat or prevent imminent or life-threatening deterioration of the following conditions: circulatory failure and CNS failure or compromise.  Critical care was time spent personally by me on the following activities: development of treatment plan with patient or surrogate, discussions with consultants, interpretation of cardiac output measurements, examination of patient, obtaining history from patient or surrogate, ordering and performing treatments and interventions, ordering and review of laboratory studies, ordering and review of radiographic studies, pulse oximetry and review of old charts.        Results for orders placed or performed during the hospital encounter of 07/17/24   EKG 12-lead   Result Value Ref Range    QRS Duration 86 ms    OHS QTC Calculation 454 ms    Narrative    Test Reason : I21.11,    Vent. Rate : 088 BPM     Atrial Rate : 088 BPM     P-R Int : 138 ms          QRS Dur  : 086 ms      QT Int : 376 ms       P-R-T Axes : 074 066 057 degrees     QTc Int : 454 ms    Normal sinus rhythm  Normal ECG  When compared with ECG of 17-JUL-2024 20:05,  ST no longer elevated in Inferior leads  ST no longer depressed in Lateral leads  QT has lengthened  Confirmed by Ezekiel Esparza MD ([id number]) on 7/18/2024 10:36:20 AM    Referred By:             Confirmed By:Ezekiel Esparza MD       EKG  7/17/24 @ 2005:   NSR 68 w/ sinus arrhythmia, inferior ST elevation (less pronounced than pre hospital EKG)     Labs Reviewed   TROPONIN ISTAT         Imaging Results              CTA Head and Neck (xpd) (Preliminary result)  Result time 07/17/24 21:44:22      Preliminary result by Mo Rich Jr., MD (07/17/24 21:44:22)                   Narrative:    START OF REPORT:  Technique: CT angiogram of the intracranial vessels was performed with intravenous contrast with direct axial as well as sagittal and coronal reformations. CT angiogram of the neck vessels was performed with intravenous contrast with direct axial as well as sagittal and coronal reformations.    Comparison: None.    Clinical history: Codestemi;however,presentswithleftsidedeficitsandweakness.    Findings:  Intracranial Vascular structures:  Internal carotid arteries: Atheromatous calcification of the cavernous segments of the bilateral internal carotid arteries is seen. Absent contrast enhancement in the entire right internal carotid artery, which is consistent with total occlusion. The left internal carotid artery is unremarkable without stenosis.  Middle cerebral arteries: There is reconstitution of flow in the right middle cerebral artery, likely from collateral formation. Mild to moderate attenuation of flow is seen from the mid M1 segment to the distal segments of the right middle cerebral artery. The left middle cerebral artery is unremarkable.  Anterior cerebral arteries: Reconstitution of flow is seen in the portion of the A1 segment of the  right anterior cerebral artery. Minimal contrast flow is also seen in the A2 and distal segments of the right anterior cerebral artery, exhibting a beaded appearance. The left anterior cerebral artery is unremarkable. The anterior communicating artery is not identified.  Vertebral arteries: The right vertebral artery is dominant. The left vertebral artery is hypoplastic and terminates as the posterior inferior cerebellar artery.  Basilar artery: Unremarkable.  Posterior cerebral arteries: Mild stenosis in the P2 segment of the right posterior cerebral artery. The left posterior cerebral artery is unremarkable.  Posterior communicating arteries: Unremarkable.  Neck Vascular structures: Mild atheromatous calcification with is seen at the origin of the bifurcation of the common carotid arteries.  Common carotid arteries:  Right common carotid artery: Mild stenosis of the proximal segment of the right common carotid artery is seen.  Left common carotid artery: Unremarkable.  Internal carotid artery: There is absent contrast enhancement in the entire length of the right internal carotid artery, consistent with total occlusion.  Vertebral arteries: Right vertebral artery is dominant. The left vertebral artery is hypoplastic.    Miscellaneous: Spondylosis in the cervical spine and multilevel disc disease. Nonspecific nondependent densities in the bilteral lung apices.    Notifications: The results were discussed with the emergency room physician Dr Sterling prior to dictation at 2024-07-17 21:43:10 CDT.      Impression:  1. Mild atheromatous calcification with is seen at the origin of the bifurcation of the common carotid arteries.  2. Mild stenosis of the proximal segment of the right common carotid artery is seen.  3. Absent contrast enhancement in the entire right internal carotid artery, which is consistent with total occlusion.  4. There is reconstitution of flow in the right middle cerebral artery, likely from  collateral formation. Mild to moderate attenuation of flow is seen from the mid M1 segment to the distal segments of the right middle cerebral artery.  5. Reconstitution of flow is seen in the portion of the A1 segment of the right anterior cerebral artery. Minimal contrast flow is also seen in the A2 and distal segments of the right anterior cerebral artery, exhibting a beaded appearance.  6. Mild stenosis in the P2 segment of the right posterior cerebral artery.  7. There is absent contrast enhancement in the entire length of the right internal carotid artery, consistent with total occlusion.  8. Details and other findings as described above.                                         CT Head Without Contrast (Preliminary result)  Result time 07/17/24 21:03:09      Preliminary result by Mo Rich Jr., MD (07/17/24 21:03:09)                   Narrative:    START OF REPORT:  Technique: CT of the head was performed without intravenous contrast with direct axial as well as coronal and sagittal reconstruction images.    Comparison: None.    Clinical history: Codestemi;however,presentswithleftsidedeficitsandweakness. Codestemi;however,presentswithleftsidedeficitsandweakness.    FINDINGS:  Hemorrhage: No acute intracranial hemorrhage is seen.  CSF spaces: The ventricles sulci and basal cisterns are within normal limits.  Brain parenchyma: Well-defined hypodense foci are seen in the right frontal lobe (series 2 image 33 and 40), representing chronic infarcts.  Cerebellum: Unremarkable.  Vascular: Mild atheromatous calcification of the intracranial arteries is seen.  Sella and skull base: Unremarkable.  Intracranial calcifications: Incidental note is made of bilateral choroid plexus calcification. Incidental note is made of some pineal region calcification.  Calvarium: No acute linear or depressed skull fracture is seen.    Maxillofacial Structures:  Paranasal sinuses: The visualized paranasal sinuses appear clear  with no significant mucoperiosteal thickening or air fluid levels identified.  Orbits: Unremarkable.  Zygomatic arches: Unremarkable.  Temporal bones and mastoids: Unremarkable.  TMJ: The mandibular condyles appear normally placed with respect to the mandibular fossa.      Impression:  1. No acute intracranial process identified. Details and findings as noted above.                                         Medications   0.9%  NaCl infusion ( Intravenous New Bag 7/18/24 0014)   acetaminophen tablet 650 mg (has no administration in time range)   HYDROcodone-acetaminophen 5-325 mg per tablet 1 tablet (has no administration in time range)   morphine injection 2 mg (has no administration in time range)   ondansetron disintegrating tablet 8 mg (has no administration in time range)   clopidogreL tablet 75 mg (has no administration in time range)   tirofiban 12.5 mg in sodium chloride 0.9% 250 mL infusion (has no administration in time range)   hydrALAZINE injection 10 mg (10 mg Intravenous Given 7/18/24 0017)   labetaloL injection 10 mg (has no administration in time range)   mupirocin 2 % ointment (has no administration in time range)   glucagon (human recombinant) injection 1 mg (has no administration in time range)   insulin aspart U-100 injection 0-5 Units (has no administration in time range)   dextrose 10% bolus 125 mL 125 mL (has no administration in time range)   dextrose 10% bolus 250 mL 250 mL (has no administration in time range)   iohexoL (OMNIPAQUE 350) injection 85 mL (85 mLs Intravenous Given 7/17/24 2029)   iopamidoL (ISOVUE-370) injection 100 mL (100 mLs Other Given 7/17/24 2338)     Medical Decision Making  Problems Addressed:  Acute ST elevation myocardial infarction (STEMI) of inferior wall: acute illness or injury  Right carotid artery occlusion: acute illness or injury    Amount and/or Complexity of Data Reviewed  Labs: ordered.  Radiology: ordered.  ECG/medicine tests: ordered and independent  "interpretation performed.    Risk  Prescription drug management.  Decision regarding hospitalization.      ED assessment:    Mr. Abbott presented w/ chest pain in the setting of recent MI w/ stent, noncompliant with antiplatelets after leaving AMA.   Also w/ new L sided weakness exacerbating chronic weakness he relates as "nerve injury" to the left shoulder.     Differential diagnosis (including but not limited to):   MI, UA, aortic dissection, acute CVA, LVO, medication non-adherence, syncope    ED management:   STEMI noted on EKG. CODE STEMI activated prehospital and Dr. Nieves was present in the ED shortly after patient arrival and agreed to proceed with emergent angiography. W/ concern for new neuro deficits, requested CT head prior to procedure. CT/CTA obtained and patient transported to cath lab.  On my review of the imaging at 8:34 pm, noted right carotid occlusion. I contacted on call interventional neurologist who agreed to come out for cerebral angiography. (Patient with hx SDH in 2018).     I went to cath lab and discussed the additional findings with both Dr. Nieves as well as Dr. Dawson (via phone) who agreed to sequential cath lab procedures.     Case reviewed with ICU resident who accepts for admission.     My independent radiology interpretation:   CT head: no acute intracranial hemorrhage  CTA head/neck: right carotid occlusion    Amount and/or Complexity of Data Reviewed  Independent historian: EMS   Summary of history: hx provided by EMS as documented above - pt w/ CP/syncope in setting of recent MI w/ stents  External data reviewed: notes from previous admissions, prescription medications , and previous procedure and OR notes  Summary of data reviewed: admitted late June w/ NSTEMI, underwent LHC for 100% RCA occlusion w/ stent placement, on planned day of discharge, noted febrile, MSSA bacteremia. Ultimately left AMA.   Risk and benefits of testing: discussed   Labs: ordered and " reviewed  Radiology: ordered and independent interpretation performed (see above or ED course)  ECG/medicine tests: ordered and independent interpretation performed (see above or ED course)  Discussion of management or test interpretation with external provider(s): discussed with stroke neurology, interventional cardiology, critical care medicine consultant   Summary of discussion: as above    Risk  Decision regarding hospitalization  Shared decision making     Critical Care  30-74 minutes     Nancie GARDUNO MD personally performed the history, PE, MDM, and procedures as documented above and agree with the scribe's documentation.           Scribe Attestation:   Scribe #1: I performed the above scribed service and the documentation accurately describes the services I performed. I attest to the accuracy of the note.    Attending Attestation:           Physician Attestation for Scribe:  Physician Attestation Statement for Scribe #1: Hallie GARDUNO Kayla O, MD, reviewed documentation, as scribed by Patti House in my presence, and it is both accurate and complete.             ED Course as of 07/18/24 0018   Wed Jul 17, 2024 2144 Discussed with radiology [KS]      ED Course User Index  [KS] Nancie Sterling MD                           Clinical Impression:  Final diagnoses:  [I21.19] Acute ST elevation myocardial infarction (STEMI) of inferior wall (Primary)  [I65.21] Right carotid artery occlusion          ED Disposition Condition    Admit Stable                Nancie Sterling MD  07/18/24 [id number]

## 2024-07-18 NOTE — OP NOTE
[facility]                       [address] [address]     PATIENT NAME:Jb Abbott    YOB: 1960      DATE OF SURGERY:7/17/2024      SURGEON:  Gabriel Dawson MD  Assistant surgeon: Dr Lr (Vascular Surgery)     PREOPERATIVE DIAGNOSIS:  concern for right Internal carotid artery occlusion     POSTOPERATIVE DIAGNOSIS:  chronic right internal carotid artery occlusion     PROCEDURE PERFORMED:    Cerebral angiogram with catheter insertion in the following arteries:  Right common carotid, right subclavian, right vertebral, left common carotid, left internal carotid  Interpretation of images  Ultrasound-guided left femoral artery access       LEVEL OF SEDATION:  MAC turned to GA     TOTAL INTRASERVICE SEDATION TIME:  90 minutes.     COMPLICATIONS:  None.    APPROACH:  Right femoral artery  Left femoral artery      VESSELS SELECTIVELY CATHETERIZED:   Right common carotid artery   Left common carotid artery  Left internal carotid artery   Right subclavian artery   Right vertebral artery      DEVICES USED:  8 Fr short sheath  5 Fr Angled catheter, 5 Fr [employer]  Aviator 4.5 x 40 mm balloon  Walrus balloon guide catheter  Synchro microwire  035 glidewire, exchange length glidewire  Micropuncture kit       INDICATION:  64 y.o. years old male with a History of recent NSTEMI status post coronary stenting and angioplasty in June 20, 2024 presented with chest pain, syncopal event and diaphoresis.  Was noted to have STEMI.  Also noted to have left-sided weakness.  NIHSS noted to be 9 at that time. CTA demonstrated concern for right internal carotid artery occlusion.  He underwent coronary artery revascularization with balloon angioplasty in the right main coronary artery.  Following which he underwent cerebral angiogram to evaluate for the right ICA occlusion.  Findings were discussed with the patient's wife who reported  that patient did not have any significant weakness of the left side but had nerve pain and mobility was limited because of pain.   However did not have history of complete carotid artery occlusion of the right side.  Given the imaging findings and severe neurological deficits,   There was indication for endovascular revascularization.    DETAILED DESCRIPTION:      Risks/benefits were thoroughly reviewed with patient/family prior to the procedure.  Risks inclusive but not limited to death, stroke, contrast reaction/nephropathy, access/vascular injury, and other unforeseen risks.  Patient/family provided informed consent.  Patient supine on the angio table, groin prepped and draped in routine fashion.  Moderate conscious sedation was administered along with local anesthesia.        The glidewire was introduced in the 6 Chadian right femoral sheath and advanced to the origin under continuous fluoroscopy.  The 6 Chadian sheath was removed and an 8 Chadian sheath was slowly introduced and advanced over the wire.  The femoral injection demonstrated contrast extravasation in the subcutaneous tissue.  No evidence of retroperitoneal hematoma noted.  The sheath was left in the right groin.  Hemodynamically patient was stable.    The Left femoral artery was sonographically evaluated and judged to be patent.  Real-time ultrasound was used to visualize needle entry into the vessel and a permanent image was stored. Using a micropuncture kit modified Seldinger technique, an arterial sheath was placed in the  left femoral artery.       Sebastian catheter telescoping over the glidewire were introduced in the left femoral sheath and advanced to the arch.  The right iliac artery injection was obtained which demonstrated no evidence of contrast extravasation from the right femoral artery.  Thereafter mucus and catheter was removed.    Dr. Lr from vascular surgery was immediately consulted and updated about the findings and requested to  assist in the cath lab.    Diagnostic catheter telescoping were glidewire were introduced through the left femoral sheath and advanced to the arch and double flushed. Enumerated vessels were then selectively catheterized and angiograms obtained as listed below.  Multiple cervical and intracranial runs were obtained in AP and lateral projection.  The films were reviewed and determined to be of good diagnostic quality.        The cervical and cranial injections demonstrated total occlusion of the right internal carotid artery at the origin with collateral flow in the right hemisphere via the right external carotid artery anastomosis and right posterior communicating artery.  There was concern for emboli in the right LINN and right ICA terminus with sluggish flow noted in the right MCA territory.  There was no significant flow noted across the anterior communicating artery in the right LINN territory from the contralateral internal carotid artery injection.      Thereafter, [place] guide catheter telescoping over balancing diagnostic catheter and 035 glidewire were introduced through the left femoral sheath and advanced to the origin double flushed.  The right common carotid artery was selectively catheterized.  Attempt was made to advance a microwire and a glidewire through the occluded segment into the right internal carotid artery.  However this was unsuccessful likely due to chronic occlusion of the right internal carotid artery.  At this point decision was made to abort the procedure due to risk of distal emboli and further worsening of neurological deficits.  The guide catheter was removed.      Thereafter, Dr. Lr  assisted with management of the right femoral sheath.  Multiple injections were obtained from the right iliac artery that did not demonstrate any active contrast extravasation or obvious dissection.  Sheath was slowly removed and manual compression was applied for hemostasis for over 20 minutes.   Intermittent injections were obtained to assess patency of the right femoral artery.      There was no evidence of contrast extravasation or dissection from the right femoral artery noted on the final injection.  At this point decision was made to abort the procedure and transferred him to the ICU.  The left femoral sheath was secured to skin for potential surgery in case if the patient decompensates hemodynamically.    Angiograms performed and findings:    Right femoral artery:    Contrast extravasation in the subcutaneous tissue without evidence of retroperitoneal hematoma likely due to femoral artery dissection.  Right iliac artery: Selective injection from the right iliac artery demonstrated no evidence of active contrast extravasation from the right femoral artery.  Right common carotid artery -cervical:  Evidence of irregularity throughout the right common carotid artery.  The right external carotid artery and its branches appear patent.  The right internal carotid artery is not visualized from the origin.  This is likely due to complete occlusion of the right internal carotid artery.  Right common carotid artery - cranial:  Evidence of anastomosis between the right external carotid artery branches and the right supraclinoid ICA resulting in opacification of the right MCA and LINN.  There is concern for nonocclusive thrombus in the right A1 -A2 segment.  Right subclavian artery -cervical:  Unremarkable origin and cervical course of the right vertebral artery.    Right vertebral artery - cranial:  Unremarkable extradural and intradural segments of the right vertebral artery.  Right PICA, bilateral AICA, SCA, PCA visualized.  There is faint opacification of the posterior communicating artery resulting in opacification of the right LINN and MCA territory.  Left common carotid artery - cervical:  Unremarkable left common carotid artery.  Evidence of atherosclerotic plaque in the proximal segment of the left internal  carotid artery resulting in mild stenosis.  Left internal carotid artery - cranial:  Unremarkable extracranial and intracranial segments of the left internal carotid artery.  Normal flow and branching noted in left LINN and MCA territory.  No cross-filling across the anterior communicating artery into the contralateral LINN noted.    Selective injection from the right iliac artery demonstrates patency of the right iliac and femoral arteries without evidence of contrast extravasation or dissection.  Selective injection of the right iliac artery demonstrates patency of the right femoral artery without evidence of contrast extravasation.  Right common carotid artery - cervical:  Multiple attempts were made to cross the occluded segment using microwire and glidewire but were unsuccessful.  Persistent occlusion of the right internal carotid artery without opacification of the distal segments noted.  Aorta:  Injection from the distal aortic segment demonstrates irregularity noted throughout the distal aorta and bilateral iliac arteries.  No evidence of contrast extravasation from the right femoral artery noted.  Aorta:  Following sheath removal and manual compression:  There is sluggish flow noted through the right iliac artery.    Aorta:   injection obtained while  applying gentle compression of the right femoral artery  demonstrates no evidence of contrast extravasation or dissection of the right femoral artery.    Aorta:  Similar injection obtained while performing applying gentle compression of the right femoral artery did not demonstrate any contrast extravasation.  Aorta:  After applying manual compression for 20 minutes, injection did not demonstrate any evidence of contrast extravasation.  Left femoral artery:  Small caliber of the left iliac artery with evidence of atherosclerotic disease.  Sheath insertion in the left common femoral artery above the femoral bifurcation.  No evidence of contrast extravasation is  noted.      OVERALL IMPRESSION:    Concern for right femoral artery dissection resulting in contrast extravasation.  Hemostasis obtained with manual compression with no evidence of contrast extravasation from the contralateral injection.  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  Unsuccessful attempt at access in the right internal carotid artery.    Collateral flow in the right hemisphere via right external carotid artery anastomosis and right posterior communicating artery.  No significant flow noted across the anterior communicating artery segment.  Concern for   Nonocclusive emboli in the right anterior cerebral artery.        ______________________________  Gabriel Dawson MD  Vascular and Interventional Neurology

## 2024-07-18 NOTE — PLAN OF CARE
07/18/24 1251   Discharge Assessment   Assessment Type Discharge Planning Assessment   Confirmed/corrected address, phone number and insurance Yes   Confirmed Demographics Correct on Facesheet   Source of Information patient;family   Communicated ORION with patient/caregiver Date not available/Unable to determine   Reason For Admission ST elevation MI   People in Home spouse   Facility Arrived From: home   Do you expect to return to your current living situation? Yes   Do you have help at home or someone to help you manage your care at home? Yes   Who are your caregiver(s) and their phone number(s)? Jackeline persaud and son in law   Prior to hospitilization cognitive status: Alert/Oriented   Current cognitive status: Alert/Oriented   Walking or Climbing Stairs Difficulty no   Dressing/Bathing Difficulty no   Equipment Currently Used at Home none   Patient currently being followed by outpatient case management? No   Do you currently have service(s) that help you manage your care at home? No   Do you take prescription medications? Yes   Do you have prescription coverage? Yes   Coverage [employer]   Do you have any problems affording any of your prescribed medications? No   Who is going to help you get home at discharge? family   How do you get to doctors appointments? family or friend will provide   Are you on dialysis? No   Do you take coumadin? No   Discharge Plan A Home with family;Home   Discharge Plan B Home Health   DME Needed Upon Discharge  none   Discharge Plan discussed with: Patient;Spouse/sig other   Name(s) and Number(s) Jackeline persaud   Transition of Care Barriers None   OTHER   Name(s) of People in Home Jackeline persaud and son in law     Patient lives with his wife and son in law. He is mostly independent. Wife works for home health agency. Spoke to wife over the phone about home health, but she does not want it at this time. Told her is she changes her mind to let us know.

## 2024-07-18 NOTE — PT/OT/SLP PROGRESS
Occupational Therapy      Patient Name:  Jb Abbott   MRN:  [id number]    Patient not seen today secondary to being on bedrest; plan for L femoral sheath removal tomorrow per RN. Will follow-up as appropriate.    7/18/2024

## 2024-07-18 NOTE — ANESTHESIA PREPROCEDURE EVALUATION
07/17/2024  Jb Abbott is a 64 y.o., male.      Pre-op Assessment    I have reviewed the Patient Summary Reports.     I have reviewed the Nursing Notes. I have reviewed the NPO Status.   I have reviewed the Medications.     Review of Systems  Anesthesia Hx:  No problems with previous Anesthesia                Social:  Smoker, Recreational Drugs       Hematology/Oncology:  Hematology Normal   Oncology Normal                                   EENT/Dental:  EENT/Dental Normal           Cardiovascular:     Hypertension  Past MI CAD   CABG/stent                                     Pulmonary:  Pulmonary Normal                       Renal/:  Renal/ Normal                 Hepatic/GI:     GERD             Musculoskeletal:  Musculoskeletal Normal                Neurological:   CVA                                    Endocrine:  Diabetes           Dermatological:  Skin Normal    Psych:  Psychiatric Normal                Physical Exam  General: Well nourished, Cooperative, Alert and Oriented    Airway:  Mallampati: II   Mouth Opening: Normal  TM Distance: Normal  Tongue: Normal  Neck ROM: Normal ROM    Dental:  Intact    Heart:  Rate: Normal    Anesthesia Plan  Type of Anesthesia, risks & benefits discussed:    Anesthesia Type: Gen ETT  Intra-op Monitoring Plan: Standard ASA Monitors  Post Op Pain Control Plan: multimodal analgesia  Induction:  IV  Airway Plan: Direct  ASA Score: 4 Emergent  Day of Surgery Review of History & Physical: H&P Update referred to the surgeon/provider.I have interviewed and examined the patient. I have reviewed the patient's H&P dated:     Ready For Surgery From Anesthesia Perspective.   .

## 2024-07-18 NOTE — PROGRESS NOTES
[facility] - 7th Floor ICU  Neurology  Progress Note    Patient Name: Jb Abbott  MRN: [id number]  Admission Date: 7/17/2024  Hospital Length of Stay: 1 days  Code Status: Full Code   Attending Provider: Mark Padilla MD  Primary Care Physician: No, Primary Doctor   Principal Problem:ST elevation myocardial infarction involving right coronary artery    HPI:   Patient is a 64 y.o. male presents with chest pain, diaphoresis, syncopal event that started about 1 hr ago. He has a history of recent coronary angiogram where he was found to have right coronary artery occlusion and required angioplasty/stenting. However patient left AMA and did not take his antiplatelets. On arrival to ED, he was also noted to have left sided weakness regarding which he reported that he has left shoulder injury with nerve damage. CTH, CTA showed no acute infarct but showed a right ICA occlusion at the origin. NIHSS was noted to be 9 while in the ED. He was found to have Acute MI based on the initial work up and was taken stat to cardiac cath lab for coronary revascularization.      Post coronary angioplasty, patient was alert, oriented, moving LUE antigravity but weaker than the right side. Able to lift the LLE antigravity. SBP in 160s at that time. After discussing with wife, patient was brought for cerebral angiogram and possible intervention. Per wife, patient did not have any baseline weakness and was able to use his left arm despite some nerve pain in the forearm.        Overview/Hospital Course:  No notes on file        Subjective:     Interval History: No acute events overnight. Left groin sheath in place without hematoma. Right groin site without edema or swelling. Patient complained of right lower quadrant abdominal pain. CT abdomen ordered per Intensivist. Vascular Surgery following. Repeat CT head ordered.   Patient is alert and oriented. Left facial droop. LUE antigravity but weaker than the RUE. Good  hand .     Current Neurological Medications:     Current Facility-Administered Medications   Medication Dose Route Frequency Provider Last Rate Last Admin    0.9%  NaCl infusion   Intravenous Continuous Vinicio Nieves  mL/hr at 07/18/24 0014 New Bag at 07/18/24 0014    0.9%  NaCl infusion   Intravenous Continuous Dona Ang B, NP        acetaminophen tablet 650 mg  650 mg Oral Q4H PRN Vinicio Nieves MD        aspirin EC tablet 81 mg  81 mg Oral Daily Vinicio Nieves MD   81 mg at 07/18/24 0834    bisacodyL suppository 10 mg  10 mg Rectal Daily PRN Dona Ang, NP        clopidogreL tablet 75 mg  75 mg Oral Daily Vinicio Nieves MD   75 mg at 07/18/24 0834    dextrose 10% bolus 125 mL 125 mL  12.5 g Intravenous PRN Mhaendra Herrera,         dextrose 10% bolus 250 mL 250 mL  25 g Intravenous PRN Mahendra Herrera DO        glucagon (human recombinant) injection 1 mg  1 mg Intramuscular PRN Mahendra Herrera DO        hydrALAZINE injection 10 mg  10 mg Intravenous Q4H PRN Mahendra Herrera,    10 mg at 07/18/24 0017    HYDROcodone-acetaminophen 5-325 mg per tablet 1 tablet  1 tablet Oral Q4H PRN Vinicio Nieves MD        insulin aspart U-100 injection 0-5 Units  0-5 Units Subcutaneous Q6H PRN Mahendra Herrera,         labetaloL injection 10 mg  10 mg Intravenous Q4H PRN Mahendra Herrera DO        morphine injection 2 mg  2 mg Intravenous Q4H PRN Vinicio Nieves MD   2 mg at 07/18/24 0556    mupirocin 2 % ointment   Nasal BID Mark Padilla MD   Given at 07/18/24 0834    ondansetron disintegrating tablet 8 mg  8 mg Oral Q8H PRN Vinicio Nieves MD        pantoprazole injection 40 mg  40 mg Intravenous Daily Mahendra Herrera,    40 mg at 07/18/24 0834    tirofiban 12.5 mg in sodium chloride 0.9% 250 mL infusion  0.15 mcg/kg/min Intravenous Continuous Vinicio Nieves MD 14.5 mL/hr at 07/18/24 0023 0.15 mcg/kg/min at 07/18/24 0023       Review of Systems  Objective:     Vital Signs (Most Recent):  Temp: 98.7 °F (37.1  °C) (07/18/24 0400)  Pulse: 93 (07/18/24 0900)  Resp: 16 (07/18/24 0900)  BP: 116/67 (07/18/24 0900)  SpO2: 97 % (07/18/24 0900) Vital Signs (24h Range):  Temp:  [97.4 °F (36.3 °C)-98.7 °F (37.1 °C)] 98.7 °F (37.1 °C)  Pulse:  [76-93] 93  Resp:  [8-28] 16  SpO2:  [91 %-100 %] 97 %  BP: (102-179)/() 116/67  Arterial Line BP: (130-210)/(51-88) 142/53     Weight: 80.7 kg (178 lb)  Body mass index is 27.88 kg/m².     Physical Exam  Vitals reviewed.   Constitutional:       Appearance: Normal appearance.   HENT:      Head: Normocephalic and atraumatic.      Nose: Nose normal.      Mouth/Throat:      Mouth: Mucous membranes are moist.   Eyes:      Extraocular Movements: Extraocular movements intact.      Pupils: Pupils are equal, round, and reactive to light.   Pulmonary:      Effort: Pulmonary effort is normal.   Abdominal:      Palpations: Abdomen is soft.   Musculoskeletal:         General: Normal range of motion.      Cervical back: Normal range of motion and neck supple.   Skin:     General: Skin is warm and dry.      Capillary Refill: Capillary refill takes less than 2 seconds.      Comments: Left groin puncture with sheath. C/D/I  Right groin puncture site C/D/I without edema or induration.   Neurological:      Mental Status: He is alert and oriented to person, place, and time.      Comments: Sensation intact  Left facial droop.  Antigravity in all 4 extremities.   LUE good hand  but weaker then RUE     Psychiatric:         Mood and Affect: Mood normal.          NEUROLOGICAL EXAMINATION:     MENTAL STATUS   Oriented to person, place, and time.     CRANIAL NERVES     CN III, IV, VI   Pupils are equal, round, and reactive to light.      Significant Labs: CBC:   Recent Labs   Lab 07/17/24 2023 07/18/24  0023 07/18/24  0507 07/18/24  0824   WBC 15.68*  --  10.73  --    HGB 13.8* 13.4* 12.1* 12.0*   HCT 42.2 41.0* 37.2* 36.5*     --  245  --      CMP:   Recent Labs   Lab 07/17/24 2023 07/18/24  3376     136   K 4.4 4.6    104   CO2 23 21*   BUN 25.6 24.5   CREATININE 1.28* 1.10   CALCIUM 9.6 8.4*   MG  --  2.00   ALBUMIN 3.8 3.4   BILITOT 0.4 0.3   ALKPHOS 93 86   AST 15 52*   ALT 33 35       Significant Imaging: I have reviewed all pertinent imaging results/findings within the past 24 hours.  Assessment and Plan:     Cerebral infarction due to internal carotid artery occlusion, right  Presented with syncope, diaphoresis, CP, and left-sided weakness  -CTA H/N concerning for right Internal carotid artery occlusion   S/P - unsuccessful attempt at revascularizing right ICA due to chronic plaque  - collateral flow in right ICA from right ECA anastomoses and right    Thrombectomy delayed due to stabilizing measures    Plan:    Repeat CT head ordered.  Continue DAPT.   Aggrastat per Cardiology.  - - MAP>65. -160  - H&H q4h   -ok for neuro checks every 2 hours.  - call vascular surgery Dr Lr if significant drop in hemoglobin or concern for hemodynamic instability. (Dr Lr aware)  - left femoral sheath secured to skin in case needed for urgent surgical intervention  - remove left femoral sheath tomorrow 2 hrs after aggrastat infusion is stopped    Further recommendations per MD.        VTE Risk Mitigation (From admission, onward)           Ordered     Reason for No Pharmacological VTE Prophylaxis  Once        Comments: Patient on Aggrastat gtt   Question:  Reasons:  Answer:  Physician Provided (leave comment)    07/18/24 0846     IP VTE HIGH RISK PATIENT  Once         07/18/24 0846     Place sequential compression device  Until discontinued         07/18/24 0846                    Dona Ang NP  Neurology  [facility] - 7th Floor ICU

## 2024-07-18 NOTE — CONSULTS
Interventional Neurology Consult Note    Consult Requested by:  Dr Sterling  Reason for Consult:  right ICA occlusion  SUBJECTIVE:     History of Present Illness:  Patient is a 64 y.o. male presents with chest pain, diaphoresis, syncopal event that started about 1 hr ago. He has a history of recent coronary angiogram where he was found to have right coronary artery occlusion and required angioplasty/stenting. However patient left AMA and did not take his antiplatelets. On arrival to ED, he was also noted to have left sided weakness regarding which he reported that he has left shoulder injury with nerve damage. CTH, CTA showed no acute infarct but showed a right ICA occlusion at the origin. NIHSS was noted to be 9 while in the ED. He was found to have Acute MI based on the initial work up and was taken stat to cardiac cath lab for coronary revascularization.     Post coronary angioplasty, patient was alert, oriented, moving LUE antigravity but weaker than the right side. Able to lift the LLE antigravity. SBP in 160s at that time. After discussing with wife, patient was brought for cerebral angiogram and possible intervention. Per wife, patient did not have any baseline weakness and was able to use his left arm despite some nerve pain in the forearm.     Review of patient's allergies indicates:   Allergen Reactions    Penicillins Hives, Other (See Comments), Shortness Of Breath and Swelling    Rosuvastatin Other (See Comments)       Past Medical History:   Diagnosis Date    Chronic pain     Coronary artery disease     Diabetes mellitus     GERD (gastroesophageal reflux disease)     Hypercholesterolemia     Hypertension     NSTEMI (non-ST elevated myocardial infarction) 07/03/2024     Past Surgical History:   Procedure Laterality Date    APPENDECTOMY      CORONARY ANGIOPLASTY WITH STENT PLACEMENT      X4    LEFT HEART CATHETERIZATION N/A 6/30/2024    Procedure: Left heart cath;  Surgeon: Macario Mishra Jr., MD;   Location: [facility] CATH LAB;  Service: Cardiology;  Laterality: N/A;     Family History   Problem Relation Name Age of Onset    Hypertension Mother      Stroke Mother      Diabetes Mother      Hypertension Father      Heart disease Father      Diabetes Father       Social History     Tobacco Use    Smoking status: Every Day     Types: Cigarettes    Smokeless tobacco: Never   Substance Use Topics    Alcohol use: Not Currently    Drug use: Never       Review of Systems:  *ROS*    OBJECTIVE:     Vital Signs:  Temp:  [97.4 °F (36.3 °C)]   Pulse:  [76-93]   Resp:  [8-28]   BP: (113-172)/(51-92)   SpO2:  [91 %-100 %]     Physical Exam:  Alert & oriented x 3  EOMI, PERRLA, visual field intact in all 4 quadrants  Mild left facial droop, speech slightly slurred but unclear if due to absent dentures.   Tongue midline, facial sensation equal bilaterally  RUE, RLE 5/5  LUE 4-/5 able to bend at elbow and lift antigravity but significantly weaker than right side.   LLE able to lift antigravity   Sensation intact      Laboratory:  I have reviewed all pertinent lab results within the past 24 hours.    Diagnostic Results:  CTH no large infarct noted. CTA right ICA occlusion with diminished flow in right MCA territory.     ASSESSMENT/PLAN:     64 M presented with syncope, chest pain, diaphoresis with recent history of NSTEMI in June s/p coronary stenting and angioplasty. Was unable to take antiplatelets due to lack of insurance coverage. While in ED, also noted to have left sided weakness which patient said is chronic due to nerve pain, but wife reported is new. CTA showed total right ICA occlusion with diminished flow in right MCA territory.     Following coronary revascularization, he was brought for cerebral intervention. Due to concern for contrast extravasation from the right groin, left femoral access was obtained and cerebral angiogram was performed. Attempt was made to cross the occluded right ICA but was unsuccessful likely due  to chronic right ICA occlusion. He has collateral flow in right hemisphere from right ECA anastomoses and right PCOM. No significant flow across ACOM noted.     I suspect stump emboli from the right ICA occlusion or hypoperfusion stroke in right hemisphere in the setting of STEMI.     Plan:   - - MAP>65. -160  - H&H q4h   - Admit in ICU for q1h neurochecks  - call vascular surgery Dr Lr if significant drop in hemoglobin or concern for hemodynamic instability. (Dr Lr aware)  - ok to start aggrastat gtt due to concern for right coronary artery occlusion  - left femoral sheath secured to skin in case needed for urgent surgical intervention  - remove left femoral sheath tomorrow 2 hrs after aggrastat infusion is stopped  - CTH in AM    Will follow in AM.     Updated wife about the critical condition of the patient.     Gabriel Dawson MD  Vascular and Interventional Neurology

## 2024-07-18 NOTE — TRANSFER OF CARE
"Anesthesia Transfer of Care Note    Patient: Jb Abbott    Procedure(s) Performed: * No procedures listed *    Patient location: ICU    Anesthesia Type: general    Transport from OR: Continuous ECG monitoring in transport. Continuous SpO2 monitoring in transport. Continuos invasive BP monitoring in transport. Transported from OR on 2-3 L/min O2 by NC with adequate spontaneous ventilation    Post pain: adequate analgesia    Post assessment: no apparent anesthetic complications    Post vital signs: stable    Level of consciousness: awake and responds to stimulation    Nausea/Vomiting: no vomiting    Complications: none    Transfer of care protocol was followed      Last vitals: Visit Vitals  BP (!) 168/92   Pulse 81   Temp 36.3 °C (97.4 °F) (Oral)   Resp 16   Ht 5' 7" (1.702 m)   Wt 80.7 kg (178 lb)   SpO2 98%   BMI 27.88 kg/m²     "

## 2024-07-18 NOTE — TRANSFER OF CARE
"Anesthesia Transfer of Care Note    Patient: Jb Abbott    Procedure(s) Performed: * No procedures listed *    Patient location: PACU    Anesthesia Type: general    Transport from OR: Transported from OR on room air with adequate spontaneous ventilation    Post pain: adequate analgesia    Post assessment: no apparent anesthetic complications    Post vital signs: stable    Level of consciousness: awake and responds to stimulation    Nausea/Vomiting: no nausea/vomiting    Complications: none    Transfer of care protocol was followed      Last vitals: Visit Vitals  BP (!) 168/92   Pulse 81   Temp 36.3 °C (97.4 °F) (Oral)   Resp 16   Ht 5' 7" (1.702 m)   Wt 80.7 kg (178 lb)   SpO2 98%   BMI 27.88 kg/m²     "

## 2024-07-18 NOTE — PROGRESS NOTES
"[facility]    Cardiology  Progress Note    Patient Name: Jb Abbott  MRN: [id number]  Admission Date: 7/17/2024  Hospital Length of Stay: 1 days  Code Status: Prior   Attending Physician: Mark Padilla MD   Primary Care Physician: Greer, Primary Doctor  Expected Discharge Date:   Principal Problem:ST elevation myocardial infarction involving right coronary artery    Subjective:     Brief HPI/Hospital Course: Mr. Abbott is a 63 y/o male who is known to [facility], Dr. Cole. The patient has a prior history of RCA stent with recent NSTEMI underwent coronary angiogram 06/30/2024 and found to have 100% occluded RCA.  Subsequently angioplasty and treated with 2 overlapping drug-eluting stent.  Patient left AMA.  Tells me he lives in [place].  Could not afford any medications.  Did not take any aspirin or Brilinta following discharge from the hospital. He was admitted with chest pain and diaphoresis. An initial EKG reveals an inferior wall STEMI.     7.18.24: NAD. S/P C with PCI to RCA. S/P Cerebral Angiogram. "I am ok." PT reports, "I left this place. I didn't get no medicine."     PMH: DM II, NSTEMI, HTN, Tobacco Use, CAD/Stents (RCA), GERD, HLD  PSH: Angiogram, Appendectomy  Family History: Father, L, HTN, CAD, DM II; Mother, L, HTN, CVA, DM II  Social History: Denies Illicit Drug and ETOH Use; + Tobacco Use Daily 1 ppd     Previous Diagnostics:  Cerebral Angiogram 7.17.24:  OVERALL IMPRESSION:  Concern for right femoral artery dissection resulting in contrast extravasation.  Hemostasis obtained with manual compression with no evidence of contrast extravasation from the contralateral injection.  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  Unsuccessful attempt at access in the right internal carotid artery.    Collateral flow in the right hemisphere via right external carotid artery anastomosis and right posterior communicating artery.  No significant " flow noted across the anterior communicating artery segment.  Concern for Nonocclusive emboli in the right anterior cerebral artery.    RLEA US R/O Pseudo 7.17.24:  The right  common femoral through superficial femoral arteries were patent with no evidence of a pseudoaneurysm.     C 7.17.24:  100% thrombotic occlusion of the RCA secondary to in stent thrombosis due to medical noncompliance.  RCA has multiple layers of stents.  Status post successful mechanical thrombectomy and balloon angioplasty of the proximal to mid RCA with restoration of KAREN 3 flow in the native RCA, superior branch of the PLB and PDA.  Inferior branch of the PLB appears to be occluded with distal embolization of thrombus s/p gentle balloon inflation.  50% mid left circumflex coronary artery stenosis  No obstructive disease in the LAD  Mildly elevated LVEDP  Coronary Findings:  Dominance: right   Left main:  No obstructive disease with less than 10% epicardial stenosis  Left anterior descending artery:  Diffuse 20% disease in the mid LAD.  Diagonal branch is free of any obstructive disease  Circumflex artery:  Focal 50% stenosis in the mid left circumflex coronary artery.  Disease in the OM branches.  Right coronary artery:  100% thrombotic occlusion of the ostial RCA with KAREN 0 flow. Previous stents noted from the proximal to mid RCA.  Impression  100% thrombotic occlusion of the RCA secondary to in stent thrombosis due to medical noncompliance.  RCA has multiple layers of stents.  Status post successful mechanical thrombectomy and balloon angioplasty of the proximal to mid RCA with restoration of KAREN 3 flow in the native RCA, superior branch of the PLB and PDA.  Inferior branch of the PLB appears to be occluded with distal embolization of thrombus s/p gentle balloon inflation.   50% mid left circumflex coronary artery stenosis  No obstructive disease in the LAD  Mildly elevated LVEDP  Plan  Plavix 75 mg p.o. daily (rationale for the use  given above)  Aspirin 81 mg p.o. daily  For cerebral angiography per neurointerventionalist  IV fluid hydration  Aggrastat drip for 18 hours  Monitor right groin    ECHO 7.4.24:  Left Ventricle: There is normal systolic function.     ECHO 6.30.24:  Left Ventricle: The left ventricle is normal in size. Normal wall thickness. Normal wall motion. The basal inferior wall has brighter scaring and slightly aneurysmal, appears consistent with old MI. There is normal systolic function with a visually estimated ejection fraction of 55 - 60%. Grade I diastolic dysfunction.  Right Ventricle: Normal right ventricular cavity size. Systolic function is normal.  Aortic Valve: The aortic valve is a trileaflet valve.  Mitral Valve: The mitral valve is structurally normal. There is mild regurgitation.    [facility] 6.30.24:  Findings:  - There is severe coronary artery disease.  - Mid Left Circumflex has a 50% stenosis.  - Prox Right Coronary Artery had a 50-60% stenosis. Mid Right Coronary Artery was 100% occluded. The mid RCA lesion was thrombotic, and the culprit lesion. There were faint left-to-right collaterals from the septal perforating branches of the LAD. This was the culprit lesion. The lesions were successfully treated with overlapping SYNERGY XD 4.0x38MM and 3.5x32MM drug-eluting stents. Post-dilatation was performed using an NC EUPHORA 4.0x27MM angioplasty balloon inflated to nominal pressure. Following intervention there was 0% residual stenosis and KAREN grade 3 flow in the distal vessel.  - Intravascular Ultrasound (IVUS) was performed prior to intervention to further characterize the lesion and measure the vessel for PCI.  - LVEDP is 25 mmHg.  Assessment/Plan:  - Patient is a 64 y.o. male with a history of CAD (prior DAVID to RCA), DM2, HTN, now presents with type I NSTEMI. Found to have culprit lesion in RCA which was successfully treated as noted above.  - Patient was given a loading dose of ticagrelor 180 mg PO in the cath  lab  - Continue DAPT (aspirin + ticagrelor) for a minimum of 12 months and aspirin indefinitely thereafter  - Intolerant of statins. Patient needs to be on PCSK9 inhibitor in outpatient setting.  - Continue Aggrastat for 6 hours post-procedure  - Radial band weaning per protocol    Review of Systems   Constitutional: Positive for malaise/fatigue.   Cardiovascular:  Negative for chest pain, claudication and leg swelling.   Respiratory:  Negative for shortness of breath.    Neurological:  Positive for weakness.   All other systems reviewed and are negative.    Objective:     Vital Signs (Most Recent):  Temp: 98.7 °F (37.1 °C) (07/18/24 0400)  Pulse: 91 (07/18/24 0800)  Resp: 15 (07/18/24 0800)  BP: 105/64 (07/18/24 0800)  SpO2: 97 % (07/18/24 0800) Vital Signs (24h Range):  Temp:  [97.4 °F (36.3 °C)-98.7 °F (37.1 °C)] 98.7 °F (37.1 °C)  Pulse:  [76-93] 91  Resp:  [8-28] 15  SpO2:  [91 %-100 %] 97 %  BP: (102-179)/() 105/64  Arterial Line BP: (130-210)/(53-88) 141/56   Weight: 80.7 kg (178 lb)  Body mass index is 27.88 kg/m².  SpO2: 97 %       Intake/Output Summary (Last 24 hours) at 7/18/2024 0814  Last data filed at 7/18/2024 0800  Gross per 24 hour   Intake 600 ml   Output 1575 ml   Net -975 ml     Lines/Drains/Airways       Drain  Duration                  Urethral Catheter 07/17/24 2340 <1 day              Arterial Line  Duration             Arterial Line 07/17/24 2202 Left Radial <1 day              Peripheral Intravenous Line  Duration                  Peripheral IV - Single Lumen 18 G Anterior;Left;Proximal Forearm -- days         Peripheral IV - Single Lumen 07/17/24 2005 18 G Anterior;Proximal;Right Forearm <1 day         Sheath 07/17/24 2157 Left anterior;proximal <1 day                  Significant Labs:   Chemistries:   Recent Labs   Lab 07/17/24 2023 07/18/24  0506    136   K 4.4 4.6    104   CO2 23 21*   BUN 25.6 24.5   CREATININE 1.28* 1.10   CALCIUM 9.6 8.4*   BILITOT 0.4 0.3    ALKPHOS 93 86   ALT 33 35   AST 15 52*   GLUCOSE 322* 347*   MG  --  2.00   PHOS  --  4.4   TROPONINI <0.010  --         CBC/Anemia Labs: Coags:    Recent Labs   Lab 07/17/24 2023 07/18/24 0023 07/18/24  0507   WBC 15.68*  --  10.73   HGB 13.8* 13.4* 12.1*   HCT 42.2 41.0* 37.2*     --  245   MCV 86.1  --  85.7   RDW 15.4  --  15.7    Recent Labs   Lab 07/17/24 2023   INR 1.0   APTT 24.4        Telemetry: SR    Physical Exam  Constitutional:       General: He is not in acute distress.     Appearance: Normal appearance. He is obese.   HENT:      Head: Normocephalic.      Mouth/Throat:      Mouth: Mucous membranes are moist.   Eyes:      Extraocular Movements: Extraocular movements intact.      Conjunctiva/sclera: Conjunctivae normal.   Cardiovascular:      Rate and Rhythm: Normal rate and regular rhythm.      Pulses: Normal pulses.      Heart sounds: Murmur heard.   Pulmonary:      Effort: Respiratory distress present.      Breath sounds: Normal breath sounds.      Comments: NC O2  Abdominal:      Palpations: Abdomen is soft.   Skin:     General: Skin is warm and dry.   Neurological:      Mental Status: He is alert and oriented to person, place, and time.      Comments: L Sided Facial Droop, LUE Weaker than RUE; R Groin Sheath Intact, Soft/Flat, Non-Tender, No Sign of Bleed/Infection. +2 BLE Palpable Pedal Pulses    Psychiatric:         Mood and Affect: Mood normal.         Behavior: Behavior normal.       Current Schedule Inpatient Medications:   aspirin  81 mg Oral Daily    clopidogreL  75 mg Oral Daily    mupirocin   Nasal BID    pantoprazole  40 mg Intravenous Daily     Continuous Infusions:   0.9% NaCl   Intravenous Continuous 100 mL/hr at 07/18/24 0014 New Bag at 07/18/24 0014    tirofiban-0.9% sodium chloride 12.5 mg/250ml  0.15 mcg/kg/min Intravenous Continuous 14.5 mL/hr at 07/18/24 0023 0.15 mcg/kg/min at 07/18/24 0023     Assessment:   STEMI (Non-Anterior Wall MI)    - [facility] (7.17.24) - 100%  thrombotic occlusion of the RCA secondary to in stent thrombosis due to medical noncompliance. RCA has multiple layers of stents. Status post successful mechanical thrombectomy and balloon angioplasty of the proximal to mid RCA with restoration of KAREN 3 flow in the native RCA, superior branch of the PLB and PDA. Inferior branch of the PLB appears to be occluded with distal embolization of thrombus s/p gentle balloon inflation. 50% mid left circumflex coronary artery stenosis. No obstructive disease in the LAD.    - Inferior Wall MI (Initial EKG)  CAD/Stents  R ICA Occlusion s/p Cerebral Angiogram with Possible Dissection  R CFA Sheath Extravasation with Vascular Surgery Backup - Resolved  HTN  HLD  DM II  Tobacco Use  GERD  Medical Therapy Non-Compliance  No Hx of GIB     Plan:   Continue ASA and Plavix  Add GDMT as BP/HR Allows  ECHO Pending  Sheath per Neurology   Groin Precautions  Labs and EKG in AM: CBC, CMP and Mg    Carlitos Stewart, ANP  Cardiology  [facility]

## 2024-07-19 LAB
ALBUMIN SERPL-MCNC: 3.1 G/DL (ref 3.4–4.8)
ALBUMIN/GLOB SERPL: 1.1 RATIO (ref 1.1–2)
ALP SERPL-CCNC: 81 UNIT/L (ref 40–150)
ALT SERPL-CCNC: 31 UNIT/L (ref 0–55)
ANION GAP SERPL CALC-SCNC: 16 MMOL/L (ref 8–16)
ANION GAP SERPL CALC-SCNC: 7 MEQ/L
APICAL FOUR CHAMBER EJECTION FRACTION: 48 %
APICAL TWO CHAMBER EJECTION FRACTION: 41 %
AST SERPL-CCNC: 44 UNIT/L (ref 5–34)
AV INDEX (PROSTH): 0.69
AV MEAN GRADIENT: 6 MMHG
AV PEAK GRADIENT: 11 MMHG
AV VELOCITY RATIO: 0.69
BASOPHILS # BLD AUTO: 0.04 X10(3)/MCL
BASOPHILS NFR BLD AUTO: 0.6 %
BILIRUB SERPL-MCNC: 0.6 MG/DL
BSA FOR ECHO PROCEDURE: 1.95 M2
BUN SERPL-MCNC: 19 MG/DL (ref 8.4–25.7)
BUN SERPL-MCNC: 26 MG/DL (ref 6–30)
CALCIUM SERPL-MCNC: 8.3 MG/DL (ref 8.8–10)
CHLORIDE SERPL-SCNC: 101 MMOL/L (ref 95–110)
CHLORIDE SERPL-SCNC: 107 MMOL/L (ref 98–107)
CO2 SERPL-SCNC: 23 MMOL/L (ref 23–31)
CREAT SERPL-MCNC: 0.87 MG/DL (ref 0.73–1.18)
CREAT SERPL-MCNC: 1.1 MG/DL (ref 0.5–1.4)
CREAT/UREA NIT SERPL: 22
CV ECHO LV RWT: 0.53 CM
DOP CALC AO PEAK VEL: 1.67 M/S
DOP CALC AO VTI: 26.8 CM
DOP CALC LVOT PEAK VEL: 1.16 M/S
DOP CALCLVOT PEAK VEL VTI: 18.4 CM
E WAVE DECELERATION TIME: 180 MSEC
E/A RATIO: 0.71
E/E' RATIO: 12.71 M/S
ECHO LV POSTERIOR WALL: 1.2 CM (ref 0.6–1.1)
EJECTION FRACTION: 55 %
EOSINOPHIL # BLD AUTO: 0.02 X10(3)/MCL (ref 0–0.9)
EOSINOPHIL NFR BLD AUTO: 0.3 %
ERYTHROCYTE [DISTWIDTH] IN BLOOD BY AUTOMATED COUNT: 15.9 % (ref 11.5–17)
FRACTIONAL SHORTENING: 24 % (ref 28–44)
GFR SERPLBLD CREATININE-BSD FMLA CKD-EPI: >60 ML/MIN/1.73/M2
GLOBULIN SER-MCNC: 2.7 GM/DL (ref 2.4–3.5)
GLUCOSE SERPL-MCNC: 187 MG/DL (ref 82–115)
GLUCOSE SERPL-MCNC: 325 MG/DL (ref 70–110)
HCT VFR BLD AUTO: 32.9 % (ref 42–52)
HCT VFR BLD AUTO: 33.3 % (ref 42–52)
HCT VFR BLD CALC: 43 %PCV (ref 36–54)
HGB BLD-MCNC: 10.4 G/DL (ref 14–18)
HGB BLD-MCNC: 10.8 G/DL (ref 14–18)
HGB BLD-MCNC: 15 G/DL
IMM GRANULOCYTES # BLD AUTO: 0.01 X10(3)/MCL (ref 0–0.04)
IMM GRANULOCYTES NFR BLD AUTO: 0.1 %
INTERVENTRICULAR SEPTUM: 1.1 CM (ref 0.6–1.1)
LEFT ATRIUM AREA SYSTOLIC (APICAL 2 CHAMBER): 17.4 CM2
LEFT ATRIUM AREA SYSTOLIC (APICAL 4 CHAMBER): 17.7 CM2
LEFT ATRIUM SIZE: 3.2 CM
LEFT ATRIUM VOLUME INDEX MOD: 26.2 ML/M2
LEFT ATRIUM VOLUME MOD: 50.3 CM3
LEFT INTERNAL DIMENSION IN SYSTOLE: 3.4 CM (ref 2.1–4)
LEFT VENTRICLE DIASTOLIC VOLUME INDEX: 48.13 ML/M2
LEFT VENTRICLE DIASTOLIC VOLUME: 92.4 ML
LEFT VENTRICLE END DIASTOLIC VOLUME APICAL 2 CHAMBER: 66.7 ML
LEFT VENTRICLE END DIASTOLIC VOLUME APICAL 4 CHAMBER: 89.2 ML
LEFT VENTRICLE END SYSTOLIC VOLUME APICAL 2 CHAMBER: 50.1 ML
LEFT VENTRICLE END SYSTOLIC VOLUME APICAL 4 CHAMBER: 44.9 ML
LEFT VENTRICLE MASS INDEX: 97 G/M2
LEFT VENTRICLE SYSTOLIC VOLUME INDEX: 24.7 ML/M2
LEFT VENTRICLE SYSTOLIC VOLUME: 47.4 ML
LEFT VENTRICULAR INTERNAL DIMENSION IN DIASTOLE: 4.5 CM (ref 3.5–6)
LEFT VENTRICULAR MASS: 186.39 G
LV LATERAL E/E' RATIO: 11.13 M/S
LV SEPTAL E/E' RATIO: 14.83 M/S
LVED V (TEICH): 92.4 ML
LVES V (TEICH): 47.4 ML
LVOT MG: 3 MMHG
LVOT MV: 0.73 CM/S
LYMPHOCYTES # BLD AUTO: 1.07 X10(3)/MCL (ref 0.6–4.6)
LYMPHOCYTES NFR BLD AUTO: 14.8 %
MAGNESIUM SERPL-MCNC: 2.1 MG/DL (ref 1.6–2.6)
MCH RBC QN AUTO: 28.1 PG (ref 27–31)
MCHC RBC AUTO-ENTMCNC: 32.4 G/DL (ref 33–36)
MCV RBC AUTO: 86.7 FL (ref 80–94)
MONOCYTES # BLD AUTO: 0.77 X10(3)/MCL (ref 0.1–1.3)
MONOCYTES NFR BLD AUTO: 10.6 %
MV PEAK A VEL: 1.25 M/S
MV PEAK E VEL: 0.89 M/S
NEUTROPHILS # BLD AUTO: 5.33 X10(3)/MCL (ref 2.1–9.2)
NEUTROPHILS NFR BLD AUTO: 73.6 %
NRBC BLD AUTO-RTO: 0 %
OHS LV EJECTION FRACTION SIMPSONS BIPLANE MOD: 45 %
OHS QRS DURATION: 92 MS
OHS QTC CALCULATION: 476 MS
PHOSPHATE SERPL-MCNC: 2.2 MG/DL (ref 2.3–4.7)
PLATELET # BLD AUTO: 211 X10(3)/MCL (ref 130–400)
PMV BLD AUTO: 10.2 FL (ref 7.4–10.4)
POC IONIZED CALCIUM: 1.22 MMOL/L (ref 1.06–1.42)
POC TCO2 (MEASURED): 25 MMOL/L (ref 23–29)
POTASSIUM BLD-SCNC: 4 MMOL/L (ref 3.5–5.1)
POTASSIUM SERPL-SCNC: 3.9 MMOL/L (ref 3.5–5.1)
PROT SERPL-MCNC: 5.8 GM/DL (ref 5.8–7.6)
PV PEAK GRADIENT: 6 MMHG
PV PEAK VELOCITY: 1.24 M/S
RA PRESSURE ESTIMATED: 3 MMHG
RBC # BLD AUTO: 3.84 X10(6)/MCL (ref 4.7–6.1)
SAMPLE: ABNORMAL
SODIUM BLD-SCNC: 137 MMOL/L (ref 136–145)
SODIUM SERPL-SCNC: 137 MMOL/L (ref 136–145)
TDI LATERAL: 0.08 M/S
TDI SEPTAL: 0.06 M/S
TDI: 0.07 M/S
TRICUSPID ANNULAR PLANE SYSTOLIC EXCURSION: 1.88 CM
WBC # BLD AUTO: 7.24 X10(3)/MCL (ref 4.5–11.5)
Z-SCORE OF LEFT VENTRICULAR DIMENSION IN END DIASTOLE: -1.86
Z-SCORE OF LEFT VENTRICULAR DIMENSION IN END SYSTOLE: 0.15

## 2024-07-19 PROCEDURE — 85014 HEMATOCRIT: CPT | Performed by: INTERNAL MEDICINE

## 2024-07-19 PROCEDURE — 80053 COMPREHEN METABOLIC PANEL: CPT

## 2024-07-19 PROCEDURE — 85025 COMPLETE CBC W/AUTO DIFF WBC: CPT

## 2024-07-19 PROCEDURE — 93010 ELECTROCARDIOGRAM REPORT: CPT | Mod: ,,, | Performed by: INTERNAL MEDICINE

## 2024-07-19 PROCEDURE — 83735 ASSAY OF MAGNESIUM: CPT

## 2024-07-19 PROCEDURE — 36415 COLL VENOUS BLD VENIPUNCTURE: CPT

## 2024-07-19 PROCEDURE — 25000003 PHARM REV CODE 250: Performed by: NURSE PRACTITIONER

## 2024-07-19 PROCEDURE — 85018 HEMOGLOBIN: CPT | Performed by: INTERNAL MEDICINE

## 2024-07-19 PROCEDURE — 25000003 PHARM REV CODE 250: Performed by: INTERNAL MEDICINE

## 2024-07-19 PROCEDURE — 84100 ASSAY OF PHOSPHORUS: CPT

## 2024-07-19 PROCEDURE — 63600175 PHARM REV CODE 636 W HCPCS

## 2024-07-19 PROCEDURE — 93005 ELECTROCARDIOGRAM TRACING: CPT

## 2024-07-19 PROCEDURE — 92523 SPEECH SOUND LANG COMPREHEN: CPT

## 2024-07-19 PROCEDURE — 20000000 HC ICU ROOM

## 2024-07-19 PROCEDURE — 97166 OT EVAL MOD COMPLEX 45 MIN: CPT

## 2024-07-19 PROCEDURE — 36415 COLL VENOUS BLD VENIPUNCTURE: CPT | Performed by: INTERNAL MEDICINE

## 2024-07-19 PROCEDURE — 97162 PT EVAL MOD COMPLEX 30 MIN: CPT

## 2024-07-19 RX ORDER — TAMSULOSIN HYDROCHLORIDE 0.4 MG/1
0.4 CAPSULE ORAL DAILY
Status: DISCONTINUED | OUTPATIENT
Start: 2024-07-19 | End: 2024-07-20 | Stop reason: HOSPADM

## 2024-07-19 RX ORDER — CARVEDILOL 3.12 MG/1
6.25 TABLET ORAL 2 TIMES DAILY
Status: DISCONTINUED | OUTPATIENT
Start: 2024-07-19 | End: 2024-07-20 | Stop reason: HOSPADM

## 2024-07-19 RX ADMIN — HYDROCODONE BITARTRATE AND ACETAMINOPHEN 1 TABLET: 5; 325 TABLET ORAL at 08:07

## 2024-07-19 RX ADMIN — TAMSULOSIN HYDROCHLORIDE 0.4 MG: 0.4 CAPSULE ORAL at 12:07

## 2024-07-19 RX ADMIN — HYDROCODONE BITARTRATE AND ACETAMINOPHEN 1 TABLET: 5; 325 TABLET ORAL at 12:07

## 2024-07-19 RX ADMIN — HYDROCODONE BITARTRATE AND ACETAMINOPHEN 1 TABLET: 5; 325 TABLET ORAL at 02:07

## 2024-07-19 RX ADMIN — PANTOPRAZOLE SODIUM 40 MG: 40 INJECTION, POWDER, LYOPHILIZED, FOR SOLUTION INTRAVENOUS at 08:07

## 2024-07-19 RX ADMIN — CARVEDILOL 6.25 MG: 3.12 TABLET, FILM COATED ORAL at 02:07

## 2024-07-19 RX ADMIN — MUPIROCIN: 20 OINTMENT TOPICAL at 08:07

## 2024-07-19 RX ADMIN — CLOPIDOGREL BISULFATE 75 MG: 75 TABLET ORAL at 08:07

## 2024-07-19 RX ADMIN — LABETALOL HYDROCHLORIDE 10 MG: 5 INJECTION, SOLUTION INTRAVENOUS at 07:07

## 2024-07-19 RX ADMIN — CARVEDILOL 6.25 MG: 3.12 TABLET, FILM COATED ORAL at 08:07

## 2024-07-19 RX ADMIN — ASPIRIN 81 MG: 81 TABLET, COATED ORAL at 08:07

## 2024-07-19 RX ADMIN — HYDRALAZINE HYDROCHLORIDE 10 MG: 20 INJECTION INTRAMUSCULAR; INTRAVENOUS at 02:07

## 2024-07-19 NOTE — PROGRESS NOTES
"[facility]    Cardiology  Progress Note    Patient Name: Jb Abbott  MRN: [id number]  Admission Date: 7/17/2024  Hospital Length of Stay: 2 days  Code Status: Full Code   Attending Physician: AXEL Matute MD   Primary Care Physician: Greer, Primary Doctor  Expected Discharge Date:   Principal Problem:ST elevation myocardial infarction involving right coronary artery    Subjective:     Brief HPI/Hospital Course: Mr. Abbott is a 63 y/o male who is known to [facility], Dr. Cole. The patient has a prior history of RCA stent with recent NSTEMI underwent coronary angiogram 06/30/2024 and found to have 100% occluded RCA.  Subsequently angioplasty and treated with 2 overlapping drug-eluting stent.  Patient left AMA.  Tells me he lives in [place].  Could not afford any medications.  Did not take any aspirin or Brilinta following discharge from the hospital. He was admitted with chest pain and diaphoresis. An initial EKG reveals an inferior wall STEMI.     7.18.24: NAD. S/P LHC with PCI to RCA. S/P Cerebral Angiogram. "I am ok." PT reports, "I left this place. I didn't get no medicine."   7.19.24: NAD. Sitting in Bedside Chair. Denies CP, SOB and Palps. Confused Conversation, Phone upside down looking at it." VSS.     PMH: DM II, NSTEMI, HTN, Tobacco Use, CAD/Stents (RCA), GERD, HLD  PSH: Angiogram, Appendectomy  Family History: Father, L, HTN, CAD, DM II; Mother, L, HTN, CVA, DM II  Social History: Denies Illicit Drug and ETOH Use; + Tobacco Use Daily 1 ppd     Previous Diagnostics:  ECHO 7.18.24:  Left Ventricle: The left ventricle is normal in size. Mildly increased wall thickness. There is normal systolic function. Ejection fraction by visual approximation is 55%. Grade I diastolic dysfunction.  Right Ventricle: Normal right ventricular cavity size. Systolic function is normal. TAPSE is 1.88 cm.  Aortic Valve: There is mild aortic valve sclerosis. There is mild aortic regurgitation.  IVC/SVC: Normal " venous pressure at 3 mmHg.    Cerebral Angiogram 7.17.24:  OVERALL IMPRESSION:  Concern for right femoral artery dissection resulting in contrast extravasation.  Hemostasis obtained with manual compression with no evidence of contrast extravasation from the contralateral injection.  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  Unsuccessful attempt at access in the right internal carotid artery.    Collateral flow in the right hemisphere via right external carotid artery anastomosis and right posterior communicating artery.  No significant flow noted across the anterior communicating artery segment.  Concern for Nonocclusive emboli in the right anterior cerebral artery.    RLEA US R/O Pseudo 7.17.24:  The right  common femoral through superficial femoral arteries were patent with no evidence of a pseudoaneurysm.     C 7.17.24:  100% thrombotic occlusion of the RCA secondary to in stent thrombosis due to medical noncompliance.  RCA has multiple layers of stents.  Status post successful mechanical thrombectomy and balloon angioplasty of the proximal to mid RCA with restoration of KAREN 3 flow in the native RCA, superior branch of the PLB and PDA.  Inferior branch of the PLB appears to be occluded with distal embolization of thrombus s/p gentle balloon inflation.  50% mid left circumflex coronary artery stenosis  No obstructive disease in the LAD  Mildly elevated LVEDP  Coronary Findings:  Dominance: right   Left main:  No obstructive disease with less than 10% epicardial stenosis  Left anterior descending artery:  Diffuse 20% disease in the mid LAD.  Diagonal branch is free of any obstructive disease  Circumflex artery:  Focal 50% stenosis in the mid left circumflex coronary artery.  Disease in the OM branches.  Right coronary artery:  100% thrombotic occlusion of the ostial RCA with KAREN 0 flow. Previous stents noted from the proximal to mid RCA.  Impression  100%  thrombotic occlusion of the RCA secondary to in stent thrombosis due to medical noncompliance.  RCA has multiple layers of stents.  Status post successful mechanical thrombectomy and balloon angioplasty of the proximal to mid RCA with restoration of KAREN 3 flow in the native RCA, superior branch of the PLB and PDA.  Inferior branch of the PLB appears to be occluded with distal embolization of thrombus s/p gentle balloon inflation.   50% mid left circumflex coronary artery stenosis  No obstructive disease in the LAD  Mildly elevated LVEDP  Plan  Plavix 75 mg p.o. daily (rationale for the use given above)  Aspirin 81 mg p.o. daily  For cerebral angiography per neurointerventionalist  IV fluid hydration  Aggrastat drip for 18 hours  Monitor right groin    ECHO 7.4.24:  Left Ventricle: There is normal systolic function.     ECHO 6.30.24:  Left Ventricle: The left ventricle is normal in size. Normal wall thickness. Normal wall motion. The basal inferior wall has brighter scaring and slightly aneurysmal, appears consistent with old MI. There is normal systolic function with a visually estimated ejection fraction of 55 - 60%. Grade I diastolic dysfunction.  Right Ventricle: Normal right ventricular cavity size. Systolic function is normal.  Aortic Valve: The aortic valve is a trileaflet valve.  Mitral Valve: The mitral valve is structurally normal. There is mild regurgitation.    [facility] 6.30.24:  Findings:  - There is severe coronary artery disease.  - Mid Left Circumflex has a 50% stenosis.  - Prox Right Coronary Artery had a 50-60% stenosis. Mid Right Coronary Artery was 100% occluded. The mid RCA lesion was thrombotic, and the culprit lesion. There were faint left-to-right collaterals from the septal perforating branches of the LAD. This was the culprit lesion. The lesions were successfully treated with overlapping SYNERGY XD 4.0x38MM and 3.5x32MM drug-eluting stents. Post-dilatation was performed using an NC EUPHORA  4.0x27MM angioplasty balloon inflated to nominal pressure. Following intervention there was 0% residual stenosis and KAREN grade 3 flow in the distal vessel.  - Intravascular Ultrasound (IVUS) was performed prior to intervention to further characterize the lesion and measure the vessel for PCI.  - LVEDP is 25 mmHg.  Assessment/Plan:  - Patient is a 64 y.o. male with a history of CAD (prior DAVID to RCA), DM2, HTN, now presents with type I NSTEMI. Found to have culprit lesion in RCA which was successfully treated as noted above.  - Patient was given a loading dose of ticagrelor 180 mg PO in the cath lab  - Continue DAPT (aspirin + ticagrelor) for a minimum of 12 months and aspirin indefinitely thereafter  - Intolerant of statins. Patient needs to be on PCSK9 inhibitor in outpatient setting.  - Continue Aggrastat for 6 hours post-procedure  - Radial band weaning per protocol    Review of Systems   Constitutional: Positive for malaise/fatigue.   Cardiovascular:  Negative for chest pain, claudication and leg swelling.   Respiratory:  Negative for shortness of breath.    Neurological:  Positive for weakness.   All other systems reviewed and are negative.    Objective:     Vital Signs (Most Recent):  Temp: 98.4 °F (36.9 °C) (07/19/24 0845)  Pulse: 101 (07/19/24 1300)  Resp: 14 (07/19/24 1257)  BP: (!) 155/83 (07/19/24 1300)  SpO2: 99 % (07/19/24 1300) Vital Signs (24h Range):  Temp:  [98.4 °F (36.9 °C)-98.6 °F (37 °C)] 98.4 °F (36.9 °C)  Pulse:  [] 101  Resp:  [13-72] 14  SpO2:  [94 %-99 %] 99 %  BP: ()/(57-86) 155/83  Arterial Line BP: (120-276)/() 166/53   Weight: 80.7 kg (178 lb)  Body mass index is 27.88 kg/m².  SpO2: 99 %       Intake/Output Summary (Last 24 hours) at 7/19/2024 1347  Last data filed at 7/19/2024 1203  Gross per 24 hour   Intake 1412.36 ml   Output 1650 ml   Net -237.64 ml     Lines/Drains/Airways       Peripheral Intravenous Line  Duration                  Peripheral IV - Single Lumen  18 G Anterior;Left;Proximal Forearm -- days         Peripheral IV - Single Lumen 07/17/24 2005 18 G Anterior;Proximal;Right Forearm 1 day                  Significant Labs:   Chemistries:   Recent Labs   Lab 07/17/24 2023 07/18/24  0506 07/19/24  0158    136 137   K 4.4 4.6 3.9    104 107   CO2 23 21* 23   BUN 25.6 24.5 19.0   CREATININE 1.28* 1.10 0.87   CALCIUM 9.6 8.4* 8.3*   BILITOT 0.4 0.3 0.6   ALKPHOS 93 86 81   ALT 33 35 31   AST 15 52* 44*   GLUCOSE 322* 347* 187*   MG  --  2.00 2.10   PHOS  --  4.4 2.2*   TROPONINI <0.010  --   --         CBC/Anemia Labs: Coags:    Recent Labs   Lab 07/17/24 2023 07/18/24  0023 07/18/24  0507 07/18/24  0824 07/18/24 2117 07/19/24  0158 07/19/24  0803   WBC 15.68*  --  10.73  --   --  7.24  --    HGB 13.8*   < > 12.1*   < > 11.2* 10.8* 10.4*   HCT 42.2   < > 37.2*   < > 34.2* 33.3* 32.9*     --  245  --   --  211  --    MCV 86.1  --  85.7  --   --  86.7  --    RDW 15.4  --  15.7  --   --  15.9  --     < > = values in this interval not displayed.    Recent Labs   Lab 07/17/24 2023   INR 1.0   APTT 24.4        Telemetry: SR    Physical Exam  Constitutional:       General: He is not in acute distress.     Appearance: Normal appearance. He is obese.   HENT:      Head: Normocephalic.      Mouth/Throat:      Mouth: Mucous membranes are moist.   Eyes:      Conjunctiva/sclera: Conjunctivae normal.   Cardiovascular:      Rate and Rhythm: Normal rate and regular rhythm.      Pulses: Normal pulses.      Heart sounds: Murmur heard.   Pulmonary:      Effort: Respiratory distress present.      Breath sounds: Normal breath sounds.      Comments: NC O2  Abdominal:      Palpations: Abdomen is soft.   Skin:     General: Skin is warm and dry.   Neurological:      Mental Status: He is alert and oriented to person, place, and time.      Comments: L Sided Facial Droop Improving, LUE Weaker than RUE; R Groin Soft/Flat, Noted Ecchymosis, Mild Tenderness, No Sign of  Bleed/Infection. +2 BLE Palpable Pedal Pulses    Psychiatric:         Mood and Affect: Mood normal.         Behavior: Behavior normal.       Current Schedule Inpatient Medications:   aspirin  81 mg Oral Daily    clopidogreL  75 mg Oral Daily    mupirocin   Nasal BID    pantoprazole  40 mg Intravenous Daily    tamsulosin  0.4 mg Oral Daily     Continuous Infusions:   0.9% NaCl   Intravenous Continuous 100 mL/hr at 07/19/24 0200 Rate Verify at 07/19/24 0200     Assessment:   STEMI (Non-Anterior Wall MI)    - [facility] (7.17.24) - 100% thrombotic occlusion of the RCA secondary to in stent thrombosis due to medical noncompliance. RCA has multiple layers of stents. Status post successful mechanical thrombectomy and balloon angioplasty of the proximal to mid RCA with restoration of KAREN 3 flow in the native RCA, superior branch of the PLB and PDA. Inferior branch of the PLB appears to be occluded with distal embolization of thrombus s/p gentle balloon inflation. 50% mid left circumflex coronary artery stenosis. No obstructive disease in the LAD.    - Inferior Wall MI (Initial EKG)  CAD/Stents    - ECHO (7.18.24) - LVEF 55%  R ICA Occlusion s/p Cerebral Angiogram with Possible Dissection  R CFA Sheath Extravasation with Vascular Surgery Backup - Resolved    - CT Abd/Pelvis (7.18.24) - Findings seen consistent with hemorrhage extending from the right inguinal region into the right hemipelvis and right lower retroperitoneal region. Urinary bladder wall is thickened and cystitis should be excluded. Small amount of perinephric fluid seen around the left kidney which is a new finding. Hepatic steatosis.  HTN  HLD  DM II  Tobacco Use  GERD  Medical Therapy Non-Compliance  Statin Allergy  No Hx of GIB     Plan:   ECHO Reviewed  Continue ASA and Plavix  No Statin 2/2 Allergy  Start Coreg 6.25mg PO BID   Add GDMT as BP/HR Allows  Labs in AM: CBC, CMP and Mg    Carlitos Stewart, ANP  Cardiology  [facility]

## 2024-07-19 NOTE — PROGRESS NOTES
Vascular surgery progress note:     Patient has right common femoral artery access complications.  Pain is doing much better today.      Patient was sitting on chair with Prolene placed.      Hemoglobin has been stable.      I have discussed with nursing to obtain labs in a.m. to ensure hemoglobin remain stable.     Zachery Lr MD

## 2024-07-19 NOTE — PLAN OF CARE
Problem: SLP  Goal: SLP Goal  Description:   LTG: complete complex cognitive with supervision  STGs:  1.  3-4 item delayed memory tasks with visual cues  2.  Minimally complex executive function tasks with min cues  3.  Minimally complex problem solving tasks with supervision  Outcome: Progressing
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
## 2024-07-19 NOTE — PLAN OF CARE
Problem: Physical Therapy  Goal: Physical Therapy Goal  Description: Goals to be met by: 24     Patient will increase functional independence with mobility by performin. Supine to sit with [place]  2. Sit to supine with [place]  3. Sit to stand transfer with [place]  4. Gait  x 300 feet with [place] using No Assistive Device.   5. Ascend/descend 5 stair with left Handrails Modified [place] using No Assistive Device.     Outcome: Progressing
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
## 2024-07-19 NOTE — PLAN OF CARE
Problem: Occupational Therapy  Goal: Occupational Therapy Goal  Description: Goals to be met by: 8/19/24     Patient will increase functional independence with ADLs by performing:    UE Dressing with Modified [place].  LE Dressing with Modified [place].  Grooming while standing at sink with Modified [place].  Toileting from toilet with Modified [place] for hygiene and clothing management.   Toilet transfer to toilet with Modified [place].  Increased functional strength to 4/5 through therEx, neuromuscular re-ed.  Pt will attend to L side of environment with no cues to reduce fall risk    Outcome: Progressing

## 2024-07-19 NOTE — PROGRESS NOTES
Pulmonary & Critical Care Medicine   Progress Note      Presenting History/HPI:    Patient is a 64 y.o. male presents with chest pain, diaphoresis, syncopal event that started about 1 hr ago. He has a history of recent coronary angiogram where he was found to have right coronary artery occlusion and required angioplasty/stenting. However patient left AMA and did not take his antiplatelets. On arrival to ED, he was also noted to have left sided weakness regarding which he reported that he has left shoulder injury with nerve damage. CTH, CTA showed no acute infarct but showed a right ICA occlusion at the origin. NIHSS was noted to be 9 while in the ED. He was found to have Acute MI based on the initial work up and was taken stat to cardiac cath lab for coronary revascularization.      Post coronary angioplasty, patient was alert, oriented, moving LUE antigravity but weaker than the right side. Able to lift the LLE antigravity. SBP in 160s at that time. After discussing with wife, patient was brought for cerebral angiogram and possible intervention. Per wife, patient did not have any baseline weakness and was able to use his left arm despite some nerve pain in the forearm.      Interval History:       Scheduled Medications:    aspirin  81 mg Oral Daily    clopidogreL  75 mg Oral Daily    mupirocin   Nasal BID    pantoprazole  40 mg Intravenous Daily    tamsulosin  0.4 mg Oral Daily       PRN Medications:     Current Facility-Administered Medications:     acetaminophen, 650 mg, Oral, Q4H PRN    bisacodyL, 10 mg, Rectal, Daily PRN    dextrose 10%, 12.5 g, Intravenous, PRN    dextrose 10%, 25 g, Intravenous, PRN    glucagon (human recombinant), 1 mg, Intramuscular, PRN    hydrALAZINE, 10 mg, Intravenous, Q4H PRN    HYDROcodone-acetaminophen, 1 tablet, Oral, Q4H PRN    insulin aspart U-100, 0-5 Units, Subcutaneous, Q6H PRN    labetalol, 10 mg, Intravenous, Q4H PRN    morphine, 2 mg, Intravenous, Q4H PRN     "ondansetron, 8 mg, Oral, Q8H PRN      Infusions:     0.9% NaCl   Intravenous Continuous 100 mL/hr at 07/19/24 0200 Rate Verify at 07/19/24 0200         Fluid Balance:     Intake/Output Summary (Last 24 hours) at 7/19/2024 1303  Last data filed at 7/19/2024 1203  Gross per 24 hour   Intake 3133.64 ml   Output 1650 ml   Net 1483.64 ml         Vital Signs:   Vitals:    07/19/24 1257   BP:    Pulse:    Resp: 14   Temp:          Physical Exam      Ventilator Settings         Laboratory Studies:   No results for input(s): "PH", "PCO2", "PO2", "HCO3", "POCSATURATED", "BE" in the last 24 hours.  Recent Labs   Lab 07/19/24  0158 07/19/24  0803   WBC 7.24  --    RBC 3.84*  --    HGB 10.8* 10.4*   HCT 33.3* 32.9*     --    MCV 86.7  --    MCH 28.1  --    MCHC 32.4*  --      Recent Labs   Lab 07/19/24  0158   GLUCOSE 187*      K 3.9      CO2 23   BUN 19.0   CREATININE 0.87   CALCIUM 8.3*   MG 2.10         Microbiology Data:   Microbiology Results (last 7 days)       ** No results found for the last 168 hours. **              Imaging:   CV Ultrasound Pseudoaneurysm Evaluation/Treatment  The right  common femoral through superficial femoral arteries were patent   with no evidence of a pseudoaneurysm.   Cardiac catheterization    100% thrombotic occlusion of the RCA secondary to in stent thrombosis   due to medical noncompliance.  RCA has multiple layers of stents.  Status   post successful mechanical thrombectomy and balloon angioplasty of the   proximal to mid RCA with restoration of KAREN 3 flow in the native RCA,   superior branch of the PLB and PDA.  Inferior branch of the PLB appears to   be occluded with distal embolization of thrombus s/p gentle balloon   inflation.    50% mid left circumflex coronary artery stenosis    No obstructive disease in the LAD    Mildly elevated LVEDP    The procedure log was verified by Vinicio Nieves MD.    Date: 7/17/2024  Time: 9:42 PM    Procedure:   Selective coronary " angiography  Left heart catheterization  Mechanical thrombectomy using the penumbra device of the RCA  Percutaneous transluminal angioplasty and stenting of the proximal to mid   RCA  Moderate conscious sedation    Indication:  Inferior wall STEMI    Consent: The patient was brought to the cardiac catheterization lab. Was   instructed and explained about the risk, benefit and alternatives of the   procedure included but not limited to sudden cardiac death, myocardial   infarction, bleeding, vascular injury, renal failure, stroke, contrast   allergy, risk of conscious sedation and need for emergent bypass surgery.    the patient was agreeable to proceed.  Signed the consent form.  time-out was completed verifying correct patient, procedure, site,   positioning, and special equipment if applicable.     Access:  The patient was prepped using the usual sterile fashion.  Right common femoral artery.The right common femoral artery angiogram   showed adequate entry of the femoral artery above the bifurcation below   the inferior epigastric vessel.  was accessed with micropuncture   technique, ultrasound guidance.  An image of the ultrasound was put in the   paper chart for purpose of documentation.  Sheath size:6F     Diagnostic catheters :  5 Jordanian JL4.  Five Jordanian JR4 guide catheter.  5   Jordanian pigtail catheter.    Coronary findings:    Dominance: right   Left main:  No obstructive disease with less than 10% epicardial stenosis  Left anterior descending artery:  Diffuse 20% disease in the mid LAD.    Diagonal branch is free of any obstructive disease  Circumflex artery:  Focal 50% stenosis in the mid left circumflex coronary   artery.  Disease in the OM branches.  Right coronary artery:  100% thrombotic occlusion of the ostial RCA with   KAREN 0 flow. Previous stents noted from the proximal to mid RCA.    Hemodynamics: LV/AO no gradient                      LVEDP = 16 mmHg      Intervention:    Lesion description:  There was a 100% thrombotic lesion in the ostial right   coronary artery artery with KAREN 0 flow.  Guiding catheter JR4 was   advanced and selectively engage the RCA.  Anticoagulation was initiated   with heparin.  One bolus of intracoronary Aggrastat was given.  Lesion   wired with a run-through wire and positioned into the distal RCA.  Gentle   balloon inflation with a 2-0 balloon performed restoration of KAREN 1 flow.    Significant clot burden noted in the proximal RCA.  In stent thrombosis   due to medical noncompliance.  Mechanical aspiration thrombectomy was then   performed with a penumbra device with successful retrieval of clot.    Postdilatation of the entire stented segment was then performed with a 3.0   NC, as well as, 4.0 NC at high pressures.  Repeat angiography revealed   widely patent stents with restoration of KAREN 3 flow in the native RCA and   PDA.  The inferior branch of the PLB appears to be occluded.  Likely   distal embolization of thrombus. Gentle balloon inflation with a 2.0   balloon was performed.  KAREN 1 flow.  No further intervention performed.    Decision made not to put any further stents into the coronary artery.    Given his stroke-like symptoms patient was then taken to the   neurointerventional suite for cerebral angiography    6 [place] sheath sutured into position     Decision made to load the patient Plavix.  Given that he can not afford   Brilinta decision made not to reload with Brilinta.  Decision made not to   load with Effient given stroke-like symptoms.    Impression  100% thrombotic occlusion of the RCA secondary to in stent thrombosis due   to medical noncompliance.  RCA has multiple layers of stents.  Status post   successful mechanical thrombectomy and balloon angioplasty of the proximal   to mid RCA with restoration of KAREN 3 flow in the native RCA, superior   branch of the PLB and PDA.  Inferior branch of the PLB appears to be   occluded with distal embolization of  thrombus s/p gentle balloon   inflation.   50% mid left circumflex coronary artery stenosis  No obstructive disease in the LAD  Mildly elevated LVEDP    Plan  Plavix 75 mg p.o. daily (rationale for the use given above)  Aspirin 81 mg p.o. daily  For cerebral angiography per neurointerventionalist  IV fluid hydration  Aggrastat drip for 18 hours  Monitor right groin    Vinicio Nieves MD  Echo Saline Bubble? No; Ultrasound enhancing contrast? Yes    Left Ventricle: The left ventricle is normal in size. Mildly increased   wall thickness. There is normal systolic function. Ejection fraction by   visual approximation is 55%. Grade I diastolic dysfunction.    Right Ventricle: Normal right ventricular cavity size. Systolic   function is normal. TAPSE is 1.88 cm.    Aortic Valve: There is mild aortic valve sclerosis. There is mild   aortic regurgitation.    IVC/SVC: Normal venous pressure at 3 mmHg.          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, right  Presented with syncope, diaphoresis, CP, and left-sided weakness  -CTA H/N concerning for right Internal carotid artery occlusion   S/P - unsuccessful attempt at revascularizing right ICA due to chronic plaque  - collateral flow in right ICA from right ECA anastomoses and right     Thrombectomy delayed due to stabilizing measures             Plan:  -no new neuro changes  -H&H remained stable  -hemorrhage extending into right inguinal region into the right pelvis and right lower retroperitoneal region with thickened urinary bladder wall concern for cystitis check UA  -okay to step-down out of ICU    -                Jeaneth Palacios MD  7/19/2024  Pulmonology/Critical Care

## 2024-07-20 VITALS
HEIGHT: 67 IN | WEIGHT: 178 LBS | TEMPERATURE: 99 F | SYSTOLIC BLOOD PRESSURE: 137 MMHG | DIASTOLIC BLOOD PRESSURE: 69 MMHG | OXYGEN SATURATION: 98 % | RESPIRATION RATE: 24 BRPM | HEART RATE: 106 BPM | BODY MASS INDEX: 27.94 KG/M2

## 2024-07-20 LAB
ALBUMIN SERPL-MCNC: 3.1 G/DL (ref 3.4–4.8)
ALBUMIN/GLOB SERPL: 1.1 RATIO (ref 1.1–2)
ALP SERPL-CCNC: 78 UNIT/L (ref 40–150)
ALT SERPL-CCNC: 26 UNIT/L (ref 0–55)
ANION GAP SERPL CALC-SCNC: 7 MEQ/L
AST SERPL-CCNC: 26 UNIT/L (ref 5–34)
BACTERIA #/AREA URNS AUTO: NORMAL /HPF
BASOPHILS # BLD AUTO: 0.05 X10(3)/MCL
BASOPHILS NFR BLD AUTO: 0.7 %
BILIRUB SERPL-MCNC: 0.8 MG/DL
BILIRUB UR QL STRIP.AUTO: NEGATIVE
BUN SERPL-MCNC: 16.5 MG/DL (ref 8.4–25.7)
CALCIUM SERPL-MCNC: 8.6 MG/DL (ref 8.8–10)
CHLORIDE SERPL-SCNC: 107 MMOL/L (ref 98–107)
CLARITY UR: CLEAR
CO2 SERPL-SCNC: 24 MMOL/L (ref 23–31)
COLOR UR AUTO: NORMAL
CREAT SERPL-MCNC: 0.81 MG/DL (ref 0.73–1.18)
CREAT/UREA NIT SERPL: 20
EOSINOPHIL # BLD AUTO: 0.09 X10(3)/MCL (ref 0–0.9)
EOSINOPHIL NFR BLD AUTO: 1.3 %
ERYTHROCYTE [DISTWIDTH] IN BLOOD BY AUTOMATED COUNT: 15.6 % (ref 11.5–17)
GFR SERPLBLD CREATININE-BSD FMLA CKD-EPI: >60 ML/MIN/1.73/M2
GLOBULIN SER-MCNC: 2.8 GM/DL (ref 2.4–3.5)
GLUCOSE SERPL-MCNC: 155 MG/DL (ref 82–115)
GLUCOSE UR QL STRIP: NORMAL
HCT VFR BLD AUTO: 30.4 % (ref 42–52)
HGB BLD-MCNC: 10 G/DL (ref 14–18)
HGB UR QL STRIP: NEGATIVE
IMM GRANULOCYTES # BLD AUTO: 0.01 X10(3)/MCL (ref 0–0.04)
IMM GRANULOCYTES NFR BLD AUTO: 0.1 %
KETONES UR QL STRIP: NEGATIVE
LEUKOCYTE ESTERASE UR QL STRIP: NEGATIVE
LYMPHOCYTES # BLD AUTO: 1.22 X10(3)/MCL (ref 0.6–4.6)
LYMPHOCYTES NFR BLD AUTO: 18.1 %
MAGNESIUM SERPL-MCNC: 2 MG/DL (ref 1.6–2.6)
MCH RBC QN AUTO: 28.3 PG (ref 27–31)
MCHC RBC AUTO-ENTMCNC: 32.9 G/DL (ref 33–36)
MCV RBC AUTO: 86.1 FL (ref 80–94)
MONOCYTES # BLD AUTO: 0.68 X10(3)/MCL (ref 0.1–1.3)
MONOCYTES NFR BLD AUTO: 10.1 %
NEUTROPHILS # BLD AUTO: 4.68 X10(3)/MCL (ref 2.1–9.2)
NEUTROPHILS NFR BLD AUTO: 69.7 %
NITRITE UR QL STRIP: NEGATIVE
NRBC BLD AUTO-RTO: 0 %
OHS QRS DURATION: 90 MS
OHS QTC CALCULATION: 439 MS
PH UR STRIP: 7 [PH]
PHOSPHATE SERPL-MCNC: 2 MG/DL (ref 2.3–4.7)
PLATELET # BLD AUTO: 188 X10(3)/MCL (ref 130–400)
PMV BLD AUTO: 10.2 FL (ref 7.4–10.4)
POTASSIUM SERPL-SCNC: 3.8 MMOL/L (ref 3.5–5.1)
PROT SERPL-MCNC: 5.9 GM/DL (ref 5.8–7.6)
PROT UR QL STRIP: NEGATIVE
RBC # BLD AUTO: 3.53 X10(6)/MCL (ref 4.7–6.1)
RBC #/AREA URNS AUTO: NORMAL /HPF
SODIUM SERPL-SCNC: 138 MMOL/L (ref 136–145)
SP GR UR STRIP.AUTO: 1.01 (ref 1–1.03)
SQUAMOUS #/AREA URNS LPF: NORMAL /HPF
UROBILINOGEN UR STRIP-ACNC: NORMAL
WBC # BLD AUTO: 6.73 X10(3)/MCL (ref 4.5–11.5)
WBC #/AREA URNS AUTO: NORMAL /HPF

## 2024-07-20 PROCEDURE — 25000003 PHARM REV CODE 250: Performed by: NURSE PRACTITIONER

## 2024-07-20 PROCEDURE — 83735 ASSAY OF MAGNESIUM: CPT | Performed by: NURSE PRACTITIONER

## 2024-07-20 PROCEDURE — 84100 ASSAY OF PHOSPHORUS: CPT

## 2024-07-20 PROCEDURE — 85025 COMPLETE CBC W/AUTO DIFF WBC: CPT | Performed by: NURSE PRACTITIONER

## 2024-07-20 PROCEDURE — 81001 URINALYSIS AUTO W/SCOPE: CPT | Performed by: INTERNAL MEDICINE

## 2024-07-20 PROCEDURE — 93010 ELECTROCARDIOGRAM REPORT: CPT | Mod: ,,, | Performed by: STUDENT IN AN ORGANIZED HEALTH CARE EDUCATION/TRAINING PROGRAM

## 2024-07-20 PROCEDURE — 25000003 PHARM REV CODE 250: Performed by: INTERNAL MEDICINE

## 2024-07-20 PROCEDURE — 63600175 PHARM REV CODE 636 W HCPCS

## 2024-07-20 PROCEDURE — 36415 COLL VENOUS BLD VENIPUNCTURE: CPT | Performed by: NURSE PRACTITIONER

## 2024-07-20 PROCEDURE — 80053 COMPREHEN METABOLIC PANEL: CPT | Performed by: NURSE PRACTITIONER

## 2024-07-20 PROCEDURE — 93005 ELECTROCARDIOGRAM TRACING: CPT

## 2024-07-20 RX ORDER — CARVEDILOL 6.25 MG/1
6.25 TABLET ORAL 2 TIMES DAILY
Qty: 180 TABLET | Refills: 0 | Status: SHIPPED | OUTPATIENT
Start: 2024-07-20

## 2024-07-20 RX ORDER — TAMSULOSIN HYDROCHLORIDE 0.4 MG/1
0.4 CAPSULE ORAL DAILY
Qty: 30 CAPSULE | Refills: 1 | Status: SHIPPED | OUTPATIENT
Start: 2024-07-21

## 2024-07-20 RX ORDER — CLOPIDOGREL BISULFATE 75 MG/1
75 TABLET ORAL DAILY
Qty: 30 TABLET | Refills: 2 | Status: SHIPPED | OUTPATIENT
Start: 2024-07-21

## 2024-07-20 RX ADMIN — PANTOPRAZOLE SODIUM 40 MG: 40 INJECTION, POWDER, LYOPHILIZED, FOR SOLUTION INTRAVENOUS at 08:07

## 2024-07-20 RX ADMIN — TAMSULOSIN HYDROCHLORIDE 0.4 MG: 0.4 CAPSULE ORAL at 08:07

## 2024-07-20 RX ADMIN — CARVEDILOL 6.25 MG: 3.12 TABLET, FILM COATED ORAL at 08:07

## 2024-07-20 RX ADMIN — CLOPIDOGREL BISULFATE 75 MG: 75 TABLET ORAL at 08:07

## 2024-07-20 RX ADMIN — MUPIROCIN: 20 OINTMENT TOPICAL at 08:07

## 2024-07-20 RX ADMIN — ASPIRIN 81 MG: 81 TABLET, COATED ORAL at 08:07

## 2024-07-20 NOTE — NURSING
Pt and wife educated on discharging instructions. Stressed importance of taking ASA and plavix. Pt and wife both verbalized understanding and all q's answered.    MD recommended pt d/c with home health. Both pt and his wife declined these services.

## 2024-07-20 NOTE — PROGRESS NOTES
"[facility]    Cardiology  Progress Note    Patient Name: Jb Abbott  MRN: [id number]  Admission Date: 7/17/2024  Hospital Length of Stay: 3 days  Code Status: Full Code   Attending Physician: Suleman Monteiro MD   Primary Care Physician: Greer, Primary Doctor  Expected Discharge Date:   Principal Problem:ST elevation myocardial infarction involving right coronary artery    Subjective:     Brief HPI/Hospital Course: Mr. Abbott is a 65 y/o male who is known to [facility], Dr. Cole. The patient has a prior history of RCA stent with recent NSTEMI underwent coronary angiogram 06/30/2024 and found to have 100% occluded RCA.  Subsequently angioplasty and treated with 2 overlapping drug-eluting stent.  Patient left AMA.  Tells me he lives in [place].  Could not afford any medications.  Did not take any aspirin or Brilinta following discharge from the hospital. He was admitted with chest pain and diaphoresis. An initial EKG reveals an inferior wall STEMI.     7.18.24: NAD. S/P LHC with PCI to RCA. S/P Cerebral Angiogram. "I am ok." PT reports, "I left this place. I didn't get no medicine."   7.19.24: NAD. Sitting in Bedside Chair. Denies CP, SOB and Palps. Confused Conversation, Phone upside down looking at it." VSS.   7.20.24: NAD. Sitting in Bedside Chair. Denies CP, SOB and Palps. "I am ok."     PMH: DM II, NSTEMI, HTN, Tobacco Use, CAD/Stents (RCA), GERD, HLD  PSH: Angiogram, Appendectomy  Family History: Father, L, HTN, CAD, DM II; Mother, L, HTN, CVA, DM II  Social History: Denies Illicit Drug and ETOH Use; + Tobacco Use Daily 1 ppd     Previous Diagnostics:  ECHO 7.18.24:  Left Ventricle: The left ventricle is normal in size. Mildly increased wall thickness. There is normal systolic function. Ejection fraction by visual approximation is 55%. Grade I diastolic dysfunction.  Right Ventricle: Normal right ventricular cavity size. Systolic function is normal. TAPSE is 1.88 cm.  Aortic Valve: There is mild " aortic valve sclerosis. There is mild aortic regurgitation.  IVC/SVC: Normal venous pressure at 3 mmHg.    Cerebral Angiogram 7.17.24:  OVERALL IMPRESSION:  Concern for right femoral artery dissection resulting in contrast extravasation.  Hemostasis obtained with manual compression with no evidence of contrast extravasation from the contralateral injection.  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  Unsuccessful attempt at access in the right internal carotid artery.    Collateral flow in the right hemisphere via right external carotid artery anastomosis and right posterior communicating artery.  No significant flow noted across the anterior communicating artery segment.  Concern for Nonocclusive emboli in the right anterior cerebral artery.    RLEA US R/O Pseudo 7.17.24:  The right  common femoral through superficial femoral arteries were patent with no evidence of a pseudoaneurysm.     LHC 7.17.24:  100% thrombotic occlusion of the RCA secondary to in stent thrombosis due to medical noncompliance.  RCA has multiple layers of stents.  Status post successful mechanical thrombectomy and balloon angioplasty of the proximal to mid RCA with restoration of KAREN 3 flow in the native RCA, superior branch of the PLB and PDA.  Inferior branch of the PLB appears to be occluded with distal embolization of thrombus s/p gentle balloon inflation.  50% mid left circumflex coronary artery stenosis  No obstructive disease in the LAD  Mildly elevated LVEDP  Coronary Findings:  Dominance: right   Left main:  No obstructive disease with less than 10% epicardial stenosis  Left anterior descending artery:  Diffuse 20% disease in the mid LAD.  Diagonal branch is free of any obstructive disease  Circumflex artery:  Focal 50% stenosis in the mid left circumflex coronary artery.  Disease in the OM branches.  Right coronary artery:  100% thrombotic occlusion of the ostial RCA with KAREN 0 flow.  Previous stents noted from the proximal to mid RCA.  Impression  100% thrombotic occlusion of the RCA secondary to in stent thrombosis due to medical noncompliance.  RCA has multiple layers of stents.  Status post successful mechanical thrombectomy and balloon angioplasty of the proximal to mid RCA with restoration of KAREN 3 flow in the native RCA, superior branch of the PLB and PDA.  Inferior branch of the PLB appears to be occluded with distal embolization of thrombus s/p gentle balloon inflation.   50% mid left circumflex coronary artery stenosis  No obstructive disease in the LAD  Mildly elevated LVEDP  Plan  Plavix 75 mg p.o. daily (rationale for the use given above)  Aspirin 81 mg p.o. daily  For cerebral angiography per neurointerventionalist  IV fluid hydration  Aggrastat drip for 18 hours  Monitor right groin    ECHO 7.4.24:  Left Ventricle: There is normal systolic function.     ECHO 6.30.24:  Left Ventricle: The left ventricle is normal in size. Normal wall thickness. Normal wall motion. The basal inferior wall has brighter scaring and slightly aneurysmal, appears consistent with old MI. There is normal systolic function with a visually estimated ejection fraction of 55 - 60%. Grade I diastolic dysfunction.  Right Ventricle: Normal right ventricular cavity size. Systolic function is normal.  Aortic Valve: The aortic valve is a trileaflet valve.  Mitral Valve: The mitral valve is structurally normal. There is mild regurgitation.    [facility] 6.30.24:  Findings:  - There is severe coronary artery disease.  - Mid Left Circumflex has a 50% stenosis.  - Prox Right Coronary Artery had a 50-60% stenosis. Mid Right Coronary Artery was 100% occluded. The mid RCA lesion was thrombotic, and the culprit lesion. There were faint left-to-right collaterals from the septal perforating branches of the LAD. This was the culprit lesion. The lesions were successfully treated with overlapping SYNERGY XD 4.0x38MM and 3.5x32MM  drug-eluting stents. Post-dilatation was performed using an NC EUPHORA 4.0x27MM angioplasty balloon inflated to nominal pressure. Following intervention there was 0% residual stenosis and KAREN grade 3 flow in the distal vessel.  - Intravascular Ultrasound (IVUS) was performed prior to intervention to further characterize the lesion and measure the vessel for PCI.  - LVEDP is 25 mmHg.  Assessment/Plan:  - Patient is a 64 y.o. male with a history of CAD (prior DAVID to RCA), DM2, HTN, now presents with type I NSTEMI. Found to have culprit lesion in RCA which was successfully treated as noted above.  - Patient was given a loading dose of ticagrelor 180 mg PO in the cath lab  - Continue DAPT (aspirin + ticagrelor) for a minimum of 12 months and aspirin indefinitely thereafter  - Intolerant of statins. Patient needs to be on PCSK9 inhibitor in outpatient setting.  - Continue Aggrastat for 6 hours post-procedure  - Radial band weaning per protocol    Review of Systems   Constitutional: Negative for malaise/fatigue.   Cardiovascular:  Negative for chest pain, claudication and leg swelling.   Respiratory:  Negative for shortness of breath.    Neurological:  Positive for weakness.   All other systems reviewed and are negative.    Objective:     Vital Signs (Most Recent):  Temp: 98.6 °F (37 °C) (07/20/24 0800)  Pulse: 95 (07/20/24 0800)  Resp: 16 (07/20/24 0800)  BP: 117/64 (07/20/24 0800)  SpO2: 97 % (07/20/24 0800) Vital Signs (24h Range):  Temp:  [98.2 °F (36.8 °C)-98.6 °F (37 °C)] 98.6 °F (37 °C)  Pulse:  [] 95  Resp:  [14-27] 16  SpO2:  [95 %-99 %] 97 %  BP: (117-182)/(64-87) 117/64   Weight: 80.7 kg (178 lb)  Body mass index is 27.88 kg/m².  SpO2: 97 %       Intake/Output Summary (Last 24 hours) at 7/20/2024 1232  Last data filed at 7/20/2024 0633  Gross per 24 hour   Intake --   Output 600 ml   Net -600 ml     Lines/Drains/Airways       Peripheral Intravenous Line  Duration                  Peripheral IV - Single  Lumen 18 G Anterior;Left;Proximal Forearm -- days         Peripheral IV - Single Lumen 07/17/24 2005 18 G Anterior;Proximal;Right Forearm 2 days                  Significant Labs:   Chemistries:   Recent Labs   Lab 07/17/24 2023 07/18/24  0506 07/19/24  0158 07/20/24  0240    136 137 138   K 4.4 4.6 3.9 3.8    104 107 107   CO2 23 21* 23 24   BUN 25.6 24.5 19.0 16.5   CREATININE 1.28* 1.10 0.87 0.81   CALCIUM 9.6 8.4* 8.3* 8.6*   BILITOT 0.4 0.3 0.6 0.8   ALKPHOS 93 86 81 78   ALT 33 35 31 26   AST 15 52* 44* 26   GLUCOSE 322* 347* 187* 155*   MG  --  2.00 2.10 2.00   PHOS  --  4.4 2.2* 2.0*   TROPONINI <0.010  --   --   --         CBC/Anemia Labs: Coags:    Recent Labs   Lab 07/18/24  0507 07/18/24  0824 07/19/24  0158 07/19/24  0803 07/20/24  0240   WBC 10.73  --  7.24  --  6.73   HGB 12.1*   < > 10.8* 10.4* 10.0*   HCT 37.2*   < > 33.3* 32.9* 30.4*     --  211  --  188   MCV 85.7  --  86.7  --  86.1   RDW 15.7  --  15.9  --  15.6    < > = values in this interval not displayed.    Recent Labs   Lab 07/17/24 2023   INR 1.0   APTT 24.4        Telemetry: SR    Physical Exam  Constitutional:       General: He is not in acute distress.     Appearance: Normal appearance. He is obese.   HENT:      Head: Normocephalic.      Mouth/Throat:      Mouth: Mucous membranes are moist.   Eyes:      Conjunctiva/sclera: Conjunctivae normal.   Cardiovascular:      Rate and Rhythm: Normal rate and regular rhythm.      Pulses: Normal pulses.      Heart sounds: Murmur heard.   Pulmonary:      Effort: Respiratory distress present.      Breath sounds: Normal breath sounds.      Comments: NC O2  Abdominal:      Palpations: Abdomen is soft.   Skin:     General: Skin is warm and dry.   Neurological:      Mental Status: He is alert and oriented to person, place, and time.      Comments: L Sided Facial Droop Improving, LUE Weaker than RUE; R Groin Soft/Flat, Noted Ecchymosis, Mild Tenderness, No Sign of Bleed/Infection. +2  BLE Palpable Pedal Pulses    Psychiatric:         Mood and Affect: Mood normal.         Behavior: Behavior normal.       Current Schedule Inpatient Medications:   aspirin  81 mg Oral Daily    carvediloL  6.25 mg Oral BID    clopidogreL  75 mg Oral Daily    mupirocin   Nasal BID    pantoprazole  40 mg Intravenous Daily    tamsulosin  0.4 mg Oral Daily     Continuous Infusions:   0.9% NaCl   Intravenous Continuous 100 mL/hr at 07/19/24 0200 Rate Verify at 07/19/24 0200     Assessment:   STEMI (Non-Anterior Wall MI)    - [facility] (7.17.24) - 100% thrombotic occlusion of the RCA secondary to in stent thrombosis due to medical noncompliance. RCA has multiple layers of stents. Status post successful mechanical thrombectomy and balloon angioplasty of the proximal to mid RCA with restoration of KAREN 3 flow in the native RCA, superior branch of the PLB and PDA. Inferior branch of the PLB appears to be occluded with distal embolization of thrombus s/p gentle balloon inflation. 50% mid left circumflex coronary artery stenosis. No obstructive disease in the LAD.    - Inferior Wall MI (Initial EKG)  CAD/Stents    - ECHO (7.18.24) - LVEF 55%  R ICA Occlusion s/p Cerebral Angiogram with Possible Dissection  R CFA Sheath Extravasation with Vascular Surgery Backup - Resolved    - CT Abd/Pelvis (7.18.24) - Findings seen consistent with hemorrhage extending from the right inguinal region into the right hemipelvis and right lower retroperitoneal region. Urinary bladder wall is thickened and cystitis should be excluded. Small amount of perinephric fluid seen around the left kidney which is a new finding. Hepatic steatosis.  HTN - Controlled  HLD  DM II  Tobacco Use  GERD  Medical Therapy Non-Compliance  Statin Allergy  No Hx of GIB     Plan:   ECHO Reviewed  Continue ASA, BB and Plavix  No Statin 2/2 Allergy  Start Coreg 6.25mg PO BID   Add GDMT as BP/HR Allows  OK to D/C Home Today   F/U with [facility] in 1 week - Dr. Cole  We will be available as  needed    Carlitos Stewart, ANP  Cardiology  [facility]    I personally reviewed the NP history and physical above.  See below MDM component performed by me (Tunde Cole MD):    STEMI due to non-compliance with his meds    He is going to be given meds prior to leaving, compliance stressed with pt and his wife  Drug abuse  CVA  CAD/PCI  HTN  HLP  DM  Smoker

## 2024-07-21 NOTE — PLAN OF CARE
Patient did not fill meds due t ocost. Not sure what exact cost of meds is. Did not fill at retail pharmacy. Called [facility] pharmacy they are closed.

## 2024-07-21 NOTE — PLAN OF CARE
Spoke to [facility] pharmacist the prices they gave patient were cash prices did not have patient insurance info. Prices went down to zero dollars

## 2024-07-23 ENCOUNTER — PATIENT OUTREACH (OUTPATIENT)
Dept: ADMINISTRATIVE | Facility: CLINIC | Age: 64
End: 2024-07-23
Payer: MEDICARE

## 2024-07-23 NOTE — PROGRESS NOTES
C3 nurse attempted to contact Jb Abbott  for a TCC post hospital discharge follow up call. No answer. The patient does not have a scheduled HOSFU appointment, and the pt does not have an [facility] PCP.

## 2024-07-23 NOTE — PROGRESS NOTES
C3 nurse attempted to contact Jb Abbott  for a TCC post hospital discharge follow up call. No answer, Left message.The patient does not have a scheduled HOSFU appointment, and the pt does not have an [facility] PCP.

## 2024-08-28 ENCOUNTER — HOSPITAL ENCOUNTER (OUTPATIENT)
Dept: RADIOLOGY | Facility: HOSPITAL | Age: 64
Discharge: HOME | End: 2024-08-28
Attending: NURSE PRACTITIONER
Payer: MEDICARE

## 2024-08-28 DIAGNOSIS — G89.4 CHRONIC PAIN DISORDER: ICD-10-CM

## 2024-08-28 PROCEDURE — 73090 X-RAY EXAM OF FOREARM: CPT | Mod: TC,RT

## 2024-08-28 PROCEDURE — 73502 X-RAY EXAM HIP UNI 2-3 VIEWS: CPT | Mod: TC,LT

## 2024-08-28 PROCEDURE — 73090 X-RAY EXAM OF FOREARM: CPT | Mod: TC,LT

## 2024-08-28 PROCEDURE — 73552 X-RAY EXAM OF FEMUR 2/>: CPT | Mod: TC,LT

## 2024-08-29 ENCOUNTER — HOSPITAL ENCOUNTER (OUTPATIENT)
Dept: RADIOLOGY | Facility: HOSPITAL | Age: 64
Discharge: HOME | End: 2024-08-29
Attending: NURSE PRACTITIONER
Payer: MEDICARE

## 2024-08-29 DIAGNOSIS — G89.4 CHRONIC PAIN DISORDER: ICD-10-CM

## 2024-08-29 PROCEDURE — 73030 X-RAY EXAM OF SHOULDER: CPT | Mod: TC,RT

## 2024-08-29 PROCEDURE — 73060 X-RAY EXAM OF HUMERUS: CPT | Mod: TC,RT

## 2024-09-19 ENCOUNTER — HOSPITAL ENCOUNTER (OUTPATIENT)
Dept: RADIOLOGY | Facility: HOSPITAL | Age: 64
Discharge: HOME | End: 2024-09-19
Attending: PODIATRIST
Payer: MEDICARE

## 2024-09-19 DIAGNOSIS — S97.82XS CRUSHING INJURY OF LEFT FOOT, SEQUELA: ICD-10-CM

## 2024-09-19 PROCEDURE — 73630 X-RAY EXAM OF FOOT: CPT | Mod: TC,LT
